# Patient Record
Sex: FEMALE | Race: WHITE | NOT HISPANIC OR LATINO | Employment: PART TIME | URBAN - METROPOLITAN AREA
[De-identification: names, ages, dates, MRNs, and addresses within clinical notes are randomized per-mention and may not be internally consistent; named-entity substitution may affect disease eponyms.]

---

## 2020-06-21 ENCOUNTER — CLINICAL SUPPORT (OUTPATIENT)
Dept: URGENT CARE | Facility: CLINIC | Age: 30
End: 2020-06-21
Payer: OTHER GOVERNMENT

## 2020-06-21 VITALS
HEART RATE: 87 BPM | DIASTOLIC BLOOD PRESSURE: 75 MMHG | OXYGEN SATURATION: 98 % | WEIGHT: 145 LBS | HEIGHT: 70 IN | TEMPERATURE: 97 F | BODY MASS INDEX: 20.76 KG/M2 | RESPIRATION RATE: 19 BRPM | SYSTOLIC BLOOD PRESSURE: 113 MMHG

## 2020-06-21 DIAGNOSIS — E10.9 CONTROLLED DIABETES MELLITUS TYPE 1 WITHOUT COMPLICATIONS: ICD-10-CM

## 2020-06-21 DIAGNOSIS — Z76.0 MEDICATION REFILL: Primary | ICD-10-CM

## 2020-06-21 DIAGNOSIS — R73.9 HYPERGLYCEMIA: ICD-10-CM

## 2020-06-21 LAB — GLUCOSE SERPL-MCNC: 417 MG/DL (ref 70–110)

## 2020-06-21 PROCEDURE — 99204 OFFICE O/P NEW MOD 45 MIN: CPT | Mod: S$GLB,,, | Performed by: NURSE PRACTITIONER

## 2020-06-21 PROCEDURE — 99204 PR OFFICE/OUTPT VISIT, NEW, LEVL IV, 45-59 MIN: ICD-10-PCS | Mod: S$GLB,,, | Performed by: NURSE PRACTITIONER

## 2020-06-21 PROCEDURE — 82962 GLUCOSE BLOOD TEST: CPT | Mod: ,,, | Performed by: NURSE PRACTITIONER

## 2020-06-21 PROCEDURE — 82962 POCT GLUCOSE, HAND-HELD DEVICE: ICD-10-PCS | Mod: ,,, | Performed by: NURSE PRACTITIONER

## 2020-06-21 RX ORDER — INSULIN ASPART 100 [IU]/ML
1 INJECTION, SOLUTION INTRAVENOUS; SUBCUTANEOUS
Qty: 6 VIAL | Refills: 0 | Status: SHIPPED | OUTPATIENT
Start: 2020-06-21 | End: 2020-09-25

## 2020-06-21 NOTE — PATIENT INSTRUCTIONS
Step-by-Step  Checking Your Blood Sugar    Date Last Reviewed: 5/1/2016  © 2350-8836 [employer], [employer]. [address] [place] [address], [place] [address]. All rights reserved. This information is not intended as a substitute for professional medical care. Always follow your healthcare professional's instructions.

## 2020-06-21 NOTE — PROGRESS NOTES
"Subjective:       Patient ID: Adore Garcia is a 29 y.o. female.    Vitals:  height is 5' 10" (1.778 m) and weight is 65.8 kg (145 lb). Her temperature is 97.1 °F (36.2 °C). Her blood pressure is 113/75 and her pulse is 87. Her respiration is 19 and oxygen saturation is 98%.     Chief Complaint: Medication Refill    Needs refill on novolog insulin, tried contacting doctor for refill no response     Medication Refill  Pertinent negatives include no fever.       Constitution: Negative for fever.   Endocrine: excessive thirst and excessive urination.   Genitourinary: Negative for dysuria, frequency, urgency and urine decreased.       Objective:      Physical Exam   HENT:   Head: Normocephalic.   Eyes: Conjunctivae are normal.   Neck: Normal range of motion.   Cardiovascular: Normal rate.   Pulmonary/Chest: Effort normal.   Abdominal: Normal appearance.   Neurological: She is alert.   Psychiatric: Mood normal.   Nursing note and vitals reviewed.        Assessment:       1. Medication refill    2. Controlled diabetes mellitus type 1 without complications    3. Hyperglycemia        Plan:         Medication refill  -     POCT Glucose, Hand-Held Device    Controlled diabetes mellitus type 1 without complications  -     POCT Glucose, Hand-Held Device    Hyperglycemia  -     POCT Glucose, Hand-Held Device    Other orders  -     insulin aspart U-100 (NOVOLOG) 100 unit/mL injection; Inject 1 Units into the skin as needed for High Blood Sugar.  Dispense: 6 vial; Refill: 0    Pt is a type 1 diabetic presenting as she is unable to get in touch with her endocrinologist for several weeks to refill insulin for her pump. Pt CBG 417mg/dL but is asymptomatic and ran out of insulin this a.m. She was provided 1 month supply to avoid any abrupt stop and advised to go to ER for any further issues.        "

## 2020-09-25 ENCOUNTER — OFFICE VISIT (OUTPATIENT)
Dept: ENDOCRINOLOGY | Facility: CLINIC | Age: 30
End: 2020-09-25
Payer: MEDICAID

## 2020-09-25 VITALS
HEART RATE: 98 BPM | TEMPERATURE: 97 F | DIASTOLIC BLOOD PRESSURE: 70 MMHG | SYSTOLIC BLOOD PRESSURE: 104 MMHG | BODY MASS INDEX: 20.94 KG/M2 | HEIGHT: 70 IN | WEIGHT: 146.25 LBS

## 2020-09-25 DIAGNOSIS — E10.65 TYPE 1 DIABETES MELLITUS WITH HYPERGLYCEMIA: Primary | ICD-10-CM

## 2020-09-25 PROCEDURE — 99999 PR PBB SHADOW E&M-EST. PATIENT-LVL IV: CPT | Mod: PBBFAC,,, | Performed by: PHYSICIAN ASSISTANT

## 2020-09-25 PROCEDURE — 99203 OFFICE O/P NEW LOW 30 MIN: CPT | Mod: S$PBB,,, | Performed by: PHYSICIAN ASSISTANT

## 2020-09-25 PROCEDURE — 99999 PR PBB SHADOW E&M-EST. PATIENT-LVL IV: ICD-10-PCS | Mod: PBBFAC,,, | Performed by: PHYSICIAN ASSISTANT

## 2020-09-25 PROCEDURE — 99203 PR OFFICE/OUTPT VISIT, NEW, LEVL III, 30-44 MIN: ICD-10-PCS | Mod: S$PBB,,, | Performed by: PHYSICIAN ASSISTANT

## 2020-09-25 PROCEDURE — 99214 OFFICE O/P EST MOD 30 MIN: CPT | Mod: PBBFAC,PO | Performed by: PHYSICIAN ASSISTANT

## 2020-09-25 RX ORDER — INSULIN ASPART 100 [IU]/ML
INJECTION, SOLUTION INTRAVENOUS; SUBCUTANEOUS
Qty: 90 ML | Refills: 3 | Status: SHIPPED | OUTPATIENT
Start: 2020-09-25 | End: 2020-09-29

## 2020-09-25 NOTE — PROGRESS NOTES
CC: This 29 y.o. female presents for management of Diabetes (1)    and chronic conditions pending review including HTN, HLP    HPI: was diagnosed with T1DM in 1996 after experiencing polyuria and polydipsia.   Has been hospitalized for DKA multiple times. New to endocrine. She did have seizures in the past after having hypoglycemia. She has been on the pump since she was 15.   Family hx of DM: half sister  Fhx of thyroid disease: parents  Denies missing doses of DM medication.   hypoglycemia at home  monitoring BG at home:  Fastin-200  DN: 200    Diet:   BF-2 sausage biscuits  LH-pb sandwich  DN-fried seafood.   No snacks. Avoids sugary beverages.     Exercise: She runs, walks and does yoga at home 5x weeky.     Name of Device or Devices used by the patient: MiniMed 530 G  When did you start the current therapy you are on: 4 years ago   Frequency of changing site/infusion set/tubing/cartridges: 6 days  Frequency of changing CGM: n/a  Using bolus wizard: no  Taking bolus with each meal:  no    Basal  MN: 1.40 u/hr  2 am: 1.65 u/hr  10 am: 1.90 u/hr  16:30: 1.75 u/hr    CHO: 6  ISF  MN: 50  7:00: 20  21:00: 25    Target: 100    Standards of Care:  Eye exam: 3/20  Podiatry exam: n/a  DE:  A few years ago     PMHx, PSHx: reviewed in epic.  Social Hx: no E/T use. Works at [employer] as a . Currently, going through a divorce. Has one son ~5 yrs old.    ROS:   Gen: Appetite good, no weight gain or loss, denies fatigue and weakness.  Skin: Skin is intact and heals well, no rashes, no hair changes  Eyes: Denies visual disturbances  Resp: no SOB or CHAN, no cough  Cardiac: No palpitations, chest pain, no edema   GI: No nausea or vomiting, diarrhea, constipation, or abdominal pain.  /GYN: No nocturia, burning or pain.   MS/Neuro: Denies numbness/ tingling in BLE; Gait steady, speech clear  Psych: Denies drug/ETOH abuse, no hx of depression.  Other systems: negative.    /70 (BP Location: Right arm, Patient  "Position: Sitting, BP Method: Small (Manual))   Pulse 98   Temp 97.3 °F (36.3 °C) (Temporal)   Ht 5' 10" (1.778 m)   Wt 66.3 kg (146 lb 4.4 oz)   BMI 20.99 kg/m²      PE:  GENERAL: young female, well developed, well nourished.  PSYCH: AAOx3, appropriate mood and affect, pleasant expression, conversant, appears relaxed, well groomed.   EYES: Conjunctiva, corneas clear  NECK: Supple, trachea midline,no thyromegaly or nodules  CHEST: Resp even and unlabored, CTA bilateral.  CARDIAC: RRR, S1, S2 heard, no murmurs  ABDOMEN: Soft, non-tender, non-distended.   VASCULAR: DP pulses +2/4 bilaterally, no edema.  NEURO: Gait steady, CN ll-Xll grossly intact   SKIN: Skin warm and dry no acanthosis nigracans.  Foot Exam: no sores or macerations noted.     Protective Sensation (w/ 10 gram monofilament):  Right: Intact  Left: Intact    Visual Inspection:  Normal -  Bilateral and Nails Intact - without Evidence of Foot Deformity- Bilateral    Pedal Pulses:   Right: Present  Left: Present    Posterior tibialis:   Right:Present  Left: Present     Vibratory Sensation  Right:Positive  Left:Positive     Personally reviewed labs below:    No visits with results within 1 Month(s) from this visit.   Latest known visit with results is:   Clinical Support on 06/21/2020   Component Date Value Ref Range Status    POC Glucose 06/21/2020 417* 70 - 110 MG/DL Final         ASSESSMENT and PLAN:    1. Type 1 diabetes mellitus with hyperglycemia  Ambulatory referral/consult to Diabetes Education    Hemoglobin A1C    Comprehensive metabolic panel    CBC auto differential    T4, free    TSH    Lipid Panel    Renal function panel    Hemoglobin A1C    insulin lispro 100 unit/mL injection     T2DM with hyperglycemia-A1c today. Pt not putting glucose in pump. Advised to start using in bolus wizard, change tubing every 3 days. Referral to DE. Will obtain previous records.  Discussed DM, progression of disease, long term complications, tx options. "   Discussed A1c and BG goals.   Reviewed  hypoglycemia, s/s and appropriate tx.   Instructed to monitor BG and bring meter/ log to every clinic visit.   - takes ASA, ACEi, statin       Follow-up: in 3 months with lab prior

## 2020-09-29 RX ORDER — INSULIN LISPRO 100 [IU]/ML
INJECTION, SOLUTION INTRAVENOUS; SUBCUTANEOUS
Qty: 90 ML | Refills: 3 | Status: SHIPPED | OUTPATIENT
Start: 2020-09-29 | End: 2021-02-24

## 2020-10-10 ENCOUNTER — LAB VISIT (OUTPATIENT)
Dept: LAB | Facility: HOSPITAL | Age: 30
End: 2020-10-10
Attending: PHYSICIAN ASSISTANT
Payer: MEDICAID

## 2020-10-10 DIAGNOSIS — E10.65 TYPE 1 DIABETES MELLITUS WITH HYPERGLYCEMIA: ICD-10-CM

## 2020-10-10 LAB
ALBUMIN SERPL BCP-MCNC: 3.9 G/DL (ref 3.5–5.2)
ALP SERPL-CCNC: 61 U/L (ref 55–135)
ALT SERPL W/O P-5'-P-CCNC: 10 U/L (ref 10–44)
ANION GAP SERPL CALC-SCNC: 10 MMOL/L (ref 8–16)
AST SERPL-CCNC: 12 U/L (ref 10–40)
BASOPHILS # BLD AUTO: 0.02 K/UL (ref 0–0.2)
BASOPHILS NFR BLD: 0.3 % (ref 0–1.9)
BILIRUB SERPL-MCNC: 0.7 MG/DL (ref 0.1–1)
BUN SERPL-MCNC: 11 MG/DL (ref 6–20)
CALCIUM SERPL-MCNC: 9.3 MG/DL (ref 8.7–10.5)
CHLORIDE SERPL-SCNC: 102 MMOL/L (ref 95–110)
CHOLEST SERPL-MCNC: 189 MG/DL (ref 120–199)
CHOLEST/HDLC SERPL: 2.1 {RATIO} (ref 2–5)
CO2 SERPL-SCNC: 26 MMOL/L (ref 23–29)
CREAT SERPL-MCNC: 0.7 MG/DL (ref 0.5–1.4)
DIFFERENTIAL METHOD: ABNORMAL
EOSINOPHIL # BLD AUTO: 0.1 K/UL (ref 0–0.5)
EOSINOPHIL NFR BLD: 2.4 % (ref 0–8)
ERYTHROCYTE [DISTWIDTH] IN BLOOD BY AUTOMATED COUNT: 12 % (ref 11.5–14.5)
EST. GFR  (AFRICAN AMERICAN): >60 ML/MIN/1.73 M^2
EST. GFR  (NON AFRICAN AMERICAN): >60 ML/MIN/1.73 M^2
ESTIMATED AVG GLUCOSE: 329 MG/DL (ref 68–131)
GLUCOSE SERPL-MCNC: 152 MG/DL (ref 70–110)
HBA1C MFR BLD HPLC: 13.1 % (ref 4–5.6)
HCT VFR BLD AUTO: 45.3 % (ref 37–48.5)
HDLC SERPL-MCNC: 90 MG/DL (ref 40–75)
HDLC SERPL: 47.6 % (ref 20–50)
HGB BLD-MCNC: 14.4 G/DL (ref 12–16)
IMM GRANULOCYTES # BLD AUTO: 0.01 K/UL (ref 0–0.04)
IMM GRANULOCYTES NFR BLD AUTO: 0.2 % (ref 0–0.5)
LDLC SERPL CALC-MCNC: 89.4 MG/DL (ref 63–159)
LYMPHOCYTES # BLD AUTO: 1.7 K/UL (ref 1–4.8)
LYMPHOCYTES NFR BLD: 29.1 % (ref 18–48)
MCH RBC QN AUTO: 28.2 PG (ref 27–31)
MCHC RBC AUTO-ENTMCNC: 31.8 G/DL (ref 32–36)
MCV RBC AUTO: 89 FL (ref 82–98)
MONOCYTES # BLD AUTO: 0.4 K/UL (ref 0.3–1)
MONOCYTES NFR BLD: 6.9 % (ref 4–15)
NEUTROPHILS # BLD AUTO: 3.5 K/UL (ref 1.8–7.7)
NEUTROPHILS NFR BLD: 61.1 % (ref 38–73)
NONHDLC SERPL-MCNC: 99 MG/DL
NRBC BLD-RTO: 0 /100 WBC
PLATELET # BLD AUTO: 273 K/UL (ref 150–350)
PMV BLD AUTO: 10.3 FL (ref 9.2–12.9)
POTASSIUM SERPL-SCNC: 4.4 MMOL/L (ref 3.5–5.1)
PROT SERPL-MCNC: 6.9 G/DL (ref 6–8.4)
RBC # BLD AUTO: 5.1 M/UL (ref 4–5.4)
SODIUM SERPL-SCNC: 138 MMOL/L (ref 136–145)
T4 FREE SERPL-MCNC: 1.04 NG/DL (ref 0.71–1.51)
TRIGL SERPL-MCNC: 48 MG/DL (ref 30–150)
TSH SERPL DL<=0.005 MIU/L-ACNC: 0.84 UIU/ML (ref 0.4–4)
WBC # BLD AUTO: 5.8 K/UL (ref 3.9–12.7)

## 2020-10-10 PROCEDURE — 80061 LIPID PANEL: CPT

## 2020-10-10 PROCEDURE — 84439 ASSAY OF FREE THYROXINE: CPT

## 2020-10-10 PROCEDURE — 83036 HEMOGLOBIN GLYCOSYLATED A1C: CPT

## 2020-10-10 PROCEDURE — 85025 COMPLETE CBC W/AUTO DIFF WBC: CPT

## 2020-10-10 PROCEDURE — 84443 ASSAY THYROID STIM HORMONE: CPT

## 2020-10-10 PROCEDURE — 80053 COMPREHEN METABOLIC PANEL: CPT

## 2020-10-10 PROCEDURE — 36415 COLL VENOUS BLD VENIPUNCTURE: CPT | Mod: PO

## 2020-10-14 ENCOUNTER — CLINICAL SUPPORT (OUTPATIENT)
Dept: DIABETES | Facility: CLINIC | Age: 30
End: 2020-10-14
Payer: MEDICAID

## 2020-10-14 DIAGNOSIS — E10.65 TYPE 1 DIABETES MELLITUS WITH HYPERGLYCEMIA: ICD-10-CM

## 2020-10-14 PROCEDURE — 99212 OFFICE O/P EST SF 10 MIN: CPT | Mod: PBBFAC,PO | Performed by: DIETITIAN, REGISTERED

## 2020-10-14 PROCEDURE — 99999 PR PBB SHADOW E&M-EST. PATIENT-LVL II: ICD-10-PCS | Mod: PBBFAC,,, | Performed by: DIETITIAN, REGISTERED

## 2020-10-14 PROCEDURE — G0108 DIAB MANAGE TRN  PER INDIV: HCPCS | Mod: PBBFAC,PO | Performed by: DIETITIAN, REGISTERED

## 2020-10-14 PROCEDURE — 99999 PR PBB SHADOW E&M-EST. PATIENT-LVL II: CPT | Mod: PBBFAC,,, | Performed by: DIETITIAN, REGISTERED

## 2020-10-28 ENCOUNTER — PATIENT MESSAGE (OUTPATIENT)
Dept: ENDOCRINOLOGY | Facility: CLINIC | Age: 30
End: 2020-10-28

## 2020-11-04 ENCOUNTER — OFFICE VISIT (OUTPATIENT)
Dept: OBSTETRICS AND GYNECOLOGY | Facility: CLINIC | Age: 30
End: 2020-11-04
Payer: MEDICAID

## 2020-11-04 VITALS
DIASTOLIC BLOOD PRESSURE: 70 MMHG | WEIGHT: 150.13 LBS | BODY MASS INDEX: 21.49 KG/M2 | SYSTOLIC BLOOD PRESSURE: 110 MMHG | HEIGHT: 70 IN

## 2020-11-04 DIAGNOSIS — Z01.411 ENCOUNTER FOR GYNECOLOGICAL EXAMINATION WITH ABNORMAL FINDING: Primary | ICD-10-CM

## 2020-11-04 DIAGNOSIS — N94.6 DYSMENORRHEA: ICD-10-CM

## 2020-11-04 DIAGNOSIS — Z12.4 SCREENING FOR MALIGNANT NEOPLASM OF CERVIX: ICD-10-CM

## 2020-11-04 PROCEDURE — 99999 PR PBB SHADOW E&M-EST. PATIENT-LVL III: CPT | Mod: PBBFAC,,, | Performed by: OBSTETRICS & GYNECOLOGY

## 2020-11-04 PROCEDURE — 99213 OFFICE O/P EST LOW 20 MIN: CPT | Mod: PBBFAC,PN | Performed by: OBSTETRICS & GYNECOLOGY

## 2020-11-04 PROCEDURE — 87624 HPV HI-RISK TYP POOLED RSLT: CPT

## 2020-11-04 PROCEDURE — 88175 CYTOPATH C/V AUTO FLUID REDO: CPT

## 2020-11-04 PROCEDURE — 99385 PR PREVENTIVE VISIT,NEW,18-39: ICD-10-PCS | Mod: 25,S$PBB,, | Performed by: OBSTETRICS & GYNECOLOGY

## 2020-11-04 PROCEDURE — 99999 PR PBB SHADOW E&M-EST. PATIENT-LVL III: ICD-10-PCS | Mod: PBBFAC,,, | Performed by: OBSTETRICS & GYNECOLOGY

## 2020-11-04 PROCEDURE — 99385 PREV VISIT NEW AGE 18-39: CPT | Mod: 25,S$PBB,, | Performed by: OBSTETRICS & GYNECOLOGY

## 2020-11-04 RX ORDER — NORGESTIMATE AND ETHINYL ESTRADIOL 0.25-0.035
1 KIT ORAL DAILY
Qty: 30 TABLET | Refills: 11 | Status: SHIPPED | OUTPATIENT
Start: 2020-11-04 | End: 2020-11-04 | Stop reason: ALTCHOICE

## 2020-11-04 RX ORDER — DROSPIRENONE 4 MG/1
4 TABLET, FILM COATED ORAL DAILY
Qty: 28 TABLET | Refills: 11 | Status: SHIPPED | OUTPATIENT
Start: 2020-11-04 | End: 2020-12-02

## 2020-11-12 LAB
HPV HR 12 DNA SPEC QL NAA+PROBE: NEGATIVE
HPV16 AG SPEC QL: NEGATIVE
HPV18 DNA SPEC QL NAA+PROBE: NEGATIVE

## 2020-11-24 VITALS — HEIGHT: 70 IN | WEIGHT: 150.13 LBS | BODY MASS INDEX: 21.49 KG/M2

## 2020-11-24 NOTE — PROGRESS NOTES
Diabetes Education  Author: Melanie Beckett RD, CDE  Date: 11/24/2020    Diabetes Care Management Summary  Diabetes Education Record Assessment/Progress: Initial  Current Diabetes Risk Level: High     Last A1c:   Lab Results   Component Value Date    HGBA1C 13.1 (H) 10/10/2020     Last Visit with Diabetes Educator: : 10/14/2020  Last OPCM Referral: : Not Found   Enrolled in OPCM: No      Diabetes Type  Diabetes Type : Type I    Diabetes History  Diabetes Diagnosis: >10 years  Current Treatment: Insulin pump  Reviewed Problem List with Patient: Yes    Health Maintenance was reviewed today with patient. Discussed with patient importance of routine eye exams, foot exams/foot care, blood work (i.e.: A1c, microalbumin, and lipid), dental visits, yearly flu vaccine, and pneumonia vaccine as indicated by PCP. Patient verbalized understanding.     Health Maintenance Topics with due status: Not Due       Topic Last Completion Date    Foot Exam 09/25/2020    Lipid Panel 10/10/2020    Hemoglobin A1c 10/10/2020     Health Maintenance Due   Topic Date Due    Hepatitis C Screening  1989    Eye Exam  12/24/1999    Urine Microalbumin  12/24/1999    HIV Screening  12/24/2004    TETANUS VACCINE  12/24/2007    Pneumococcal Vaccine (Medium Risk) (1 of 1 - PPSV23) 12/24/2008    Influenza Vaccine (1) 08/01/2020    Cervical Cancer Screening  11/05/2020       Nutrition  Meal Planning: skipping meals, water, snacks between meal  What type of sweetener do you use?: none  What type of beverages do you drink?: water  Meal Plan 24 Hour Recall - Breakfast: (Often skips)  Meal Plan 24 Hour Recall - Lunch: (Often Skips)  Meal Plan 24 Hour Recall - Dinner: (Largest meat)    Monitoring   Monitoring: Cont Next  US  Blood Glucose Logs: No  Do you use a personal continuous glucose monitor?: No(Provided samply dexcom for todays visit)  In the last month, how often have you had a low blood sugar reaction?: never  Can you tell when  your blood sugar is too high?: sometimes    Exercise   Exercise Type: none    Current Diabetes Treatment   Current Treatment: Insulin pump    Social History  Preferred Learning Method: Face to Face, Demonstration, Hands On, Reading Materials, Web Based  Primary Support: Self  Educational Level: Some College  Occupation: (Works at [employer])  Smoking Status: Never a Smoker  Alcohol Use: Never    Barriers to Change  Barriers to Change: None  Learning Challenges : None    Readiness to Learn   Readiness to Learn : Eager    Cultural Influences  Cultural Influences: None    Diabetes Education Assessment/Progress  Diabetes Disease Process (diabetes disease process and treatment options): Discussion  Nutrition (Incorporating nutritional management into one's lifestyle): Discussion, Instructed, Comprehends Key Points, Demonstrates Understanding/Competency (verbalizes/demonstrates), Written Materials Provided  Physical Activity (incorporating physical activity into one's lifestyle): Discussion  Medications (states correct name, dose, onset, peak, duration, side effects & timing of meds): Discussion, Instructed, Comprehends Key Points, Demonstrates Understanding/Competency(verbalizes/demonstrates), Written Materials Provided  Monitoring (monitoring blood glucose/other parameters & using results): Discussion, Instructed, Return Demonstration, Comprehends Key Points, Demonstrates Understanding/Competency (verbalizes/demonstrates), Written Materials Provided(Provided sample dexcom)  Acute Complications (preventing, detecting, and treating acute complications): Discussion  Chronic Complications (preventing, detecting, and treating chronic complications): Discussion  Clinical (diabetes, other pertinent medical history, and relevant comorbidities reviewed during visit): Discussion  Cognitive (knowledge of self-management skills, functional health literacy): Discussion  Psychosocial (emotional response to diabetes): Discussion  Diabetes  Distress and Support Systems: Discussion  Behavioral (readiness for change, lifestyle practices, self-care behaviors): Discussion    Goals  Patient has selected/evaluated goals during today's session: Yes, selected  Healthy Eating: Set(input carb into pump)  Start Date: 10/14/20  Target Date: 02/14/21  Physical Activity: In Progress  Monitoring: Set(put blood sugars into pump)  Start Date: 10/14/20  Target Date: 02/14/21  Medications: In Progress  Problem Solving: In Progress  Healthy Coping: In Progress  Reducing Risks: In Progress    Diabetes Self-Management Support Plan  Review Status: Patient has selected and agrees to support plan.    Diabetes Care Plan/Intervention  Education Plan/Intervention: Individual Follow-Up DSMT(Follow up for dexcom start once approved by insurance)    Diabetes Meal Plan  Calories: 1800  Carbohydrate Per Meal: 45-60g  Carbohydrate Per Snack : 7-15g    Today's Self-Management Care Plan was developed with the patient's input and is based on barriers identified during today's assessment.    The long and short-term goals in the care plan were written with the patient/caregiver's input. The patient has agreed to work toward these goals to improve her overall diabetes control.      The patient received a copy of today's self-management plan and verbalized understanding of the care plan, goals, and all of today's instructions.      The patient was encouraged to communicate with her physician and care team regarding her condition(s) and treatment.  I provided the patient with my contact information today and encouraged her to contact me via phone or patient portal as needed.     Education Units of Time   Time Spent: 60 min

## 2020-12-10 LAB
FINAL PATHOLOGIC DIAGNOSIS: NORMAL
Lab: NORMAL

## 2021-02-10 ENCOUNTER — PATIENT MESSAGE (OUTPATIENT)
Dept: ENDOCRINOLOGY | Facility: CLINIC | Age: 31
End: 2021-02-10

## 2021-02-21 ENCOUNTER — HOSPITAL ENCOUNTER (INPATIENT)
Facility: HOSPITAL | Age: 31
LOS: 2 days | Discharge: HOME | DRG: 639 | End: 2021-02-23
Attending: EMERGENCY MEDICINE | Admitting: INTERNAL MEDICINE
Payer: MEDICAID

## 2021-02-21 DIAGNOSIS — E10.65 TYPE 1 DIABETES MELLITUS WITH HYPERGLYCEMIA: ICD-10-CM

## 2021-02-21 DIAGNOSIS — R73.9 HYPERGLYCEMIA: ICD-10-CM

## 2021-02-21 DIAGNOSIS — E10.10 DIABETIC KETOACIDOSIS WITHOUT COMA ASSOCIATED WITH TYPE 1 DIABETES MELLITUS: Primary | ICD-10-CM

## 2021-02-21 PROBLEM — N83.201 CYST OF RIGHT OVARY: Status: ACTIVE | Noted: 2021-02-21

## 2021-02-21 LAB
ALBUMIN SERPL BCP-MCNC: 4.3 G/DL (ref 3.5–5.2)
ALP SERPL-CCNC: 83 U/L (ref 55–135)
ALT SERPL W/O P-5'-P-CCNC: 12 U/L (ref 10–44)
ANION GAP SERPL CALC-SCNC: 14 MMOL/L (ref 8–16)
ANION GAP SERPL CALC-SCNC: 15 MMOL/L (ref 8–16)
ANION GAP SERPL CALC-SCNC: 24 MMOL/L (ref 8–16)
AST SERPL-CCNC: 18 U/L (ref 10–40)
B-HCG UR QL: NEGATIVE
B-OH-BUTYR BLD STRIP-SCNC: 5.6 MMOL/L (ref 0–0.5)
BACTERIA #/AREA URNS HPF: ABNORMAL /HPF
BASOPHILS NFR BLD: 0 % (ref 0–1.9)
BILIRUB SERPL-MCNC: 0.4 MG/DL (ref 0.1–1)
BILIRUB UR QL STRIP: NEGATIVE
BUN SERPL-MCNC: 12 MG/DL (ref 6–20)
BUN SERPL-MCNC: 15 MG/DL (ref 6–20)
BUN SERPL-MCNC: 16 MG/DL (ref 6–20)
CALCIUM SERPL-MCNC: 7.9 MG/DL (ref 8.7–10.5)
CALCIUM SERPL-MCNC: 8.1 MG/DL (ref 8.7–10.5)
CALCIUM SERPL-MCNC: 9.3 MG/DL (ref 8.7–10.5)
CHLORIDE SERPL-SCNC: 103 MMOL/L (ref 95–110)
CHLORIDE SERPL-SCNC: 109 MMOL/L (ref 95–110)
CHLORIDE SERPL-SCNC: 114 MMOL/L (ref 95–110)
CLARITY UR: CLEAR
CO2 SERPL-SCNC: 6 MMOL/L (ref 23–29)
CO2 SERPL-SCNC: 9 MMOL/L (ref 23–29)
CO2 SERPL-SCNC: 9 MMOL/L (ref 23–29)
COLOR UR: YELLOW
CREAT SERPL-MCNC: 0.9 MG/DL (ref 0.5–1.4)
CREAT SERPL-MCNC: 1 MG/DL (ref 0.5–1.4)
CREAT SERPL-MCNC: 1.1 MG/DL (ref 0.5–1.4)
CTP QC/QA: YES
DIFFERENTIAL METHOD: ABNORMAL
EOSINOPHIL NFR BLD: 0 % (ref 0–8)
ERYTHROCYTE [DISTWIDTH] IN BLOOD BY AUTOMATED COUNT: 11.9 % (ref 11.5–14.5)
EST. GFR  (AFRICAN AMERICAN): >60 ML/MIN/1.73 M^2
EST. GFR  (NON AFRICAN AMERICAN): >60 ML/MIN/1.73 M^2
GLUCOSE SERPL-MCNC: 245 MG/DL (ref 70–110)
GLUCOSE SERPL-MCNC: 329 MG/DL (ref 70–110)
GLUCOSE SERPL-MCNC: 425 MG/DL (ref 70–110)
GLUCOSE SERPL-MCNC: 457 MG/DL (ref 70–110)
GLUCOSE UR QL STRIP: ABNORMAL
HCO3 UR-SCNC: 7.5 MMOL/L (ref 24–28)
HCT VFR BLD AUTO: 47.1 % (ref 37–48.5)
HCT VFR BLD CALC: 48 %PCV (ref 36–54)
HGB BLD-MCNC: 15 G/DL (ref 12–16)
HGB UR QL STRIP: ABNORMAL
IMM GRANULOCYTES # BLD AUTO: ABNORMAL K/UL
IMM GRANULOCYTES NFR BLD AUTO: ABNORMAL %
KETONES UR QL STRIP: ABNORMAL
LACTATE SERPL-SCNC: 1.3 MMOL/L (ref 0.5–2.2)
LACTATE SERPL-SCNC: 1.7 MMOL/L (ref 0.5–2.2)
LEUKOCYTE ESTERASE UR QL STRIP: NEGATIVE
LIPASE SERPL-CCNC: 6 U/L (ref 4–60)
LYMPHOCYTES NFR BLD: 6 % (ref 18–48)
MCH RBC QN AUTO: 29.5 PG (ref 27–31)
MCHC RBC AUTO-ENTMCNC: 31.8 G/DL (ref 32–36)
MCV RBC AUTO: 93 FL (ref 82–98)
MICROSCOPIC COMMENT: ABNORMAL
MONOCYTES NFR BLD: 1 % (ref 4–15)
NEUTROPHILS NFR BLD: 93 % (ref 38–73)
NITRITE UR QL STRIP: NEGATIVE
NRBC BLD-RTO: 0 /100 WBC
PCO2 BLDA: 22.8 MMHG (ref 35–45)
PH SMN: 7.13 [PH] (ref 7.35–7.45)
PH UR STRIP: 6 [PH] (ref 5–8)
PLATELET # BLD AUTO: 307 K/UL (ref 150–350)
PLATELET BLD QL SMEAR: ABNORMAL
PMV BLD AUTO: 10 FL (ref 9.2–12.9)
PO2 BLDA: 82 MMHG (ref 50–70)
POC BE: -22 MMOL/L
POC IONIZED CALCIUM: 1.25 MMOL/L (ref 1.06–1.42)
POC SATURATED O2: 92 % (ref 95–100)
POC TCO2: 8 MMOL/L (ref 23–27)
POCT GLUCOSE: 218 MG/DL (ref 70–110)
POCT GLUCOSE: 222 MG/DL (ref 70–110)
POCT GLUCOSE: 223 MG/DL (ref 70–110)
POCT GLUCOSE: 293 MG/DL (ref 70–110)
POCT GLUCOSE: 296 MG/DL (ref 70–110)
POCT GLUCOSE: 313 MG/DL (ref 70–110)
POCT GLUCOSE: 328 MG/DL (ref 70–110)
POCT GLUCOSE: 366 MG/DL (ref 70–110)
POCT GLUCOSE: 383 MG/DL (ref 70–110)
POTASSIUM BLD-SCNC: 5.2 MMOL/L (ref 3.5–5.1)
POTASSIUM SERPL-SCNC: 4.7 MMOL/L (ref 3.5–5.1)
POTASSIUM SERPL-SCNC: 5 MMOL/L (ref 3.5–5.1)
POTASSIUM SERPL-SCNC: 5.2 MMOL/L (ref 3.5–5.1)
PROT SERPL-MCNC: 7.9 G/DL (ref 6–8.4)
PROT UR QL STRIP: ABNORMAL
RBC # BLD AUTO: 5.08 M/UL (ref 4–5.4)
RBC #/AREA URNS HPF: 30 /HPF (ref 0–4)
SAMPLE: ABNORMAL
SARS-COV-2 RDRP RESP QL NAA+PROBE: NEGATIVE
SODIUM BLD-SCNC: 136 MMOL/L (ref 136–145)
SODIUM SERPL-SCNC: 133 MMOL/L (ref 136–145)
SODIUM SERPL-SCNC: 133 MMOL/L (ref 136–145)
SODIUM SERPL-SCNC: 137 MMOL/L (ref 136–145)
SP GR UR STRIP: 1.02 (ref 1–1.03)
SQUAMOUS #/AREA URNS HPF: 4 /HPF
URN SPEC COLLECT METH UR: ABNORMAL
UROBILINOGEN UR STRIP-ACNC: NEGATIVE EU/DL
WBC # BLD AUTO: 30.3 K/UL (ref 3.9–12.7)
WBC #/AREA URNS HPF: 5 /HPF (ref 0–5)
YEAST URNS QL MICRO: ABNORMAL

## 2021-02-21 PROCEDURE — 96375 TX/PRO/DX INJ NEW DRUG ADDON: CPT

## 2021-02-21 PROCEDURE — 80048 BASIC METABOLIC PNL TOTAL CA: CPT

## 2021-02-21 PROCEDURE — 36415 COLL VENOUS BLD VENIPUNCTURE: CPT

## 2021-02-21 PROCEDURE — 82803 BLOOD GASES ANY COMBINATION: CPT

## 2021-02-21 PROCEDURE — 25000003 PHARM REV CODE 250: Performed by: EMERGENCY MEDICINE

## 2021-02-21 PROCEDURE — 82962 GLUCOSE BLOOD TEST: CPT

## 2021-02-21 PROCEDURE — 93005 ELECTROCARDIOGRAM TRACING: CPT

## 2021-02-21 PROCEDURE — 25500020 PHARM REV CODE 255: Performed by: EMERGENCY MEDICINE

## 2021-02-21 PROCEDURE — C9399 UNCLASSIFIED DRUGS OR BIOLOG: HCPCS | Performed by: INTERNAL MEDICINE

## 2021-02-21 PROCEDURE — U0002 COVID-19 LAB TEST NON-CDC: HCPCS

## 2021-02-21 PROCEDURE — 81025 URINE PREGNANCY TEST: CPT | Performed by: EMERGENCY MEDICINE

## 2021-02-21 PROCEDURE — 25000003 PHARM REV CODE 250: Performed by: INTERNAL MEDICINE

## 2021-02-21 PROCEDURE — 80053 COMPREHEN METABOLIC PANEL: CPT

## 2021-02-21 PROCEDURE — 99291 CRITICAL CARE FIRST HOUR: CPT | Mod: 25

## 2021-02-21 PROCEDURE — 96361 HYDRATE IV INFUSION ADD-ON: CPT

## 2021-02-21 PROCEDURE — 96366 THER/PROPH/DIAG IV INF ADDON: CPT

## 2021-02-21 PROCEDURE — 83605 ASSAY OF LACTIC ACID: CPT

## 2021-02-21 PROCEDURE — 93010 EKG 12-LEAD: ICD-10-PCS | Mod: ,,, | Performed by: SPECIALIST

## 2021-02-21 PROCEDURE — 63600175 PHARM REV CODE 636 W HCPCS: Performed by: EMERGENCY MEDICINE

## 2021-02-21 PROCEDURE — 99900035 HC TECH TIME PER 15 MIN (STAT)

## 2021-02-21 PROCEDURE — 93010 ELECTROCARDIOGRAM REPORT: CPT | Mod: ,,, | Performed by: SPECIALIST

## 2021-02-21 PROCEDURE — 85027 COMPLETE CBC AUTOMATED: CPT

## 2021-02-21 PROCEDURE — 20000000 HC ICU ROOM

## 2021-02-21 PROCEDURE — 96365 THER/PROPH/DIAG IV INF INIT: CPT

## 2021-02-21 PROCEDURE — 83690 ASSAY OF LIPASE: CPT

## 2021-02-21 PROCEDURE — 87040 BLOOD CULTURE FOR BACTERIA: CPT

## 2021-02-21 PROCEDURE — 36416 COLLJ CAPILLARY BLOOD SPEC: CPT

## 2021-02-21 PROCEDURE — 63600175 PHARM REV CODE 636 W HCPCS: Performed by: INTERNAL MEDICINE

## 2021-02-21 PROCEDURE — 85007 BL SMEAR W/DIFF WBC COUNT: CPT

## 2021-02-21 PROCEDURE — 82010 KETONE BODYS QUAN: CPT

## 2021-02-21 PROCEDURE — 81000 URINALYSIS NONAUTO W/SCOPE: CPT

## 2021-02-21 RX ORDER — DEXTROSE MONOHYDRATE 100 MG/ML
INJECTION, SOLUTION INTRAVENOUS
Status: DISCONTINUED | OUTPATIENT
Start: 2021-02-21 | End: 2021-02-23 | Stop reason: HOSPADM

## 2021-02-21 RX ORDER — SODIUM CHLORIDE 0.9 % (FLUSH) 0.9 %
10 SYRINGE (ML) INJECTION
Status: DISCONTINUED | OUTPATIENT
Start: 2021-02-21 | End: 2021-02-21

## 2021-02-21 RX ORDER — DROSPIRENONE 4 MG/1
1 TABLET, FILM COATED ORAL DAILY
COMMUNITY
Start: 2020-12-11 | End: 2021-09-08

## 2021-02-21 RX ORDER — MUPIROCIN 20 MG/G
OINTMENT TOPICAL 2 TIMES DAILY
Status: DISCONTINUED | OUTPATIENT
Start: 2021-02-21 | End: 2021-02-23 | Stop reason: HOSPADM

## 2021-02-21 RX ORDER — TALC
6 POWDER (GRAM) TOPICAL NIGHTLY PRN
Status: DISCONTINUED | OUTPATIENT
Start: 2021-02-21 | End: 2021-02-23 | Stop reason: HOSPADM

## 2021-02-21 RX ORDER — GLUCAGON 1 MG
1 KIT INJECTION
Status: DISCONTINUED | OUTPATIENT
Start: 2021-02-21 | End: 2021-02-23 | Stop reason: HOSPADM

## 2021-02-21 RX ORDER — SODIUM CHLORIDE 0.9 % (FLUSH) 0.9 %
10 SYRINGE (ML) INJECTION
Status: DISCONTINUED | OUTPATIENT
Start: 2021-02-21 | End: 2021-02-23 | Stop reason: HOSPADM

## 2021-02-21 RX ORDER — SODIUM CHLORIDE 9 MG/ML
INJECTION, SOLUTION INTRAVENOUS CONTINUOUS
Status: ACTIVE | OUTPATIENT
Start: 2021-02-21 | End: 2021-02-22

## 2021-02-21 RX ORDER — DEXTROSE MONOHYDRATE 100 MG/ML
INJECTION, SOLUTION INTRAVENOUS
Status: DISCONTINUED | OUTPATIENT
Start: 2021-02-21 | End: 2021-02-21

## 2021-02-21 RX ORDER — DEXTROSE MONOHYDRATE AND SODIUM CHLORIDE 5; .9 G/100ML; G/100ML
INJECTION, SOLUTION INTRAVENOUS DAILY PRN
Status: DISCONTINUED | OUTPATIENT
Start: 2021-02-21 | End: 2021-02-21

## 2021-02-21 RX ORDER — CIPROFLOXACIN 2 MG/ML
400 INJECTION, SOLUTION INTRAVENOUS
Status: COMPLETED | OUTPATIENT
Start: 2021-02-21 | End: 2021-02-21

## 2021-02-21 RX ORDER — ONDANSETRON 4 MG/1
4 TABLET, ORALLY DISINTEGRATING ORAL
Status: DISCONTINUED | OUTPATIENT
Start: 2021-02-21 | End: 2021-02-21

## 2021-02-21 RX ORDER — IBUPROFEN 200 MG
24 TABLET ORAL
Status: DISCONTINUED | OUTPATIENT
Start: 2021-02-21 | End: 2021-02-23 | Stop reason: HOSPADM

## 2021-02-21 RX ORDER — INSULIN ASPART 100 [IU]/ML
1-10 INJECTION, SOLUTION INTRAVENOUS; SUBCUTANEOUS
Status: DISCONTINUED | OUTPATIENT
Start: 2021-02-21 | End: 2021-02-23 | Stop reason: HOSPADM

## 2021-02-21 RX ORDER — ONDANSETRON 2 MG/ML
4 INJECTION INTRAMUSCULAR; INTRAVENOUS
Status: COMPLETED | OUTPATIENT
Start: 2021-02-21 | End: 2021-02-21

## 2021-02-21 RX ORDER — IBUPROFEN 200 MG
16 TABLET ORAL
Status: DISCONTINUED | OUTPATIENT
Start: 2021-02-21 | End: 2021-02-23 | Stop reason: HOSPADM

## 2021-02-21 RX ADMIN — SODIUM CHLORIDE 6 UNITS/HR: 9 INJECTION, SOLUTION INTRAVENOUS at 10:02

## 2021-02-21 RX ADMIN — INSULIN DETEMIR 30 UNITS: 100 INJECTION, SOLUTION SUBCUTANEOUS at 05:02

## 2021-02-21 RX ADMIN — IOHEXOL 75 ML: 350 INJECTION, SOLUTION INTRAVENOUS at 10:02

## 2021-02-21 RX ADMIN — SODIUM CHLORIDE 500 ML: 0.9 INJECTION, SOLUTION INTRAVENOUS at 12:02

## 2021-02-21 RX ADMIN — CIPROFLOXACIN 400 MG: 2 INJECTION, SOLUTION INTRAVENOUS at 10:02

## 2021-02-21 RX ADMIN — ONDANSETRON 4 MG: 2 INJECTION INTRAMUSCULAR; INTRAVENOUS at 08:02

## 2021-02-21 RX ADMIN — SODIUM CHLORIDE: 0.9 INJECTION, SOLUTION INTRAVENOUS at 02:02

## 2021-02-21 RX ADMIN — INSULIN ASPART 3 UNITS: 100 INJECTION, SOLUTION INTRAVENOUS; SUBCUTANEOUS at 08:02

## 2021-02-21 RX ADMIN — DEXTROSE AND SODIUM CHLORIDE: 5; .9 INJECTION, SOLUTION INTRAVENOUS at 03:02

## 2021-02-21 RX ADMIN — SODIUM CHLORIDE 1000 ML: 0.9 INJECTION, SOLUTION INTRAVENOUS at 08:02

## 2021-02-22 LAB
ANION GAP SERPL CALC-SCNC: 10 MMOL/L (ref 8–16)
ANION GAP SERPL CALC-SCNC: 12 MMOL/L (ref 8–16)
ANION GAP SERPL CALC-SCNC: 14 MMOL/L (ref 8–16)
ANION GAP SERPL CALC-SCNC: 15 MMOL/L (ref 8–16)
BUN SERPL-MCNC: 10 MG/DL (ref 6–20)
BUN SERPL-MCNC: 10 MG/DL (ref 6–20)
BUN SERPL-MCNC: 11 MG/DL (ref 6–20)
BUN SERPL-MCNC: 9 MG/DL (ref 6–20)
CALCIUM SERPL-MCNC: 7.7 MG/DL (ref 8.7–10.5)
CALCIUM SERPL-MCNC: 7.9 MG/DL (ref 8.7–10.5)
CALCIUM SERPL-MCNC: 7.9 MG/DL (ref 8.7–10.5)
CALCIUM SERPL-MCNC: 8 MG/DL (ref 8.7–10.5)
CHLORIDE SERPL-SCNC: 109 MMOL/L (ref 95–110)
CHLORIDE SERPL-SCNC: 112 MMOL/L (ref 95–110)
CHLORIDE SERPL-SCNC: 112 MMOL/L (ref 95–110)
CHLORIDE SERPL-SCNC: 113 MMOL/L (ref 95–110)
CO2 SERPL-SCNC: 10 MMOL/L (ref 23–29)
CO2 SERPL-SCNC: 13 MMOL/L (ref 23–29)
CO2 SERPL-SCNC: 7 MMOL/L (ref 23–29)
CO2 SERPL-SCNC: 9 MMOL/L (ref 23–29)
CREAT SERPL-MCNC: 0.8 MG/DL (ref 0.5–1.4)
EST. GFR  (AFRICAN AMERICAN): >60 ML/MIN/1.73 M^2
EST. GFR  (NON AFRICAN AMERICAN): >60 ML/MIN/1.73 M^2
ESTIMATED AVG GLUCOSE: 309 MG/DL (ref 68–131)
GLUCOSE SERPL-MCNC: 224 MG/DL (ref 70–110)
GLUCOSE SERPL-MCNC: 230 MG/DL (ref 70–110)
GLUCOSE SERPL-MCNC: 250 MG/DL (ref 70–110)
GLUCOSE SERPL-MCNC: 287 MG/DL (ref 70–110)
HBA1C MFR BLD: 12.4 % (ref 4–5.6)
POCT GLUCOSE: 202 MG/DL (ref 70–110)
POCT GLUCOSE: 204 MG/DL (ref 70–110)
POCT GLUCOSE: 235 MG/DL (ref 70–110)
POCT GLUCOSE: 311 MG/DL (ref 70–110)
POTASSIUM SERPL-SCNC: 3.8 MMOL/L (ref 3.5–5.1)
POTASSIUM SERPL-SCNC: 3.9 MMOL/L (ref 3.5–5.1)
POTASSIUM SERPL-SCNC: 4 MMOL/L (ref 3.5–5.1)
POTASSIUM SERPL-SCNC: 4.2 MMOL/L (ref 3.5–5.1)
SODIUM SERPL-SCNC: 132 MMOL/L (ref 136–145)
SODIUM SERPL-SCNC: 134 MMOL/L (ref 136–145)
SODIUM SERPL-SCNC: 134 MMOL/L (ref 136–145)
SODIUM SERPL-SCNC: 136 MMOL/L (ref 136–145)

## 2021-02-22 PROCEDURE — 63600175 PHARM REV CODE 636 W HCPCS: Performed by: INTERNAL MEDICINE

## 2021-02-22 PROCEDURE — 25000003 PHARM REV CODE 250: Performed by: INTERNAL MEDICINE

## 2021-02-22 PROCEDURE — 80048 BASIC METABOLIC PNL TOTAL CA: CPT | Mod: 91

## 2021-02-22 PROCEDURE — C9399 UNCLASSIFIED DRUGS OR BIOLOG: HCPCS | Performed by: INTERNAL MEDICINE

## 2021-02-22 PROCEDURE — 36415 COLL VENOUS BLD VENIPUNCTURE: CPT

## 2021-02-22 PROCEDURE — 12000002 HC ACUTE/MED SURGE SEMI-PRIVATE ROOM

## 2021-02-22 PROCEDURE — 83036 HEMOGLOBIN GLYCOSYLATED A1C: CPT

## 2021-02-22 RX ORDER — INSULIN ASPART 100 [IU]/ML
5 INJECTION, SOLUTION INTRAVENOUS; SUBCUTANEOUS
Status: DISCONTINUED | OUTPATIENT
Start: 2021-02-22 | End: 2021-02-23 | Stop reason: HOSPADM

## 2021-02-22 RX ADMIN — INSULIN ASPART 5 UNITS: 100 INJECTION, SOLUTION INTRAVENOUS; SUBCUTANEOUS at 06:02

## 2021-02-22 RX ADMIN — MUPIROCIN: 20 OINTMENT TOPICAL at 10:02

## 2021-02-22 RX ADMIN — INSULIN ASPART 4 UNITS: 100 INJECTION, SOLUTION INTRAVENOUS; SUBCUTANEOUS at 06:02

## 2021-02-22 RX ADMIN — MUPIROCIN: 20 OINTMENT TOPICAL at 08:02

## 2021-02-22 RX ADMIN — INSULIN ASPART 5 UNITS: 100 INJECTION, SOLUTION INTRAVENOUS; SUBCUTANEOUS at 02:02

## 2021-02-22 RX ADMIN — INSULIN ASPART 8 UNITS: 100 INJECTION, SOLUTION INTRAVENOUS; SUBCUTANEOUS at 01:02

## 2021-02-22 RX ADMIN — INSULIN ASPART 2 UNITS: 100 INJECTION, SOLUTION INTRAVENOUS; SUBCUTANEOUS at 08:02

## 2021-02-22 RX ADMIN — INSULIN DETEMIR 30 UNITS: 100 INJECTION, SOLUTION SUBCUTANEOUS at 09:02

## 2021-02-23 VITALS
BODY MASS INDEX: 21.72 KG/M2 | RESPIRATION RATE: 18 BRPM | HEIGHT: 70 IN | DIASTOLIC BLOOD PRESSURE: 73 MMHG | OXYGEN SATURATION: 99 % | HEART RATE: 98 BPM | WEIGHT: 151.69 LBS | SYSTOLIC BLOOD PRESSURE: 114 MMHG | TEMPERATURE: 97 F

## 2021-02-23 LAB
ANION GAP SERPL CALC-SCNC: 10 MMOL/L (ref 8–16)
ANION GAP SERPL CALC-SCNC: 13 MMOL/L (ref 8–16)
BASOPHILS # BLD AUTO: 0.02 K/UL (ref 0–0.2)
BASOPHILS NFR BLD: 0.2 % (ref 0–1.9)
BUN SERPL-MCNC: 11 MG/DL (ref 6–20)
BUN SERPL-MCNC: 12 MG/DL (ref 6–20)
CALCIUM SERPL-MCNC: 7.9 MG/DL (ref 8.7–10.5)
CALCIUM SERPL-MCNC: 7.9 MG/DL (ref 8.7–10.5)
CHLORIDE SERPL-SCNC: 106 MMOL/L (ref 95–110)
CHLORIDE SERPL-SCNC: 111 MMOL/L (ref 95–110)
CO2 SERPL-SCNC: 16 MMOL/L (ref 23–29)
CO2 SERPL-SCNC: 16 MMOL/L (ref 23–29)
CREAT SERPL-MCNC: 0.7 MG/DL (ref 0.5–1.4)
CREAT SERPL-MCNC: 0.7 MG/DL (ref 0.5–1.4)
DIFFERENTIAL METHOD: NORMAL
EOSINOPHIL # BLD AUTO: 0.1 K/UL (ref 0–0.5)
EOSINOPHIL NFR BLD: 1.4 % (ref 0–8)
ERYTHROCYTE [DISTWIDTH] IN BLOOD BY AUTOMATED COUNT: 12.3 % (ref 11.5–14.5)
EST. GFR  (AFRICAN AMERICAN): >60 ML/MIN/1.73 M^2
EST. GFR  (AFRICAN AMERICAN): >60 ML/MIN/1.73 M^2
EST. GFR  (NON AFRICAN AMERICAN): >60 ML/MIN/1.73 M^2
EST. GFR  (NON AFRICAN AMERICAN): >60 ML/MIN/1.73 M^2
GLUCOSE SERPL-MCNC: 173 MG/DL (ref 70–110)
GLUCOSE SERPL-MCNC: 343 MG/DL (ref 70–110)
HCT VFR BLD AUTO: 39.5 % (ref 37–48.5)
HGB BLD-MCNC: 13.3 G/DL (ref 12–16)
IMM GRANULOCYTES # BLD AUTO: 0.02 K/UL (ref 0–0.04)
IMM GRANULOCYTES NFR BLD AUTO: 0.2 % (ref 0–0.5)
LYMPHOCYTES # BLD AUTO: 2.1 K/UL (ref 1–4.8)
LYMPHOCYTES NFR BLD: 24.3 % (ref 18–48)
MCH RBC QN AUTO: 29.9 PG (ref 27–31)
MCHC RBC AUTO-ENTMCNC: 33.7 G/DL (ref 32–36)
MCV RBC AUTO: 89 FL (ref 82–98)
MONOCYTES # BLD AUTO: 0.5 K/UL (ref 0.3–1)
MONOCYTES NFR BLD: 6.2 % (ref 4–15)
NEUTROPHILS # BLD AUTO: 5.9 K/UL (ref 1.8–7.7)
NEUTROPHILS NFR BLD: 67.7 % (ref 38–73)
NRBC BLD-RTO: 0 /100 WBC
PLATELET # BLD AUTO: 239 K/UL (ref 150–350)
PMV BLD AUTO: 9.6 FL (ref 9.2–12.9)
POCT GLUCOSE: 172 MG/DL (ref 70–110)
POCT GLUCOSE: 201 MG/DL (ref 70–110)
POTASSIUM SERPL-SCNC: 3.6 MMOL/L (ref 3.5–5.1)
POTASSIUM SERPL-SCNC: 3.9 MMOL/L (ref 3.5–5.1)
RBC # BLD AUTO: 4.45 M/UL (ref 4–5.4)
SODIUM SERPL-SCNC: 135 MMOL/L (ref 136–145)
SODIUM SERPL-SCNC: 137 MMOL/L (ref 136–145)
WBC # BLD AUTO: 8.69 K/UL (ref 3.9–12.7)

## 2021-02-23 PROCEDURE — 85025 COMPLETE CBC W/AUTO DIFF WBC: CPT

## 2021-02-23 PROCEDURE — 80048 BASIC METABOLIC PNL TOTAL CA: CPT | Mod: 91

## 2021-02-23 PROCEDURE — 94761 N-INVAS EAR/PLS OXIMETRY MLT: CPT

## 2021-02-23 PROCEDURE — 36415 COLL VENOUS BLD VENIPUNCTURE: CPT

## 2021-02-23 PROCEDURE — 80048 BASIC METABOLIC PNL TOTAL CA: CPT

## 2021-02-23 RX ADMIN — INSULIN ASPART 5 UNITS: 100 INJECTION, SOLUTION INTRAVENOUS; SUBCUTANEOUS at 11:02

## 2021-02-23 RX ADMIN — INSULIN ASPART 5 UNITS: 100 INJECTION, SOLUTION INTRAVENOUS; SUBCUTANEOUS at 08:02

## 2021-02-23 RX ADMIN — MUPIROCIN: 20 OINTMENT TOPICAL at 08:02

## 2021-02-23 RX ADMIN — INSULIN ASPART 2 UNITS: 100 INJECTION, SOLUTION INTRAVENOUS; SUBCUTANEOUS at 04:02

## 2021-02-23 RX ADMIN — INSULIN ASPART 4 UNITS: 100 INJECTION, SOLUTION INTRAVENOUS; SUBCUTANEOUS at 11:02

## 2021-02-24 ENCOUNTER — PATIENT MESSAGE (OUTPATIENT)
Dept: ENDOCRINOLOGY | Facility: CLINIC | Age: 31
End: 2021-02-24

## 2021-02-24 ENCOUNTER — TELEPHONE (OUTPATIENT)
Dept: MEDSURG UNIT | Facility: HOSPITAL | Age: 31
End: 2021-02-24

## 2021-02-24 DIAGNOSIS — E10.65 TYPE 1 DIABETES MELLITUS WITH HYPERGLYCEMIA: Primary | ICD-10-CM

## 2021-02-24 RX ORDER — INSULIN ASPART 100 [IU]/ML
INJECTION, SOLUTION INTRAVENOUS; SUBCUTANEOUS
Qty: 90 ML | Refills: 3 | Status: ON HOLD | OUTPATIENT
Start: 2021-02-24 | End: 2021-12-18 | Stop reason: SDUPTHER

## 2021-02-25 ENCOUNTER — PATIENT OUTREACH (OUTPATIENT)
Dept: ADMINISTRATIVE | Facility: CLINIC | Age: 31
End: 2021-02-25

## 2021-02-26 LAB
BACTERIA BLD CULT: NORMAL
BACTERIA BLD CULT: NORMAL

## 2021-05-06 ENCOUNTER — PATIENT MESSAGE (OUTPATIENT)
Dept: RESEARCH | Facility: HOSPITAL | Age: 31
End: 2021-05-06

## 2021-09-07 PROBLEM — E10.65 TYPE 1 DIABETES MELLITUS WITH HYPERGLYCEMIA: Status: RESOLVED | Noted: 2018-01-02 | Resolved: 2021-09-07

## 2021-09-07 PROBLEM — E10.10 DIABETIC KETOACIDOSIS WITHOUT COMA ASSOCIATED WITH TYPE 1 DIABETES MELLITUS: Status: RESOLVED | Noted: 2021-02-21 | Resolved: 2021-09-07

## 2021-10-20 ENCOUNTER — TELEPHONE (OUTPATIENT)
Dept: ENDOCRINOLOGY | Facility: CLINIC | Age: 31
End: 2021-10-20

## 2021-11-30 ENCOUNTER — TELEPHONE (OUTPATIENT)
Dept: ENDOCRINOLOGY | Facility: CLINIC | Age: 31
End: 2021-11-30
Payer: MEDICAID

## 2021-11-30 ENCOUNTER — LAB VISIT (OUTPATIENT)
Dept: LAB | Facility: HOSPITAL | Age: 31
End: 2021-11-30
Attending: PHYSICAL MEDICINE & REHABILITATION
Payer: MEDICAID

## 2021-11-30 DIAGNOSIS — E55.9 VITAMIN D DEFICIENCY: ICD-10-CM

## 2021-11-30 DIAGNOSIS — E78.00 ELEVATED CHOLESTEROL: ICD-10-CM

## 2021-11-30 DIAGNOSIS — E10.9 TYPE 1 DIABETES MELLITUS WITHOUT COMPLICATION: ICD-10-CM

## 2021-11-30 DIAGNOSIS — Z11.4 ENCOUNTER FOR SCREENING FOR HIV: ICD-10-CM

## 2021-11-30 DIAGNOSIS — Z11.59 NEED FOR HEPATITIS C SCREENING TEST: ICD-10-CM

## 2021-11-30 DIAGNOSIS — R53.82 CHRONIC FATIGUE: ICD-10-CM

## 2021-11-30 LAB
25(OH)D3+25(OH)D2 SERPL-MCNC: 28 NG/ML (ref 30–96)
ALBUMIN SERPL BCP-MCNC: 3.9 G/DL (ref 3.5–5.2)
ALP SERPL-CCNC: 80 U/L (ref 55–135)
ALT SERPL W/O P-5'-P-CCNC: 10 U/L (ref 10–44)
ANION GAP SERPL CALC-SCNC: 13 MMOL/L (ref 8–16)
AST SERPL-CCNC: 13 U/L (ref 10–40)
BASOPHILS # BLD AUTO: 0.03 K/UL (ref 0–0.2)
BASOPHILS NFR BLD: 0.4 % (ref 0–1.9)
BILIRUB SERPL-MCNC: 0.5 MG/DL (ref 0.1–1)
BUN SERPL-MCNC: 12 MG/DL (ref 6–20)
CALCIUM SERPL-MCNC: 9.4 MG/DL (ref 8.7–10.5)
CHLORIDE SERPL-SCNC: 95 MMOL/L (ref 95–110)
CHOLEST SERPL-MCNC: 237 MG/DL (ref 120–199)
CHOLEST/HDLC SERPL: 2.8 {RATIO} (ref 2–5)
CO2 SERPL-SCNC: 25 MMOL/L (ref 23–29)
CREAT SERPL-MCNC: 0.7 MG/DL (ref 0.5–1.4)
DIFFERENTIAL METHOD: NORMAL
EOSINOPHIL # BLD AUTO: 0.1 K/UL (ref 0–0.5)
EOSINOPHIL NFR BLD: 1.9 % (ref 0–8)
ERYTHROCYTE [DISTWIDTH] IN BLOOD BY AUTOMATED COUNT: 11.6 % (ref 11.5–14.5)
EST. GFR  (AFRICAN AMERICAN): >60 ML/MIN/1.73 M^2
EST. GFR  (NON AFRICAN AMERICAN): >60 ML/MIN/1.73 M^2
ESTIMATED AVG GLUCOSE: 355 MG/DL (ref 68–131)
GLUCOSE SERPL-MCNC: 235 MG/DL (ref 70–110)
HBA1C MFR BLD: 14 % (ref 4–5.6)
HCT VFR BLD AUTO: 46.8 % (ref 37–48.5)
HDLC SERPL-MCNC: 84 MG/DL (ref 40–75)
HDLC SERPL: 35.4 % (ref 20–50)
HGB BLD-MCNC: 15.1 G/DL (ref 12–16)
IMM GRANULOCYTES # BLD AUTO: 0.02 K/UL (ref 0–0.04)
IMM GRANULOCYTES NFR BLD AUTO: 0.3 % (ref 0–0.5)
LDLC SERPL CALC-MCNC: 130.2 MG/DL (ref 63–159)
LYMPHOCYTES # BLD AUTO: 1.8 K/UL (ref 1–4.8)
LYMPHOCYTES NFR BLD: 26.2 % (ref 18–48)
MCH RBC QN AUTO: 28.4 PG (ref 27–31)
MCHC RBC AUTO-ENTMCNC: 32.3 G/DL (ref 32–36)
MCV RBC AUTO: 88 FL (ref 82–98)
MONOCYTES # BLD AUTO: 0.3 K/UL (ref 0.3–1)
MONOCYTES NFR BLD: 4.8 % (ref 4–15)
NEUTROPHILS # BLD AUTO: 4.6 K/UL (ref 1.8–7.7)
NEUTROPHILS NFR BLD: 66.4 % (ref 38–73)
NONHDLC SERPL-MCNC: 153 MG/DL
NRBC BLD-RTO: 0 /100 WBC
PLATELET # BLD AUTO: 307 K/UL (ref 150–450)
PMV BLD AUTO: 9.8 FL (ref 9.2–12.9)
POTASSIUM SERPL-SCNC: 3.7 MMOL/L (ref 3.5–5.1)
PROT SERPL-MCNC: 7.4 G/DL (ref 6–8.4)
RBC # BLD AUTO: 5.32 M/UL (ref 4–5.4)
SODIUM SERPL-SCNC: 133 MMOL/L (ref 136–145)
TRIGL SERPL-MCNC: 114 MG/DL (ref 30–150)
TSH SERPL DL<=0.005 MIU/L-ACNC: 0.94 UIU/ML (ref 0.4–4)
WBC # BLD AUTO: 6.94 K/UL (ref 3.9–12.7)

## 2021-11-30 PROCEDURE — 80061 LIPID PANEL: CPT | Performed by: PHYSICAL MEDICINE & REHABILITATION

## 2021-11-30 PROCEDURE — 84443 ASSAY THYROID STIM HORMONE: CPT | Performed by: PHYSICAL MEDICINE & REHABILITATION

## 2021-11-30 PROCEDURE — 87389 HIV-1 AG W/HIV-1&-2 AB AG IA: CPT | Performed by: PHYSICAL MEDICINE & REHABILITATION

## 2021-11-30 PROCEDURE — 83036 HEMOGLOBIN GLYCOSYLATED A1C: CPT | Performed by: PHYSICAL MEDICINE & REHABILITATION

## 2021-11-30 PROCEDURE — 85025 COMPLETE CBC W/AUTO DIFF WBC: CPT | Performed by: PHYSICAL MEDICINE & REHABILITATION

## 2021-11-30 PROCEDURE — 36415 COLL VENOUS BLD VENIPUNCTURE: CPT | Mod: PO | Performed by: PHYSICAL MEDICINE & REHABILITATION

## 2021-11-30 PROCEDURE — 82306 VITAMIN D 25 HYDROXY: CPT | Performed by: PHYSICAL MEDICINE & REHABILITATION

## 2021-11-30 PROCEDURE — 86803 HEPATITIS C AB TEST: CPT | Performed by: PHYSICAL MEDICINE & REHABILITATION

## 2021-11-30 PROCEDURE — 80053 COMPREHEN METABOLIC PANEL: CPT | Performed by: PHYSICAL MEDICINE & REHABILITATION

## 2021-12-01 LAB
HCV AB SERPL QL IA: NEGATIVE
HIV 1+2 AB+HIV1 P24 AG SERPL QL IA: NEGATIVE

## 2021-12-16 ENCOUNTER — HOSPITAL ENCOUNTER (INPATIENT)
Facility: HOSPITAL | Age: 31
LOS: 2 days | Discharge: HOME | DRG: 637 | End: 2021-12-18
Attending: EMERGENCY MEDICINE | Admitting: INTERNAL MEDICINE
Payer: MEDICAID

## 2021-12-16 DIAGNOSIS — E11.10 DKA (DIABETIC KETOACIDOSIS): ICD-10-CM

## 2021-12-16 DIAGNOSIS — E10.65 TYPE 1 DIABETES MELLITUS WITH HYPERGLYCEMIA: ICD-10-CM

## 2021-12-16 DIAGNOSIS — D72.829 LEUKOCYTOSIS: ICD-10-CM

## 2021-12-16 LAB
ALBUMIN SERPL BCP-MCNC: 4.2 G/DL (ref 3.5–5.2)
ALLENS TEST: ABNORMAL
ALP SERPL-CCNC: 102 U/L (ref 55–135)
ALT SERPL W/O P-5'-P-CCNC: 14 U/L (ref 10–44)
ANION GAP SERPL CALC-SCNC: 15 MMOL/L (ref 8–16)
ANION GAP SERPL CALC-SCNC: 19 MMOL/L (ref 8–16)
ANION GAP SERPL CALC-SCNC: 21 MMOL/L (ref 8–16)
ANION GAP SERPL CALC-SCNC: 23 MMOL/L (ref 8–16)
AST SERPL-CCNC: 16 U/L (ref 10–40)
B-OH-BUTYR BLD STRIP-SCNC: 5.7 MMOL/L (ref 0–0.5)
B-OH-BUTYR BLD STRIP-SCNC: 6.1 MMOL/L (ref 0–0.5)
BACTERIA #/AREA URNS HPF: ABNORMAL /HPF
BASOPHILS # BLD AUTO: 0.07 K/UL (ref 0–0.2)
BASOPHILS NFR BLD: 0.3 % (ref 0–1.9)
BILIRUB SERPL-MCNC: 0.3 MG/DL (ref 0.1–1)
BILIRUB UR QL STRIP: NEGATIVE
BUN SERPL-MCNC: 13 MG/DL (ref 6–20)
BUN SERPL-MCNC: 18 MG/DL (ref 6–20)
BUN SERPL-MCNC: 19 MG/DL (ref 6–20)
BUN SERPL-MCNC: 21 MG/DL (ref 6–20)
CALCIUM SERPL-MCNC: 8.3 MG/DL (ref 8.7–10.5)
CALCIUM SERPL-MCNC: 8.8 MG/DL (ref 8.7–10.5)
CALCIUM SERPL-MCNC: 8.9 MG/DL (ref 8.7–10.5)
CALCIUM SERPL-MCNC: 9.2 MG/DL (ref 8.7–10.5)
CHLORIDE SERPL-SCNC: 102 MMOL/L (ref 95–110)
CHLORIDE SERPL-SCNC: 107 MMOL/L (ref 95–110)
CHLORIDE SERPL-SCNC: 108 MMOL/L (ref 95–110)
CHLORIDE SERPL-SCNC: 110 MMOL/L (ref 95–110)
CLARITY UR: ABNORMAL
CO2 SERPL-SCNC: 10 MMOL/L (ref 23–29)
CO2 SERPL-SCNC: 8 MMOL/L (ref 23–29)
CO2 SERPL-SCNC: 8 MMOL/L (ref 23–29)
CO2 SERPL-SCNC: 9 MMOL/L (ref 23–29)
COLOR UR: YELLOW
CREAT SERPL-MCNC: 1 MG/DL (ref 0.5–1.4)
CREAT SERPL-MCNC: 1 MG/DL (ref 0.5–1.4)
CREAT SERPL-MCNC: 1.1 MG/DL (ref 0.5–1.4)
CREAT SERPL-MCNC: 1.2 MG/DL (ref 0.5–1.4)
DELSYS: ABNORMAL
DIFFERENTIAL METHOD: ABNORMAL
EOSINOPHIL # BLD AUTO: 0 K/UL (ref 0–0.5)
EOSINOPHIL NFR BLD: 0.2 % (ref 0–8)
ERYTHROCYTE [DISTWIDTH] IN BLOOD BY AUTOMATED COUNT: 11.7 % (ref 11.5–14.5)
EST. GFR  (AFRICAN AMERICAN): >60 ML/MIN/1.73 M^2
EST. GFR  (NON AFRICAN AMERICAN): >60 ML/MIN/1.73 M^2
GLUCOSE SERPL-MCNC: 257 MG/DL (ref 70–110)
GLUCOSE SERPL-MCNC: 269 MG/DL (ref 70–110)
GLUCOSE SERPL-MCNC: 405 MG/DL (ref 70–110)
GLUCOSE SERPL-MCNC: 462 MG/DL (ref 70–110)
GLUCOSE UR QL STRIP: ABNORMAL
HCG SERPL QL: NEGATIVE
HCO3 UR-SCNC: 8.2 MMOL/L (ref 24–28)
HCT VFR BLD AUTO: 45.1 % (ref 37–48.5)
HGB BLD-MCNC: 14.6 G/DL (ref 12–16)
HGB UR QL STRIP: ABNORMAL
HYALINE CASTS #/AREA URNS LPF: 0 /LPF
IMM GRANULOCYTES # BLD AUTO: 0.23 K/UL (ref 0–0.04)
IMM GRANULOCYTES NFR BLD AUTO: 0.9 % (ref 0–0.5)
KETONES UR QL STRIP: ABNORMAL
LACTATE SERPL-SCNC: 2.1 MMOL/L (ref 0.5–2.2)
LEUKOCYTE ESTERASE UR QL STRIP: NEGATIVE
LYMPHOCYTES # BLD AUTO: 1.8 K/UL (ref 1–4.8)
LYMPHOCYTES NFR BLD: 6.8 % (ref 18–48)
MCH RBC QN AUTO: 28.7 PG (ref 27–31)
MCHC RBC AUTO-ENTMCNC: 32.4 G/DL (ref 32–36)
MCV RBC AUTO: 89 FL (ref 82–98)
MICROSCOPIC COMMENT: ABNORMAL
MODE: ABNORMAL
MONOCYTES # BLD AUTO: 0.7 K/UL (ref 0.3–1)
MONOCYTES NFR BLD: 2.6 % (ref 4–15)
NEUTROPHILS # BLD AUTO: 23.8 K/UL (ref 1.8–7.7)
NEUTROPHILS NFR BLD: 89.2 % (ref 38–73)
NITRITE UR QL STRIP: NEGATIVE
NRBC BLD-RTO: 0 /100 WBC
PCO2 BLDA: 27.6 MMHG (ref 35–45)
PH SMN: 7.08 [PH] (ref 7.35–7.45)
PH UR STRIP: 5 [PH] (ref 5–8)
PLATELET # BLD AUTO: 351 K/UL (ref 150–450)
PMV BLD AUTO: 10.4 FL (ref 9.2–12.9)
PO2 BLDA: 44 MMHG (ref 40–60)
POC BE: -22 MMOL/L
POC SATURATED O2: 63 % (ref 95–100)
POC TCO2: 9 MMOL/L (ref 24–29)
POCT GLUCOSE: 228 MG/DL (ref 70–110)
POCT GLUCOSE: 239 MG/DL (ref 70–110)
POCT GLUCOSE: 241 MG/DL (ref 70–110)
POCT GLUCOSE: 260 MG/DL (ref 70–110)
POCT GLUCOSE: 268 MG/DL (ref 70–110)
POCT GLUCOSE: 271 MG/DL (ref 70–110)
POCT GLUCOSE: 272 MG/DL (ref 70–110)
POCT GLUCOSE: 280 MG/DL (ref 70–110)
POCT GLUCOSE: 288 MG/DL (ref 70–110)
POCT GLUCOSE: 295 MG/DL (ref 70–110)
POCT GLUCOSE: 389 MG/DL (ref 70–110)
POCT GLUCOSE: 456 MG/DL (ref 70–110)
POCT GLUCOSE: >500 MG/DL (ref 70–110)
POCT GLUCOSE: >500 MG/DL (ref 70–110)
POTASSIUM SERPL-SCNC: 4.5 MMOL/L (ref 3.5–5.1)
POTASSIUM SERPL-SCNC: 4.7 MMOL/L (ref 3.5–5.1)
POTASSIUM SERPL-SCNC: 4.8 MMOL/L (ref 3.5–5.1)
POTASSIUM SERPL-SCNC: 5 MMOL/L (ref 3.5–5.1)
PROT SERPL-MCNC: 7.9 G/DL (ref 6–8.4)
PROT UR QL STRIP: ABNORMAL
RBC # BLD AUTO: 5.08 M/UL (ref 4–5.4)
RBC #/AREA URNS HPF: 25 /HPF (ref 0–4)
SAMPLE: ABNORMAL
SARS-COV-2 RDRP RESP QL NAA+PROBE: NEGATIVE
SITE: ABNORMAL
SODIUM SERPL-SCNC: 133 MMOL/L (ref 136–145)
SODIUM SERPL-SCNC: 134 MMOL/L (ref 136–145)
SODIUM SERPL-SCNC: 136 MMOL/L (ref 136–145)
SODIUM SERPL-SCNC: 137 MMOL/L (ref 136–145)
SP GR UR STRIP: >=1.03 (ref 1–1.03)
SQUAMOUS #/AREA URNS HPF: 2 /HPF
URN SPEC COLLECT METH UR: ABNORMAL
UROBILINOGEN UR STRIP-ACNC: NEGATIVE EU/DL
WBC # BLD AUTO: 26.66 K/UL (ref 3.9–12.7)
WBC #/AREA URNS HPF: 0 /HPF (ref 0–5)
YEAST URNS QL MICRO: ABNORMAL

## 2021-12-16 PROCEDURE — 99291 CRITICAL CARE FIRST HOUR: CPT | Mod: 25

## 2021-12-16 PROCEDURE — 82962 GLUCOSE BLOOD TEST: CPT

## 2021-12-16 PROCEDURE — 25000003 PHARM REV CODE 250: Performed by: EMERGENCY MEDICINE

## 2021-12-16 PROCEDURE — 93005 ELECTROCARDIOGRAM TRACING: CPT

## 2021-12-16 PROCEDURE — 25000003 PHARM REV CODE 250: Performed by: INTERNAL MEDICINE

## 2021-12-16 PROCEDURE — 80053 COMPREHEN METABOLIC PANEL: CPT | Performed by: EMERGENCY MEDICINE

## 2021-12-16 PROCEDURE — 82010 KETONE BODYS QUAN: CPT | Mod: 91 | Performed by: INTERNAL MEDICINE

## 2021-12-16 PROCEDURE — 83605 ASSAY OF LACTIC ACID: CPT | Performed by: INTERNAL MEDICINE

## 2021-12-16 PROCEDURE — 82803 BLOOD GASES ANY COMBINATION: CPT

## 2021-12-16 PROCEDURE — 80048 BASIC METABOLIC PNL TOTAL CA: CPT | Performed by: INTERNAL MEDICINE

## 2021-12-16 PROCEDURE — 20000000 HC ICU ROOM

## 2021-12-16 PROCEDURE — 99900035 HC TECH TIME PER 15 MIN (STAT)

## 2021-12-16 PROCEDURE — U0002 COVID-19 LAB TEST NON-CDC: HCPCS | Performed by: EMERGENCY MEDICINE

## 2021-12-16 PROCEDURE — 63600175 PHARM REV CODE 636 W HCPCS: Performed by: INTERNAL MEDICINE

## 2021-12-16 PROCEDURE — 63600175 PHARM REV CODE 636 W HCPCS: Performed by: EMERGENCY MEDICINE

## 2021-12-16 PROCEDURE — 85025 COMPLETE CBC W/AUTO DIFF WBC: CPT | Performed by: EMERGENCY MEDICINE

## 2021-12-16 PROCEDURE — 81000 URINALYSIS NONAUTO W/SCOPE: CPT | Performed by: EMERGENCY MEDICINE

## 2021-12-16 PROCEDURE — 96374 THER/PROPH/DIAG INJ IV PUSH: CPT

## 2021-12-16 PROCEDURE — 93010 ELECTROCARDIOGRAM REPORT: CPT | Mod: ,,, | Performed by: GENERAL PRACTICE

## 2021-12-16 PROCEDURE — 82010 KETONE BODYS QUAN: CPT | Performed by: EMERGENCY MEDICINE

## 2021-12-16 PROCEDURE — S5010 5% DEXTROSE AND 0.45% SALINE: HCPCS | Performed by: INTERNAL MEDICINE

## 2021-12-16 PROCEDURE — 84703 CHORIONIC GONADOTROPIN ASSAY: CPT | Performed by: INTERNAL MEDICINE

## 2021-12-16 PROCEDURE — 94761 N-INVAS EAR/PLS OXIMETRY MLT: CPT

## 2021-12-16 PROCEDURE — 36415 COLL VENOUS BLD VENIPUNCTURE: CPT | Performed by: INTERNAL MEDICINE

## 2021-12-16 PROCEDURE — 36415 COLL VENOUS BLD VENIPUNCTURE: CPT | Performed by: EMERGENCY MEDICINE

## 2021-12-16 PROCEDURE — 93010 EKG 12-LEAD: ICD-10-PCS | Mod: ,,, | Performed by: GENERAL PRACTICE

## 2021-12-16 PROCEDURE — 96361 HYDRATE IV INFUSION ADD-ON: CPT

## 2021-12-16 RX ORDER — MUPIROCIN 20 MG/G
OINTMENT TOPICAL 2 TIMES DAILY
Status: DISCONTINUED | OUTPATIENT
Start: 2021-12-16 | End: 2021-12-18 | Stop reason: HOSPADM

## 2021-12-16 RX ORDER — METOCLOPRAMIDE HYDROCHLORIDE 5 MG/ML
10 INJECTION INTRAMUSCULAR; INTRAVENOUS
Status: COMPLETED | OUTPATIENT
Start: 2021-12-16 | End: 2021-12-16

## 2021-12-16 RX ORDER — ONDANSETRON 2 MG/ML
4 INJECTION INTRAMUSCULAR; INTRAVENOUS EVERY 6 HOURS PRN
Status: DISCONTINUED | OUTPATIENT
Start: 2021-12-16 | End: 2021-12-18 | Stop reason: HOSPADM

## 2021-12-16 RX ORDER — MORPHINE SULFATE 2 MG/ML
2 INJECTION, SOLUTION INTRAMUSCULAR; INTRAVENOUS EVERY 4 HOURS PRN
Status: DISCONTINUED | OUTPATIENT
Start: 2021-12-16 | End: 2021-12-18 | Stop reason: HOSPADM

## 2021-12-16 RX ORDER — DEXTROSE MONOHYDRATE 100 MG/ML
INJECTION, SOLUTION INTRAVENOUS
Status: DISCONTINUED | OUTPATIENT
Start: 2021-12-16 | End: 2021-12-18 | Stop reason: HOSPADM

## 2021-12-16 RX ORDER — SODIUM CHLORIDE 9 MG/ML
125 INJECTION, SOLUTION INTRAVENOUS CONTINUOUS
Status: DISCONTINUED | OUTPATIENT
Start: 2021-12-16 | End: 2021-12-18 | Stop reason: HOSPADM

## 2021-12-16 RX ORDER — ACETAMINOPHEN 325 MG/1
650 TABLET ORAL EVERY 4 HOURS PRN
Status: DISCONTINUED | OUTPATIENT
Start: 2021-12-16 | End: 2021-12-18 | Stop reason: HOSPADM

## 2021-12-16 RX ORDER — SODIUM CHLORIDE 0.9 % (FLUSH) 0.9 %
10 SYRINGE (ML) INJECTION
Status: DISCONTINUED | OUTPATIENT
Start: 2021-12-16 | End: 2021-12-18 | Stop reason: HOSPADM

## 2021-12-16 RX ORDER — FAMOTIDINE 10 MG/ML
20 INJECTION INTRAVENOUS EVERY 12 HOURS
Status: DISCONTINUED | OUTPATIENT
Start: 2021-12-16 | End: 2021-12-18 | Stop reason: HOSPADM

## 2021-12-16 RX ORDER — DEXTROSE MONOHYDRATE AND SODIUM CHLORIDE 5; .45 G/100ML; G/100ML
125 INJECTION, SOLUTION INTRAVENOUS CONTINUOUS PRN
Status: DISCONTINUED | OUTPATIENT
Start: 2021-12-16 | End: 2021-12-18 | Stop reason: HOSPADM

## 2021-12-16 RX ADMIN — SODIUM CHLORIDE 125 ML/HR: 0.9 INJECTION, SOLUTION INTRAVENOUS at 10:12

## 2021-12-16 RX ADMIN — MUPIROCIN: 20 OINTMENT TOPICAL at 11:12

## 2021-12-16 RX ADMIN — METOCLOPRAMIDE 10 MG: 5 INJECTION, SOLUTION INTRAMUSCULAR; INTRAVENOUS at 07:12

## 2021-12-16 RX ADMIN — FAMOTIDINE 20 MG: 10 INJECTION, SOLUTION INTRAVENOUS at 11:12

## 2021-12-16 RX ADMIN — SODIUM CHLORIDE, SODIUM LACTATE, POTASSIUM CHLORIDE, AND CALCIUM CHLORIDE 1000 ML: .6; .31; .03; .02 INJECTION, SOLUTION INTRAVENOUS at 07:12

## 2021-12-16 RX ADMIN — DEXTROSE AND SODIUM CHLORIDE 125 ML/HR: 5; .45 INJECTION, SOLUTION INTRAVENOUS at 02:12

## 2021-12-16 RX ADMIN — SODIUM CHLORIDE 7 UNITS/HR: 9 INJECTION, SOLUTION INTRAVENOUS at 09:12

## 2021-12-16 RX ADMIN — ONDANSETRON 4 MG: 2 INJECTION INTRAMUSCULAR; INTRAVENOUS at 02:12

## 2021-12-16 RX ADMIN — FAMOTIDINE 20 MG: 10 INJECTION, SOLUTION INTRAVENOUS at 08:12

## 2021-12-16 RX ADMIN — DEXTROSE AND SODIUM CHLORIDE 125 ML/HR: 5; .45 INJECTION, SOLUTION INTRAVENOUS at 10:12

## 2021-12-16 RX ADMIN — MUPIROCIN: 20 OINTMENT TOPICAL at 08:12

## 2021-12-17 PROBLEM — K85.90 ACUTE PANCREATITIS: Status: ACTIVE | Noted: 2021-12-17

## 2021-12-17 LAB
ANION GAP SERPL CALC-SCNC: 12 MMOL/L (ref 8–16)
ANION GAP SERPL CALC-SCNC: 12 MMOL/L (ref 8–16)
ANION GAP SERPL CALC-SCNC: 13 MMOL/L (ref 8–16)
BASOPHILS # BLD AUTO: 0.02 K/UL (ref 0–0.2)
BASOPHILS NFR BLD: 0.1 % (ref 0–1.9)
BUN SERPL-MCNC: 10 MG/DL (ref 6–20)
BUN SERPL-MCNC: 11 MG/DL (ref 6–20)
BUN SERPL-MCNC: 12 MG/DL (ref 6–20)
BUN SERPL-MCNC: 13 MG/DL (ref 6–20)
BUN SERPL-MCNC: 9 MG/DL (ref 6–20)
CALCIUM SERPL-MCNC: 8.1 MG/DL (ref 8.7–10.5)
CALCIUM SERPL-MCNC: 8.2 MG/DL (ref 8.7–10.5)
CALCIUM SERPL-MCNC: 8.2 MG/DL (ref 8.7–10.5)
CALCIUM SERPL-MCNC: 8.3 MG/DL (ref 8.7–10.5)
CALCIUM SERPL-MCNC: 8.3 MG/DL (ref 8.7–10.5)
CHLORIDE SERPL-SCNC: 107 MMOL/L (ref 95–110)
CHLORIDE SERPL-SCNC: 108 MMOL/L (ref 95–110)
CHLORIDE SERPL-SCNC: 108 MMOL/L (ref 95–110)
CO2 SERPL-SCNC: 13 MMOL/L (ref 23–29)
CO2 SERPL-SCNC: 16 MMOL/L (ref 23–29)
CREAT SERPL-MCNC: 0.8 MG/DL (ref 0.5–1.4)
CREAT SERPL-MCNC: 0.9 MG/DL (ref 0.5–1.4)
DIFFERENTIAL METHOD: ABNORMAL
EOSINOPHIL # BLD AUTO: 0.1 K/UL (ref 0–0.5)
EOSINOPHIL NFR BLD: 0.7 % (ref 0–8)
ERYTHROCYTE [DISTWIDTH] IN BLOOD BY AUTOMATED COUNT: 12 % (ref 11.5–14.5)
EST. GFR  (AFRICAN AMERICAN): >60 ML/MIN/1.73 M^2
EST. GFR  (NON AFRICAN AMERICAN): >60 ML/MIN/1.73 M^2
GLUCOSE SERPL-MCNC: 253 MG/DL (ref 70–110)
GLUCOSE SERPL-MCNC: 255 MG/DL (ref 70–110)
GLUCOSE SERPL-MCNC: 265 MG/DL (ref 70–110)
GLUCOSE SERPL-MCNC: 273 MG/DL (ref 70–110)
GLUCOSE SERPL-MCNC: 291 MG/DL (ref 70–110)
HCT VFR BLD AUTO: 44 % (ref 37–48.5)
HGB BLD-MCNC: 14.4 G/DL (ref 12–16)
IMM GRANULOCYTES # BLD AUTO: 0.04 K/UL (ref 0–0.04)
IMM GRANULOCYTES NFR BLD AUTO: 0.3 % (ref 0–0.5)
LIPASE SERPL-CCNC: >1000 U/L (ref 4–60)
LYMPHOCYTES # BLD AUTO: 1.6 K/UL (ref 1–4.8)
LYMPHOCYTES NFR BLD: 11.4 % (ref 18–48)
MCH RBC QN AUTO: 29 PG (ref 27–31)
MCHC RBC AUTO-ENTMCNC: 32.7 G/DL (ref 32–36)
MCV RBC AUTO: 89 FL (ref 82–98)
MONOCYTES # BLD AUTO: 0.8 K/UL (ref 0.3–1)
MONOCYTES NFR BLD: 6 % (ref 4–15)
NEUTROPHILS # BLD AUTO: 11.3 K/UL (ref 1.8–7.7)
NEUTROPHILS NFR BLD: 81.5 % (ref 38–73)
NRBC BLD-RTO: 0 /100 WBC
PHOSPHATE SERPL-MCNC: 1.8 MG/DL (ref 2.7–4.5)
PLATELET # BLD AUTO: 281 K/UL (ref 150–450)
PMV BLD AUTO: 9.3 FL (ref 9.2–12.9)
POCT GLUCOSE: 191 MG/DL (ref 70–110)
POCT GLUCOSE: 230 MG/DL (ref 70–110)
POCT GLUCOSE: 231 MG/DL (ref 70–110)
POCT GLUCOSE: 239 MG/DL (ref 70–110)
POCT GLUCOSE: 241 MG/DL (ref 70–110)
POCT GLUCOSE: 244 MG/DL (ref 70–110)
POCT GLUCOSE: 247 MG/DL (ref 70–110)
POCT GLUCOSE: 250 MG/DL (ref 70–110)
POCT GLUCOSE: 268 MG/DL (ref 70–110)
POCT GLUCOSE: 269 MG/DL (ref 70–110)
POCT GLUCOSE: 276 MG/DL (ref 70–110)
POCT GLUCOSE: 279 MG/DL (ref 70–110)
POCT GLUCOSE: 283 MG/DL (ref 70–110)
POCT GLUCOSE: 329 MG/DL (ref 70–110)
POTASSIUM SERPL-SCNC: 3.8 MMOL/L (ref 3.5–5.1)
POTASSIUM SERPL-SCNC: 3.9 MMOL/L (ref 3.5–5.1)
POTASSIUM SERPL-SCNC: 4 MMOL/L (ref 3.5–5.1)
POTASSIUM SERPL-SCNC: 4 MMOL/L (ref 3.5–5.1)
POTASSIUM SERPL-SCNC: 4.1 MMOL/L (ref 3.5–5.1)
RBC # BLD AUTO: 4.97 M/UL (ref 4–5.4)
SODIUM SERPL-SCNC: 133 MMOL/L (ref 136–145)
SODIUM SERPL-SCNC: 134 MMOL/L (ref 136–145)
SODIUM SERPL-SCNC: 135 MMOL/L (ref 136–145)
WBC # BLD AUTO: 13.84 K/UL (ref 3.9–12.7)

## 2021-12-17 PROCEDURE — 63600175 PHARM REV CODE 636 W HCPCS: Performed by: INTERNAL MEDICINE

## 2021-12-17 PROCEDURE — 83690 ASSAY OF LIPASE: CPT | Performed by: INTERNAL MEDICINE

## 2021-12-17 PROCEDURE — 94761 N-INVAS EAR/PLS OXIMETRY MLT: CPT

## 2021-12-17 PROCEDURE — S5010 5% DEXTROSE AND 0.45% SALINE: HCPCS | Performed by: INTERNAL MEDICINE

## 2021-12-17 PROCEDURE — C9399 UNCLASSIFIED DRUGS OR BIOLOG: HCPCS | Performed by: INTERNAL MEDICINE

## 2021-12-17 PROCEDURE — 36415 COLL VENOUS BLD VENIPUNCTURE: CPT | Performed by: INTERNAL MEDICINE

## 2021-12-17 PROCEDURE — 80048 BASIC METABOLIC PNL TOTAL CA: CPT | Mod: 91 | Performed by: INTERNAL MEDICINE

## 2021-12-17 PROCEDURE — 20000000 HC ICU ROOM

## 2021-12-17 PROCEDURE — 25000003 PHARM REV CODE 250: Performed by: INTERNAL MEDICINE

## 2021-12-17 PROCEDURE — 85025 COMPLETE CBC W/AUTO DIFF WBC: CPT | Performed by: INTERNAL MEDICINE

## 2021-12-17 PROCEDURE — 84100 ASSAY OF PHOSPHORUS: CPT | Performed by: INTERNAL MEDICINE

## 2021-12-17 RX ORDER — GLUCAGON 1 MG
1 KIT INJECTION
Status: DISCONTINUED | OUTPATIENT
Start: 2021-12-17 | End: 2021-12-18 | Stop reason: HOSPADM

## 2021-12-17 RX ORDER — SODIUM,POTASSIUM PHOSPHATES 280-250MG
1 POWDER IN PACKET (EA) ORAL ONCE
Status: COMPLETED | OUTPATIENT
Start: 2021-12-17 | End: 2021-12-17

## 2021-12-17 RX ORDER — INSULIN ASPART 100 [IU]/ML
1-10 INJECTION, SOLUTION INTRAVENOUS; SUBCUTANEOUS
Status: DISCONTINUED | OUTPATIENT
Start: 2021-12-17 | End: 2021-12-18 | Stop reason: HOSPADM

## 2021-12-17 RX ORDER — IBUPROFEN 200 MG
24 TABLET ORAL
Status: DISCONTINUED | OUTPATIENT
Start: 2021-12-17 | End: 2021-12-18 | Stop reason: HOSPADM

## 2021-12-17 RX ORDER — IBUPROFEN 200 MG
16 TABLET ORAL
Status: DISCONTINUED | OUTPATIENT
Start: 2021-12-17 | End: 2021-12-18 | Stop reason: HOSPADM

## 2021-12-17 RX ORDER — INSULIN ASPART 100 [IU]/ML
6 INJECTION, SOLUTION INTRAVENOUS; SUBCUTANEOUS
Status: DISCONTINUED | OUTPATIENT
Start: 2021-12-17 | End: 2021-12-18 | Stop reason: HOSPADM

## 2021-12-17 RX ADMIN — INSULIN ASPART 6 UNITS: 100 INJECTION, SOLUTION INTRAVENOUS; SUBCUTANEOUS at 05:12

## 2021-12-17 RX ADMIN — INSULIN ASPART 1 UNITS: 100 INJECTION, SOLUTION INTRAVENOUS; SUBCUTANEOUS at 10:12

## 2021-12-17 RX ADMIN — MUPIROCIN: 20 OINTMENT TOPICAL at 08:12

## 2021-12-17 RX ADMIN — SODIUM CHLORIDE 125 ML/HR: 0.9 INJECTION, SOLUTION INTRAVENOUS at 02:12

## 2021-12-17 RX ADMIN — DEXTROSE AND SODIUM CHLORIDE 125 ML/HR: 5; .45 INJECTION, SOLUTION INTRAVENOUS at 05:12

## 2021-12-17 RX ADMIN — FAMOTIDINE 20 MG: 10 INJECTION, SOLUTION INTRAVENOUS at 09:12

## 2021-12-17 RX ADMIN — INSULIN DETEMIR 15 UNITS: 100 INJECTION, SOLUTION SUBCUTANEOUS at 10:12

## 2021-12-17 RX ADMIN — SODIUM CHLORIDE 125 ML/HR: 0.9 INJECTION, SOLUTION INTRAVENOUS at 10:12

## 2021-12-17 RX ADMIN — INSULIN ASPART 6 UNITS: 100 INJECTION, SOLUTION INTRAVENOUS; SUBCUTANEOUS at 12:12

## 2021-12-17 RX ADMIN — POTASSIUM & SODIUM PHOSPHATES POWDER PACK 280-160-250 MG 1 PACKET: 280-160-250 PACK at 11:12

## 2021-12-17 RX ADMIN — FAMOTIDINE 20 MG: 10 INJECTION, SOLUTION INTRAVENOUS at 08:12

## 2021-12-18 VITALS
SYSTOLIC BLOOD PRESSURE: 111 MMHG | DIASTOLIC BLOOD PRESSURE: 66 MMHG | RESPIRATION RATE: 20 BRPM | TEMPERATURE: 98 F | HEIGHT: 70 IN | OXYGEN SATURATION: 99 % | WEIGHT: 149.94 LBS | HEART RATE: 99 BPM | BODY MASS INDEX: 21.47 KG/M2

## 2021-12-18 PROBLEM — E11.10 DKA (DIABETIC KETOACIDOSIS): Status: RESOLVED | Noted: 2021-02-21 | Resolved: 2021-12-18

## 2021-12-18 LAB
ANION GAP SERPL CALC-SCNC: 11 MMOL/L (ref 8–16)
BASOPHILS # BLD AUTO: 0.02 K/UL (ref 0–0.2)
BASOPHILS NFR BLD: 0.2 % (ref 0–1.9)
BUN SERPL-MCNC: 8 MG/DL (ref 6–20)
CALCIUM SERPL-MCNC: 7.7 MG/DL (ref 8.7–10.5)
CHLORIDE SERPL-SCNC: 111 MMOL/L (ref 95–110)
CO2 SERPL-SCNC: 16 MMOL/L (ref 23–29)
CREAT SERPL-MCNC: 0.7 MG/DL (ref 0.5–1.4)
DIFFERENTIAL METHOD: NORMAL
EOSINOPHIL # BLD AUTO: 0.2 K/UL (ref 0–0.5)
EOSINOPHIL NFR BLD: 2.8 % (ref 0–8)
ERYTHROCYTE [DISTWIDTH] IN BLOOD BY AUTOMATED COUNT: 12 % (ref 11.5–14.5)
EST. GFR  (AFRICAN AMERICAN): >60 ML/MIN/1.73 M^2
EST. GFR  (NON AFRICAN AMERICAN): >60 ML/MIN/1.73 M^2
GLUCOSE SERPL-MCNC: 151 MG/DL (ref 70–110)
HCT VFR BLD AUTO: 37.3 % (ref 37–48.5)
HGB BLD-MCNC: 12.4 G/DL (ref 12–16)
IMM GRANULOCYTES # BLD AUTO: 0.03 K/UL (ref 0–0.04)
IMM GRANULOCYTES NFR BLD AUTO: 0.3 % (ref 0–0.5)
LIPASE SERPL-CCNC: 277 U/L (ref 4–60)
LYMPHOCYTES # BLD AUTO: 1.7 K/UL (ref 1–4.8)
LYMPHOCYTES NFR BLD: 19.5 % (ref 18–48)
MCH RBC QN AUTO: 28.5 PG (ref 27–31)
MCHC RBC AUTO-ENTMCNC: 33.2 G/DL (ref 32–36)
MCV RBC AUTO: 86 FL (ref 82–98)
MONOCYTES # BLD AUTO: 0.6 K/UL (ref 0.3–1)
MONOCYTES NFR BLD: 7.3 % (ref 4–15)
NEUTROPHILS # BLD AUTO: 6.1 K/UL (ref 1.8–7.7)
NEUTROPHILS NFR BLD: 69.9 % (ref 38–73)
NRBC BLD-RTO: 0 /100 WBC
PHOSPHATE SERPL-MCNC: 1.8 MG/DL (ref 2.7–4.5)
PLATELET # BLD AUTO: 247 K/UL (ref 150–450)
PMV BLD AUTO: 9.3 FL (ref 9.2–12.9)
POCT GLUCOSE: 126 MG/DL (ref 70–110)
POTASSIUM SERPL-SCNC: 3.4 MMOL/L (ref 3.5–5.1)
RBC # BLD AUTO: 4.35 M/UL (ref 4–5.4)
SODIUM SERPL-SCNC: 138 MMOL/L (ref 136–145)
WBC # BLD AUTO: 8.72 K/UL (ref 3.9–12.7)

## 2021-12-18 PROCEDURE — 25000003 PHARM REV CODE 250: Performed by: INTERNAL MEDICINE

## 2021-12-18 PROCEDURE — 80048 BASIC METABOLIC PNL TOTAL CA: CPT | Performed by: INTERNAL MEDICINE

## 2021-12-18 PROCEDURE — 25000003 PHARM REV CODE 250: Performed by: NURSE PRACTITIONER

## 2021-12-18 PROCEDURE — 83690 ASSAY OF LIPASE: CPT | Performed by: INTERNAL MEDICINE

## 2021-12-18 PROCEDURE — 94760 N-INVAS EAR/PLS OXIMETRY 1: CPT

## 2021-12-18 PROCEDURE — 84100 ASSAY OF PHOSPHORUS: CPT | Performed by: INTERNAL MEDICINE

## 2021-12-18 PROCEDURE — 85025 COMPLETE CBC W/AUTO DIFF WBC: CPT | Performed by: INTERNAL MEDICINE

## 2021-12-18 PROCEDURE — 36415 COLL VENOUS BLD VENIPUNCTURE: CPT | Performed by: INTERNAL MEDICINE

## 2021-12-18 RX ORDER — SODIUM,POTASSIUM PHOSPHATES 280-250MG
2 POWDER IN PACKET (EA) ORAL
Status: DISCONTINUED | OUTPATIENT
Start: 2021-12-18 | End: 2021-12-18 | Stop reason: HOSPADM

## 2021-12-18 RX ORDER — SODIUM,POTASSIUM PHOSPHATES 280-250MG
2 POWDER IN PACKET (EA) ORAL 3 TIMES DAILY
Status: DISCONTINUED | OUTPATIENT
Start: 2021-12-18 | End: 2021-12-18 | Stop reason: HOSPADM

## 2021-12-18 RX ORDER — INSULIN ASPART 100 [IU]/ML
INJECTION, SOLUTION INTRAVENOUS; SUBCUTANEOUS
Qty: 90 ML | Refills: 3 | Status: SHIPPED | OUTPATIENT
Start: 2021-12-18 | End: 2022-01-06

## 2021-12-18 RX ORDER — ONDANSETRON 4 MG/1
8 TABLET, ORALLY DISINTEGRATING ORAL 2 TIMES DAILY
Qty: 20 TABLET | Refills: 1 | Status: SHIPPED | OUTPATIENT
Start: 2021-12-18 | End: 2022-11-14

## 2021-12-18 RX ORDER — LANOLIN ALCOHOL/MO/W.PET/CERES
800 CREAM (GRAM) TOPICAL
Status: DISCONTINUED | OUTPATIENT
Start: 2021-12-18 | End: 2021-12-18 | Stop reason: HOSPADM

## 2021-12-18 RX ORDER — INSULIN ASPART 100 [IU]/ML
6 INJECTION, SOLUTION INTRAVENOUS; SUBCUTANEOUS 3 TIMES DAILY
Qty: 5.4 ML | Refills: 0 | Status: SHIPPED | OUTPATIENT
Start: 2021-12-18 | End: 2022-01-06

## 2021-12-18 RX ADMIN — INSULIN ASPART 6 UNITS: 100 INJECTION, SOLUTION INTRAVENOUS; SUBCUTANEOUS at 08:12

## 2021-12-18 RX ADMIN — POTASSIUM & SODIUM PHOSPHATES POWDER PACK 280-160-250 MG 2 PACKET: 280-160-250 PACK at 05:12

## 2021-12-18 RX ADMIN — FAMOTIDINE 20 MG: 10 INJECTION, SOLUTION INTRAVENOUS at 08:12

## 2021-12-18 RX ADMIN — INSULIN DETEMIR 15 UNITS: 100 INJECTION, SOLUTION SUBCUTANEOUS at 08:12

## 2021-12-18 RX ADMIN — POTASSIUM BICARBONATE 35 MEQ: 391 TABLET, EFFERVESCENT ORAL at 05:12

## 2021-12-18 RX ADMIN — POTASSIUM & SODIUM PHOSPHATES POWDER PACK 280-160-250 MG 2 PACKET: 280-160-250 PACK at 08:12

## 2021-12-18 RX ADMIN — SODIUM CHLORIDE 125 ML/HR: 0.9 INJECTION, SOLUTION INTRAVENOUS at 04:12

## 2021-12-18 RX ADMIN — POTASSIUM BICARBONATE 35 MEQ: 391 TABLET, EFFERVESCENT ORAL at 07:12

## 2021-12-19 ENCOUNTER — NURSE TRIAGE (OUTPATIENT)
Dept: ADMINISTRATIVE | Facility: CLINIC | Age: 31
End: 2021-12-19
Payer: MEDICAID

## 2021-12-20 ENCOUNTER — TELEPHONE (OUTPATIENT)
Dept: MEDSURG UNIT | Facility: HOSPITAL | Age: 31
End: 2021-12-20
Payer: MEDICAID

## 2022-01-06 ENCOUNTER — OFFICE VISIT (OUTPATIENT)
Dept: ENDOCRINOLOGY | Facility: CLINIC | Age: 32
End: 2022-01-06
Payer: MEDICAID

## 2022-01-06 VITALS
WEIGHT: 154.44 LBS | SYSTOLIC BLOOD PRESSURE: 104 MMHG | DIASTOLIC BLOOD PRESSURE: 78 MMHG | TEMPERATURE: 99 F | BODY MASS INDEX: 22.11 KG/M2 | HEART RATE: 95 BPM | OXYGEN SATURATION: 98 % | HEIGHT: 70 IN

## 2022-01-06 DIAGNOSIS — E10.9 TYPE 1 DIABETES MELLITUS WITHOUT COMPLICATION: Primary | ICD-10-CM

## 2022-01-06 DIAGNOSIS — E78.00 ELEVATED LDL CHOLESTEROL LEVEL: ICD-10-CM

## 2022-01-06 PROCEDURE — 1159F MED LIST DOCD IN RCRD: CPT | Mod: CPTII,,, | Performed by: PHYSICIAN ASSISTANT

## 2022-01-06 PROCEDURE — 99214 PR OFFICE/OUTPT VISIT, EST, LEVL IV, 30-39 MIN: ICD-10-PCS | Mod: S$PBB,,, | Performed by: PHYSICIAN ASSISTANT

## 2022-01-06 PROCEDURE — 99999 PR PBB SHADOW E&M-EST. PATIENT-LVL III: ICD-10-PCS | Mod: PBBFAC,,, | Performed by: PHYSICIAN ASSISTANT

## 2022-01-06 PROCEDURE — 3008F BODY MASS INDEX DOCD: CPT | Mod: CPTII,,, | Performed by: PHYSICIAN ASSISTANT

## 2022-01-06 PROCEDURE — 99999 PR PBB SHADOW E&M-EST. PATIENT-LVL III: CPT | Mod: PBBFAC,,, | Performed by: PHYSICIAN ASSISTANT

## 2022-01-06 PROCEDURE — 3008F PR BODY MASS INDEX (BMI) DOCUMENTED: ICD-10-PCS | Mod: CPTII,,, | Performed by: PHYSICIAN ASSISTANT

## 2022-01-06 PROCEDURE — 1160F RVW MEDS BY RX/DR IN RCRD: CPT | Mod: CPTII,,, | Performed by: PHYSICIAN ASSISTANT

## 2022-01-06 PROCEDURE — 1111F DSCHRG MED/CURRENT MED MERGE: CPT | Mod: CPTII,,, | Performed by: PHYSICIAN ASSISTANT

## 2022-01-06 PROCEDURE — 1159F PR MEDICATION LIST DOCUMENTED IN MEDICAL RECORD: ICD-10-PCS | Mod: CPTII,,, | Performed by: PHYSICIAN ASSISTANT

## 2022-01-06 PROCEDURE — 1160F PR REVIEW ALL MEDS BY PRESCRIBER/CLIN PHARMACIST DOCUMENTED: ICD-10-PCS | Mod: CPTII,,, | Performed by: PHYSICIAN ASSISTANT

## 2022-01-06 PROCEDURE — 3078F PR MOST RECENT DIASTOLIC BLOOD PRESSURE < 80 MM HG: ICD-10-PCS | Mod: CPTII,,, | Performed by: PHYSICIAN ASSISTANT

## 2022-01-06 PROCEDURE — 1111F PR DISCHARGE MEDS RECONCILED W/ CURRENT OUTPATIENT MED LIST: ICD-10-PCS | Mod: CPTII,,, | Performed by: PHYSICIAN ASSISTANT

## 2022-01-06 PROCEDURE — 99214 OFFICE O/P EST MOD 30 MIN: CPT | Mod: S$PBB,,, | Performed by: PHYSICIAN ASSISTANT

## 2022-01-06 PROCEDURE — 3074F SYST BP LT 130 MM HG: CPT | Mod: CPTII,,, | Performed by: PHYSICIAN ASSISTANT

## 2022-01-06 PROCEDURE — 3078F DIAST BP <80 MM HG: CPT | Mod: CPTII,,, | Performed by: PHYSICIAN ASSISTANT

## 2022-01-06 PROCEDURE — 3074F PR MOST RECENT SYSTOLIC BLOOD PRESSURE < 130 MM HG: ICD-10-PCS | Mod: CPTII,,, | Performed by: PHYSICIAN ASSISTANT

## 2022-01-06 PROCEDURE — 99213 OFFICE O/P EST LOW 20 MIN: CPT | Mod: PBBFAC,PO | Performed by: PHYSICIAN ASSISTANT

## 2022-01-06 RX ORDER — INSULIN ASPART 100 [IU]/ML
INJECTION, SOLUTION INTRAVENOUS; SUBCUTANEOUS
Qty: 30 ML | Refills: 11 | Status: SHIPPED | OUTPATIENT
Start: 2022-01-06 | End: 2023-07-31 | Stop reason: SDUPTHER

## 2022-01-06 NOTE — PROGRESS NOTES
"CC: This 31 y.o. female presents for management of T1DM  and chronic conditions pending review including HTN, HLP    HPI: was diagnosed with T1DM in 1996 after experiencing polyuria and polydipsia. Recently admitted on  for DKA and switched from Medtronic 530 G to MDI.  Has been hospitalized for DKA multiple times. New to endocrine. She did have seizures in the past after having hypoglycemia. She has been on the pump since she was 15.   Family hx of DM: half sister  Fhx of thyroid disease: parents  Denies missing doses of DM medication.   hypoglycemia at home: none  monitoring BG at home:  Fastin-300  DN: 200    Diet:   BF-fruit, meat, breast  LH-pb sandwich, panini, bf sandwich  DN-peanut stew  Snacks on crackers. Avoids sugary beverages.     Exercise: She runs, walks and does yoga at home 5x weeky. Active at work.    Currrent Medications: Levemir 15 mg bid, ISF: 50, Target 150, CHO: 4    Standards of Care:  Eye exam: 3/20  Podiatry exam: n/a  DE:  While admitted in     PMHx, PSHx: reviewed in epic.  Social Hx: no E/T use. . Currently, going through a divorce. Has one son ~5 yrs old. She is a     ROS:   Gen: Appetite good, no weight gain or loss, denies fatigue and weakness.  Skin: Skin is intact and heals well, no rashes, no hair changes  Eyes: Denies visual disturbances  Resp: no SOB or HCAN, no cough  Cardiac: No palpitations, chest pain, no edema   GI: No nausea or vomiting, diarrhea, constipation, or abdominal pain.  /GYN: No nocturia, burning or pain.   MS/Neuro: Denies numbness/ tingling in BLE; Gait steady, speech clear  Psych: Denies drug/ETOH abuse, no hx of depression.  Other systems: negative.    /78 (BP Location: Left arm, Patient Position: Sitting, BP Method: Small (Manual))   Pulse 95   Temp 98.6 °F (37 °C) (Oral)   Ht 5' 10" (1.778 m)   Wt 70.1 kg (154 lb 6.9 oz)   LMP 2021   SpO2 98%   BMI 22.16 kg/m²      PE:  GENERAL: young female, well developed, " well nourished.  PSYCH: AAOx3, appropriate mood and affect, pleasant expression, conversant, appears relaxed, well groomed.   EYES: Conjunctiva, corneas clear  NECK: Supple, trachea midline,no thyromegaly or nodules  CHEST: Resp even and unlabored, CTA bilateral.  CARDIAC: RRR, S1, S2 heard, no murmurs  ABDOMEN: Soft, non-tender, non-distended.   VASCULAR: DP pulses +2/4 bilaterally, no edema.  NEURO: Gait steady, CN ll-Xll grossly intact   SKIN: Skin warm and dry no acanthosis nigracans.  1/22  Foot Exam: no sores or macerations noted.     Protective Sensation (w/ 10 gram monofilament):  Right: Intact  Left: Intact    Visual Inspection:  Normal -  Bilateral and Nails Intact - without Evidence of Foot Deformity- Bilateral    Pedal Pulses:   Right: Present  Left: Present    Posterior tibialis:   Right:Present  Left: Present     Vibratory Sensation  Right:Positive  Left:Positive     Personally reviewed labs below:    No results displayed because visit has over 200 results.         Lab Results   Component Value Date    HGBA1C 14.0 (H) 11/30/2021         ASSESSMENT and PLAN:    1. Type 1 diabetes mellitus without complication  Lipid Panel    Hemoglobin A1C    Ambulatory referral/consult to Ophthalmology    insulin aspart U-100 (NOVOLOG) 100 unit/mL (3 mL) InPn pen    insulin detemir U-100 (LEVEMIR FLEXTOUCH) 100 unit/mL (3 mL) SubQ InPn pen   2. Elevated LDL cholesterol level       T2DM with hyperglycemia-A1c is elevated. Pt recently switched to MDI. Glucoses are elevated before dinner. Change ISF to 25 and target 100. Continue Levemir 15 u bid and CHO 1:4. Declines CGM. Declines DE. Referral to optho.   Discussed DM, progression of disease, long term complications, tx options.   Discussed A1c and BG goals.   Reviewed  hypoglycemia, s/s and appropriate tx.   Instructed to monitor BG and bring meter/ log to every clinic visit.   - takes ASA, ACEi, statin    Elevated LDL-fhx of CAD. Recheck next visit.     Referral to  optho  Follow-up: in 3 months with lab prior

## 2022-01-17 ENCOUNTER — PATIENT MESSAGE (OUTPATIENT)
Dept: ENDOCRINOLOGY | Facility: CLINIC | Age: 32
End: 2022-01-17
Payer: MEDICAID

## 2022-01-17 DIAGNOSIS — E10.9 TYPE 1 DIABETES MELLITUS WITHOUT COMPLICATION: ICD-10-CM

## 2022-03-17 ENCOUNTER — OFFICE VISIT (OUTPATIENT)
Dept: URGENT CARE | Facility: CLINIC | Age: 32
End: 2022-03-17
Payer: MEDICAID

## 2022-03-17 VITALS
HEIGHT: 70 IN | RESPIRATION RATE: 18 BRPM | SYSTOLIC BLOOD PRESSURE: 124 MMHG | TEMPERATURE: 99 F | BODY MASS INDEX: 21.19 KG/M2 | WEIGHT: 148 LBS | DIASTOLIC BLOOD PRESSURE: 80 MMHG | HEART RATE: 104 BPM | OXYGEN SATURATION: 97 %

## 2022-03-17 DIAGNOSIS — R09.81 NASAL SINUS CONGESTION: Primary | ICD-10-CM

## 2022-03-17 DIAGNOSIS — H66.001 NON-RECURRENT ACUTE SUPPURATIVE OTITIS MEDIA OF RIGHT EAR WITHOUT SPONTANEOUS RUPTURE OF TYMPANIC MEMBRANE: ICD-10-CM

## 2022-03-17 DIAGNOSIS — J01.90 ACUTE NON-RECURRENT SINUSITIS, UNSPECIFIED LOCATION: ICD-10-CM

## 2022-03-17 DIAGNOSIS — Z86.39 HISTORY OF DIABETES MELLITUS, TYPE I: ICD-10-CM

## 2022-03-17 DIAGNOSIS — R05.9 COUGH: ICD-10-CM

## 2022-03-17 PROCEDURE — 1160F RVW MEDS BY RX/DR IN RCRD: CPT | Mod: CPTII,S$GLB,, | Performed by: NURSE PRACTITIONER

## 2022-03-17 PROCEDURE — 1159F PR MEDICATION LIST DOCUMENTED IN MEDICAL RECORD: ICD-10-PCS | Mod: CPTII,S$GLB,, | Performed by: NURSE PRACTITIONER

## 2022-03-17 PROCEDURE — 99213 PR OFFICE/OUTPT VISIT, EST, LEVL III, 20-29 MIN: ICD-10-PCS | Mod: S$GLB,,, | Performed by: NURSE PRACTITIONER

## 2022-03-17 PROCEDURE — 3079F PR MOST RECENT DIASTOLIC BLOOD PRESSURE 80-89 MM HG: ICD-10-PCS | Mod: CPTII,S$GLB,, | Performed by: NURSE PRACTITIONER

## 2022-03-17 PROCEDURE — 3074F SYST BP LT 130 MM HG: CPT | Mod: CPTII,S$GLB,, | Performed by: NURSE PRACTITIONER

## 2022-03-17 PROCEDURE — 3008F PR BODY MASS INDEX (BMI) DOCUMENTED: ICD-10-PCS | Mod: CPTII,S$GLB,, | Performed by: NURSE PRACTITIONER

## 2022-03-17 PROCEDURE — 3074F PR MOST RECENT SYSTOLIC BLOOD PRESSURE < 130 MM HG: ICD-10-PCS | Mod: CPTII,S$GLB,, | Performed by: NURSE PRACTITIONER

## 2022-03-17 PROCEDURE — 99213 OFFICE O/P EST LOW 20 MIN: CPT | Mod: S$GLB,,, | Performed by: NURSE PRACTITIONER

## 2022-03-17 PROCEDURE — 3079F DIAST BP 80-89 MM HG: CPT | Mod: CPTII,S$GLB,, | Performed by: NURSE PRACTITIONER

## 2022-03-17 PROCEDURE — 1159F MED LIST DOCD IN RCRD: CPT | Mod: CPTII,S$GLB,, | Performed by: NURSE PRACTITIONER

## 2022-03-17 PROCEDURE — 3008F BODY MASS INDEX DOCD: CPT | Mod: CPTII,S$GLB,, | Performed by: NURSE PRACTITIONER

## 2022-03-17 PROCEDURE — 1160F PR REVIEW ALL MEDS BY PRESCRIBER/CLIN PHARMACIST DOCUMENTED: ICD-10-PCS | Mod: CPTII,S$GLB,, | Performed by: NURSE PRACTITIONER

## 2022-03-17 RX ORDER — FLUTICASONE PROPIONATE 50 MCG
1 SPRAY, SUSPENSION (ML) NASAL DAILY
Qty: 15.8 ML | Refills: 0 | Status: SHIPPED | OUTPATIENT
Start: 2022-03-17 | End: 2022-11-14

## 2022-03-17 RX ORDER — PROMETHAZINE HYDROCHLORIDE AND DEXTROMETHORPHAN HYDROBROMIDE 6.25; 15 MG/5ML; MG/5ML
5 SYRUP ORAL EVERY 4 HOURS PRN
Qty: 118 ML | Refills: 0 | Status: SHIPPED | OUTPATIENT
Start: 2022-03-17 | End: 2022-03-27

## 2022-03-17 RX ORDER — AZITHROMYCIN 250 MG/1
TABLET, FILM COATED ORAL
Qty: 6 TABLET | Refills: 0 | Status: SHIPPED | OUTPATIENT
Start: 2022-03-17 | End: 2022-11-14

## 2022-03-17 RX ORDER — CETIRIZINE HYDROCHLORIDE 10 MG/1
10 TABLET ORAL DAILY
Qty: 30 TABLET | Refills: 0 | Status: SHIPPED | OUTPATIENT
Start: 2022-03-17 | End: 2022-11-14

## 2022-03-17 RX ORDER — ALBUTEROL SULFATE 90 UG/1
2 AEROSOL, METERED RESPIRATORY (INHALATION) EVERY 6 HOURS PRN
Qty: 18 G | Refills: 0 | Status: SHIPPED | OUTPATIENT
Start: 2022-03-17 | End: 2022-11-14

## 2022-03-17 NOTE — PROGRESS NOTES
"Subjective:       Patient ID: Adore Estes is a 31 y.o. female.    Vitals:  height is 5' 10" (1.778 m) and weight is 67.1 kg (148 lb). Her oral temperature is 99 °F (37.2 °C). Her blood pressure is 124/80 and her pulse is 104. Her respiration is 18 and oxygen saturation is 97%.     Chief Complaint: Sinus Problem    Sinus Problem  This is a new problem. The current episode started 1 to 4 weeks ago. The problem is unchanged. There has been no fever. Associated symptoms include congestion, coughing, ear pain, headaches, a hoarse voice, sinus pressure, sneezing and a sore throat. Pertinent negatives include no chills or shortness of breath. Treatments tried: mucinex, robitussin. The treatment provided no relief.       Constitution: Positive for fatigue. Negative for chills and fever.   HENT: Positive for ear pain, congestion, sinus pressure and sore throat.    Respiratory: Positive for cough. Negative for chest tightness and shortness of breath.    Gastrointestinal: Negative for abdominal pain, nausea, vomiting and diarrhea.   Skin: Negative for rash.   Allergic/Immunologic: Positive for sneezing.   Neurological: Positive for headaches.       Objective:      Physical Exam   Constitutional: She is oriented to person, place, and time. She appears well-developed.  Non-toxic appearance. She does not appear ill. No distress.   HENT:   Head: Normocephalic and atraumatic.   Ears:   Right Ear: Tympanic membrane is injected.   Left Ear: Tympanic membrane normal.   Nose: Rhinorrhea and congestion present.   Mouth/Throat: Mucous membranes are moist. Posterior oropharyngeal erythema present. No oropharyngeal exudate.   Eyes: Conjunctivae and EOM are normal. Right eye exhibits no discharge. Left eye exhibits no discharge.   Neck: Neck supple. No neck rigidity present.   Cardiovascular: Normal rate, regular rhythm and normal heart sounds.   Pulmonary/Chest: Effort normal and breath sounds normal. No respiratory distress. "   Abdominal: Normal appearance.   Musculoskeletal: Normal range of motion.         General: Normal range of motion.      Cervical back: She exhibits no tenderness.   Lymphadenopathy:     She has no cervical adenopathy.   Neurological: no focal deficit. She is alert and oriented to person, place, and time.   Skin: Skin is warm, dry and not diaphoretic. Capillary refill takes 2 to 3 seconds.   Psychiatric: Her behavior is normal. Mood normal.   Nursing note and vitals reviewed.        Assessment:       1. Nasal sinus congestion    2. Cough    3. Acute non-recurrent sinusitis, unspecified location    4. History of diabetes mellitus, type I    5. Non-recurrent acute suppurative otitis media of right ear without spontaneous rupture of tympanic membrane          Plan:         Nasal sinus congestion    Cough  -     albuterol (VENTOLIN HFA) 90 mcg/actuation inhaler; Inhale 2 puffs into the lungs every 6 (six) hours as needed for Wheezing. Rescue  Dispense: 18 g; Refill: 0  -     promethazine-dextromethorphan (PROMETHAZINE-DM) 6.25-15 mg/5 mL Syrp; Take 5 mLs by mouth every 4 (four) hours as needed (cough).  Dispense: 118 mL; Refill: 0    Acute non-recurrent sinusitis, unspecified location  -     fluticasone propionate (FLONASE) 50 mcg/actuation nasal spray; 1 spray (50 mcg total) by Each Nostril route once daily.  Dispense: 15.8 mL; Refill: 0  -     cetirizine (ZYRTEC) 10 MG tablet; Take 1 tablet (10 mg total) by mouth once daily.  Dispense: 30 tablet; Refill: 0    History of diabetes mellitus, type I    Non-recurrent acute suppurative otitis media of right ear without spontaneous rupture of tympanic membrane  -     azithromycin (Z-ANNIE) 250 MG tablet; Take 2 tablets by mouth on day 1; Take 1 tablet by mouth on days 2-5  Dispense: 6 tablet; Refill: 0    Symptomatic treatment to include:    Rest, increase fluid intake to thin mucus, include electrolyte replacement  Ibuprofen/Tylenol as directed for fever, sore throat, body  aches  Zrytec daily  Flonase daily until bottle empty  guaifenesin twice daily to thin mucus  Phenergan cough syrup at night  Nasal saline spray several times a day  or nasal wash systems  Warm, salt water gargles, over the counter throat lozenges or sprays for sore throat.

## 2022-03-17 NOTE — PATIENT INSTRUCTIONS
Symptomatic treatment to include:    Rest, increase fluid intake to thin mucus, include electrolyte replacement  Ibuprofen/Tylenol as directed for fever, sore throat, body aches  Zrytec daily  Flonase daily until bottle empty  guaifenesin twice daily to thin mucus  Phenergan cough syrup at night  Nasal saline spray several times a day  or nasal wash systems  Warm, salt water gargles, over the counter throat lozenges or sprays for sore throat.    No cough syrups are covered by Medicaid.  Print out prescription for Phenergan cough syrup provided.  You can look this up on the good [employer] website and find the best price available to you and hand deliver the prescription

## 2022-11-14 ENCOUNTER — OFFICE VISIT (OUTPATIENT)
Dept: URGENT CARE | Facility: CLINIC | Age: 32
End: 2022-11-14
Payer: MEDICAID

## 2022-11-14 VITALS
DIASTOLIC BLOOD PRESSURE: 69 MMHG | RESPIRATION RATE: 20 BRPM | TEMPERATURE: 99 F | BODY MASS INDEX: 22.45 KG/M2 | HEIGHT: 70 IN | SYSTOLIC BLOOD PRESSURE: 115 MMHG | WEIGHT: 156.81 LBS | OXYGEN SATURATION: 97 % | HEART RATE: 96 BPM

## 2022-11-14 DIAGNOSIS — B95.8 STAPH INFECTION: Primary | ICD-10-CM

## 2022-11-14 PROCEDURE — 3074F SYST BP LT 130 MM HG: CPT | Mod: CPTII,S$GLB,, | Performed by: NURSE PRACTITIONER

## 2022-11-14 PROCEDURE — 99203 OFFICE O/P NEW LOW 30 MIN: CPT | Mod: S$GLB,,, | Performed by: NURSE PRACTITIONER

## 2022-11-14 PROCEDURE — 1159F PR MEDICATION LIST DOCUMENTED IN MEDICAL RECORD: ICD-10-PCS | Mod: CPTII,S$GLB,, | Performed by: NURSE PRACTITIONER

## 2022-11-14 PROCEDURE — 1160F PR REVIEW ALL MEDS BY PRESCRIBER/CLIN PHARMACIST DOCUMENTED: ICD-10-PCS | Mod: CPTII,S$GLB,, | Performed by: NURSE PRACTITIONER

## 2022-11-14 PROCEDURE — 99203 PR OFFICE/OUTPT VISIT, NEW, LEVL III, 30-44 MIN: ICD-10-PCS | Mod: S$GLB,,, | Performed by: NURSE PRACTITIONER

## 2022-11-14 PROCEDURE — 3008F BODY MASS INDEX DOCD: CPT | Mod: CPTII,S$GLB,, | Performed by: NURSE PRACTITIONER

## 2022-11-14 PROCEDURE — 1159F MED LIST DOCD IN RCRD: CPT | Mod: CPTII,S$GLB,, | Performed by: NURSE PRACTITIONER

## 2022-11-14 PROCEDURE — 1160F RVW MEDS BY RX/DR IN RCRD: CPT | Mod: CPTII,S$GLB,, | Performed by: NURSE PRACTITIONER

## 2022-11-14 PROCEDURE — 3008F PR BODY MASS INDEX (BMI) DOCUMENTED: ICD-10-PCS | Mod: CPTII,S$GLB,, | Performed by: NURSE PRACTITIONER

## 2022-11-14 PROCEDURE — 3078F DIAST BP <80 MM HG: CPT | Mod: CPTII,S$GLB,, | Performed by: NURSE PRACTITIONER

## 2022-11-14 PROCEDURE — 3078F PR MOST RECENT DIASTOLIC BLOOD PRESSURE < 80 MM HG: ICD-10-PCS | Mod: CPTII,S$GLB,, | Performed by: NURSE PRACTITIONER

## 2022-11-14 PROCEDURE — 3074F PR MOST RECENT SYSTOLIC BLOOD PRESSURE < 130 MM HG: ICD-10-PCS | Mod: CPTII,S$GLB,, | Performed by: NURSE PRACTITIONER

## 2022-11-14 RX ORDER — MUPIROCIN 20 MG/G
OINTMENT TOPICAL 3 TIMES DAILY
Qty: 30 G | Refills: 0 | OUTPATIENT
Start: 2022-11-14 | End: 2022-11-16

## 2022-11-14 RX ORDER — SULFAMETHOXAZOLE AND TRIMETHOPRIM 800; 160 MG/1; MG/1
1 TABLET ORAL 2 TIMES DAILY
Qty: 14 TABLET | Refills: 0 | OUTPATIENT
Start: 2022-11-14 | End: 2022-11-16

## 2022-11-14 NOTE — PATIENT INSTRUCTIONS
Mupirocin ointment to scalp lesion 3 times daily  Bactrim DS Take 1 tablet twice daily with food x 7 days  Keep site clean and dry  Follow up with your PCP or in this clinic if the site does not improve after 48 hours of antibiotics

## 2022-11-14 NOTE — PROGRESS NOTES
"Subjective:       Patient ID: Adore Estes is a 31 y.o. female.    Vitals:  height is 5' 10" (1.778 m) and weight is 71.1 kg (156 lb 12.8 oz). Her temperature is 99.4 °F (37.4 °C). Her blood pressure is 115/69 and her pulse is 96. Her respiration is 20 and oxygen saturation is 97%.     Chief Complaint: Hair/Scalp Problem    31 year old female with c/o hit her head causing a wound to her scalp last week at work. The site has become red and tender. She has washed with clarifying shampoo and it has not helped.      HENT:          Scalp lesion     Objective:      Physical Exam   Constitutional: She is oriented to person, place, and time.   HENT:   Head:       Nose: Nose normal.   Mouth/Throat: Mucous membranes are moist.   Eyes: Conjunctivae are normal. Extraocular movement intact   Neck: Neck supple.   Cardiovascular: Normal rate, regular rhythm, normal heart sounds and normal pulses.   Pulmonary/Chest: Effort normal.   Abdominal: Normal appearance. Soft.   Musculoskeletal: Normal range of motion.         General: Normal range of motion.   Neurological: She is alert and oriented to person, place, and time.   Skin: Skin is warm and dry. Capillary refill takes 2 to 3 seconds.   Psychiatric: Her behavior is normal. Mood normal.   Nursing note and vitals reviewed.      Assessment:       1. Staph infection          Plan:       1 cm lesion to mid scalp, non-fluctuant. Redness and edema at the site. Will treat with Bactrim and Bactroban.  Staph infection  -     sulfamethoxazole-trimethoprim 800-160mg (BACTRIM DS) 800-160 mg Tab; Take 1 tablet by mouth 2 (two) times daily. for 7 days  Dispense: 14 tablet; Refill: 0  -     mupirocin (BACTROBAN) 2 % ointment; Apply topically 3 (three) times daily.  Dispense: 30 g; Refill: 0       Mupirocin ointment to scalp lesion 3 times daily  Bactrim DS Take 1 tablet twice daily with food x 7 days  Keep site clean and dry  Follow up with your PCP or in this clinic if the site does not " improve after 48 hours of antibiotics

## 2022-11-16 ENCOUNTER — HOSPITAL ENCOUNTER (EMERGENCY)
Facility: HOSPITAL | Age: 32
Discharge: HOME | End: 2022-11-16
Attending: EMERGENCY MEDICINE
Payer: MEDICAID

## 2022-11-16 VITALS
DIASTOLIC BLOOD PRESSURE: 88 MMHG | OXYGEN SATURATION: 98 % | HEART RATE: 97 BPM | SYSTOLIC BLOOD PRESSURE: 134 MMHG | TEMPERATURE: 98 F | RESPIRATION RATE: 19 BRPM | BODY MASS INDEX: 22.33 KG/M2 | WEIGHT: 156 LBS | HEIGHT: 70 IN

## 2022-11-16 DIAGNOSIS — L03.211 FACIAL CELLULITIS: Primary | ICD-10-CM

## 2022-11-16 PROCEDURE — 99283 EMERGENCY DEPT VISIT LOW MDM: CPT

## 2022-11-16 RX ORDER — DOXYCYCLINE 100 MG/1
100 CAPSULE ORAL 2 TIMES DAILY
Qty: 20 CAPSULE | Refills: 0 | Status: ON HOLD | OUTPATIENT
Start: 2022-11-16 | End: 2022-11-22 | Stop reason: HOSPADM

## 2022-11-16 RX ORDER — DOXYCYCLINE HYCLATE 100 MG
100 TABLET ORAL
Status: DISCONTINUED | OUTPATIENT
Start: 2022-11-16 | End: 2022-11-16 | Stop reason: HOSPADM

## 2022-11-16 NOTE — ED PROVIDER NOTES
"Encounter Date: 11/16/2022    SCRIBE #1 NOTE: Lexus MELGAR, nguyen scribing for, and in the presence of,  Sanjay Lees MD.     History     Chief Complaint   Patient presents with    Facial Swelling     Patient states that she was placed on bactrim and a topical cream on Monday and woke up this am with swelling to the right side of her face and states it is painful        Time seen by provider: 8:27 AM on 11/16/2022    Adore Estes is a 31 y.o. female with a PMHx of DM I (insulin pump in place) and seizures who presents to the ED for evaluation of R-sided facial swelling. Patient reports hitting the crown of her head with the freezer door about about 1 week ago.  Since the injury the patient reports pain to the crown of her head that radiates to her R ear, R jaw and R neck with associated swelling.  She notes a wound over the high parietal region of the scalp secondary to the impact of the freezer door.  She states an accompanying "draining" sensation near the wound as well as forehead swelling that began the day after the injury. But no actual drainage.  Patient was evaluated by urgent care 2 days ago and diagnosed with a staph infection.  At this time she was started on Bactrim 160 mg BID and Mupirocin 2% TID with Sx persisting.  Patient awoke this morning with diffuse swelling to the R face that is exacerbated over the periorbital region.  She notes a mild accompanying HA, described as a "pressure-like" sensation.  The patient denies a fever, eye pain or any other symptoms at this time.  She confirms she is not pregnant or breastfeeding currently.  Allergies to Amoxicillin discussed.  Personal Hx of frequent staph infections, per patient.  No pertinent PSHx.  No fevers no chills.  She does not feel sick.    The history is provided by the patient.   Review of patient's allergies indicates:   Allergen Reactions    Cerebyx [fosphenytoin] Itching    Amoxil [amoxicillin] Hives     Past Medical History: "   Diagnosis Date    Diabetes mellitus     Diabetes mellitus type I     Diabetes mellitus, type 2     Insulin pump in place     Seizures     last seizure 2013      Past Surgical History:   Procedure Laterality Date    APPENDECTOMY  2017     SECTION       Family History   Problem Relation Age of Onset    Breast cancer Maternal Aunt     Asthma Sister     Colon cancer Neg Hx     Ovarian cancer Neg Hx      Social History     Tobacco Use    Smoking status: Never    Smokeless tobacco: Never   Substance Use Topics    Alcohol use: Yes     Alcohol/week: 3.0 standard drinks     Types: 1 Glasses of wine, 1 Cans of beer, 1 Shots of liquor per week     Comment: 2 drink per week     Drug use: No     Review of Systems   Constitutional:  Negative for chills and fever.   HENT:  Positive for ear pain (R) and facial swelling (R side, periorbital, and forehead).         Positive for R jaw pain.   Eyes:  Negative for pain.   Gastrointestinal:  Negative for nausea and vomiting.   Musculoskeletal:  Positive for neck pain (R).   Skin:  Positive for wound (high parietal scalp).   Neurological:  Positive for headaches (mild).   Hematological:  Negative for adenopathy.     Physical Exam     Initial Vitals [22 0800]   BP Pulse Resp Temp SpO2   115/73 100 19 98.3 °F (36.8 °C) 99 %      MAP       --         Physical Exam    Nursing note and vitals reviewed.  Constitutional: She appears well-developed and well-nourished.   HENT:   Head: Normocephalic.   Minimal R forehead swelling.   Eyes: EOM are normal.   Minimal R periorbital swelling.    Neck: Neck supple.   Normal range of motion.  Cardiovascular:  Normal rate, regular rhythm and normal heart sounds.     Exam reveals no gallop and no friction rub.       No murmur heard.  Pulmonary/Chest: Breath sounds normal. No respiratory distress. She has no wheezes. She has no rhonchi. She has no rales.   Musculoskeletal:         General: Normal range of motion.      Cervical back: Normal  range of motion and neck supple.     Neurological: She is alert and oriented to person, place, and time.   Skin: Skin is warm and dry. No abscess noted.   Healing wound to the high parietal scalp that is minimally tender and without presence of an abscess.     Psychiatric: She has a normal mood and affect. Her behavior is normal. Judgment and thought content normal.       ED Course   Procedures  Labs Reviewed - No data to display       Imaging Results    None          Medications   doxycycline tablet 100 mg (100 mg Oral Not Given 11/16/22 0845)     Medical Decision Making:   History:   Old Medical Records: I decided to obtain old medical records.  Initial Assessment:   Recent urgent care note reviewed        Scribe Attestation:   Scribe #1: I performed the above scribed service and the documentation accurately describes the services I performed. I attest to the accuracy of the note.      ED Course as of 11/16/22 1024   Wed Nov 16, 2022   0805 BP: 115/73 [EF]   0805 Temp: 98.3 °F (36.8 °C) [EF]   0805 Temp src: Oral [EF]   0805 Pulse: 100 [EF]   0805 Resp: 19 [EF]   0805 SpO2: 99 % [EF]      ED Course User Index  [EF] Sanjay Lees MD               I, Dr. Lees, personally performed the services described in this documentation. All medical record entries made by the scribe were at my direction and in my presence.  I have reviewed the chart and agree that the record reflects my personal performance and is accurate and complete.10:25 AM 11/16/2022      Clinical Impression:   Final diagnoses:  [L03.211] Facial cellulitis (Primary)      ED Disposition Condition    Discharge Stable          ED Prescriptions       Medication Sig Dispense Start Date End Date Auth. Provider    doxycycline (VIBRAMYCIN) 100 MG Cap Take 1 capsule (100 mg total) by mouth 2 (two) times daily. for 10 days 20 capsule 11/16/2022 11/26/2022 Sanjay Lees MD          Follow-up Information       Follow up With Specialties Details Why Contact  Info    [facility] Emergency Medicine  As needed, If symptoms worsen [address] [place] [id number] [phone]            31-year-old female presents with a healing wound the high right parietal region.  She has been on Bactrim and mupirocin has now developed some right periorbital and forehead swelling.  I think this is probably developing cellulitis as she has a history of this and is immune suppressed secondary to type 1 diabetes.  She has no systemic complaints and there is not yet any erythema or calor.  She will be switched to doxycycline.  I have advised her to return to the ER for symptoms worsen.  No evidence this time of preseptal cellulitis.  She has no pain with extraocular movements.     Sanjay Lees MD  11/16/22 0327

## 2022-11-18 ENCOUNTER — HOSPITAL ENCOUNTER (INPATIENT)
Facility: HOSPITAL | Age: 32
LOS: 4 days | Discharge: HOME | DRG: 638 | End: 2022-11-22
Attending: EMERGENCY MEDICINE | Admitting: HOSPITALIST
Payer: MEDICAID

## 2022-11-18 DIAGNOSIS — E10.10 DIABETIC KETOACIDOSIS WITHOUT COMA ASSOCIATED WITH TYPE 1 DIABETES MELLITUS: Primary | ICD-10-CM

## 2022-11-18 PROBLEM — E87.29 HIGH ANION GAP METABOLIC ACIDOSIS: Status: ACTIVE | Noted: 2022-11-18

## 2022-11-18 PROBLEM — L03.211 FACIAL CELLULITIS: Status: ACTIVE | Noted: 2022-11-18

## 2022-11-18 LAB
ALBUMIN SERPL BCP-MCNC: 3.8 G/DL (ref 3.5–5.2)
ALLENS TEST: ABNORMAL
ALP SERPL-CCNC: 151 U/L (ref 55–135)
ALT SERPL W/O P-5'-P-CCNC: 9 U/L (ref 10–44)
ANION GAP SERPL CALC-SCNC: 12 MMOL/L (ref 8–16)
ANION GAP SERPL CALC-SCNC: 14 MMOL/L (ref 8–16)
ANION GAP SERPL CALC-SCNC: 14 MMOL/L (ref 8–16)
ANION GAP SERPL CALC-SCNC: 23 MMOL/L (ref 8–16)
AST SERPL-CCNC: 8 U/L (ref 10–40)
B-OH-BUTYR BLD STRIP-SCNC: 5.3 MMOL/L (ref 0–0.5)
BACTERIA #/AREA URNS HPF: NORMAL /HPF
BASOPHILS # BLD AUTO: 0.06 K/UL (ref 0–0.2)
BASOPHILS NFR BLD: 0.4 % (ref 0–1.9)
BILIRUB SERPL-MCNC: 0.3 MG/DL (ref 0.1–1)
BILIRUB UR QL STRIP: NEGATIVE
BUN SERPL-MCNC: 5 MG/DL (ref 6–20)
BUN SERPL-MCNC: 6 MG/DL (ref 6–20)
BUN SERPL-MCNC: 7 MG/DL (ref 6–20)
BUN SERPL-MCNC: 7 MG/DL (ref 6–20)
C PEPTIDE SERPL-MCNC: <0.08 NG/ML (ref 0.78–5.19)
CALCIUM SERPL-MCNC: 8.3 MG/DL (ref 8.7–10.5)
CALCIUM SERPL-MCNC: 8.5 MG/DL (ref 8.7–10.5)
CALCIUM SERPL-MCNC: 9.2 MG/DL (ref 8.7–10.5)
CALCIUM SERPL-MCNC: 9.8 MG/DL (ref 8.7–10.5)
CHLORIDE SERPL-SCNC: 103 MMOL/L (ref 95–110)
CHLORIDE SERPL-SCNC: 106 MMOL/L (ref 95–110)
CHLORIDE SERPL-SCNC: 109 MMOL/L (ref 95–110)
CHLORIDE SERPL-SCNC: 109 MMOL/L (ref 95–110)
CLARITY UR: CLEAR
CO2 SERPL-SCNC: 10 MMOL/L (ref 23–29)
CO2 SERPL-SCNC: 12 MMOL/L (ref 23–29)
CO2 SERPL-SCNC: 12 MMOL/L (ref 23–29)
CO2 SERPL-SCNC: 8 MMOL/L (ref 23–29)
COLOR UR: YELLOW
CREAT SERPL-MCNC: 0.7 MG/DL (ref 0.5–1.4)
CREAT SERPL-MCNC: 0.8 MG/DL (ref 0.5–1.4)
CREAT SERPL-MCNC: 0.9 MG/DL (ref 0.5–1.4)
CREAT SERPL-MCNC: 1.1 MG/DL (ref 0.5–1.4)
CRP SERPL-MCNC: 85.4 MG/L (ref 0–8.2)
DIFFERENTIAL METHOD: ABNORMAL
EOSINOPHIL # BLD AUTO: 0 K/UL (ref 0–0.5)
EOSINOPHIL NFR BLD: 0.2 % (ref 0–8)
ERYTHROCYTE [DISTWIDTH] IN BLOOD BY AUTOMATED COUNT: 11.7 % (ref 11.5–14.5)
ERYTHROCYTE [SEDIMENTATION RATE] IN BLOOD BY WESTERGREN METHOD: 43 MM/HR (ref 0–20)
EST. GFR  (NO RACE VARIABLE): >60 ML/MIN/1.73 M^2
GLUCOSE SERPL-MCNC: 198 MG/DL (ref 70–110)
GLUCOSE SERPL-MCNC: 214 MG/DL (ref 70–110)
GLUCOSE SERPL-MCNC: 253 MG/DL (ref 70–110)
GLUCOSE SERPL-MCNC: 348 MG/DL (ref 70–110)
GLUCOSE UR QL STRIP: ABNORMAL
HCG SERPL QL: NEGATIVE
HCO3 UR-SCNC: 9.4 MMOL/L (ref 24–28)
HCT VFR BLD AUTO: 48.3 % (ref 37–48.5)
HGB BLD-MCNC: 15.7 G/DL (ref 12–16)
HGB UR QL STRIP: ABNORMAL
IMM GRANULOCYTES # BLD AUTO: 0.12 K/UL (ref 0–0.04)
IMM GRANULOCYTES NFR BLD AUTO: 0.9 % (ref 0–0.5)
KETONES UR QL STRIP: ABNORMAL
LACTATE SERPL-SCNC: 0.7 MMOL/L (ref 0.5–2.2)
LEUKOCYTE ESTERASE UR QL STRIP: NEGATIVE
LIPASE SERPL-CCNC: 8 U/L (ref 4–60)
LYMPHOCYTES # BLD AUTO: 1.5 K/UL (ref 1–4.8)
LYMPHOCYTES NFR BLD: 10.6 % (ref 18–48)
MCH RBC QN AUTO: 29.3 PG (ref 27–31)
MCHC RBC AUTO-ENTMCNC: 32.5 G/DL (ref 32–36)
MCV RBC AUTO: 90 FL (ref 82–98)
MICROSCOPIC COMMENT: NORMAL
MONOCYTES # BLD AUTO: 0.7 K/UL (ref 0.3–1)
MONOCYTES NFR BLD: 4.8 % (ref 4–15)
NEUTROPHILS # BLD AUTO: 11.5 K/UL (ref 1.8–7.7)
NEUTROPHILS NFR BLD: 83.1 % (ref 38–73)
NITRITE UR QL STRIP: NEGATIVE
NRBC BLD-RTO: 0 /100 WBC
PCO2 BLDA: 24.4 MMHG (ref 35–45)
PH SMN: 7.19 [PH] (ref 7.35–7.45)
PH UR STRIP: 6 [PH] (ref 5–8)
PLATELET # BLD AUTO: 377 K/UL (ref 150–450)
PMV BLD AUTO: 9.2 FL (ref 9.2–12.9)
PO2 BLDA: 49 MMHG (ref 40–60)
POC BE: -19 MMOL/L
POC SATURATED O2: 76 % (ref 95–100)
POC TCO2: 10 MMOL/L (ref 24–29)
POCT GLUCOSE: 171 MG/DL (ref 70–110)
POCT GLUCOSE: 174 MG/DL (ref 70–110)
POCT GLUCOSE: 191 MG/DL (ref 70–110)
POCT GLUCOSE: 197 MG/DL (ref 70–110)
POCT GLUCOSE: 204 MG/DL (ref 70–110)
POCT GLUCOSE: 207 MG/DL (ref 70–110)
POCT GLUCOSE: 209 MG/DL (ref 70–110)
POCT GLUCOSE: 221 MG/DL (ref 70–110)
POCT GLUCOSE: 229 MG/DL (ref 70–110)
POCT GLUCOSE: 293 MG/DL (ref 70–110)
POCT GLUCOSE: 322 MG/DL (ref 70–110)
POTASSIUM SERPL-SCNC: 3.6 MMOL/L (ref 3.5–5.1)
POTASSIUM SERPL-SCNC: 3.7 MMOL/L (ref 3.5–5.1)
PROT SERPL-MCNC: 8.8 G/DL (ref 6–8.4)
PROT UR QL STRIP: ABNORMAL
RBC # BLD AUTO: 5.36 M/UL (ref 4–5.4)
RBC #/AREA URNS HPF: 0 /HPF (ref 0–4)
SAMPLE: ABNORMAL
SITE: ABNORMAL
SODIUM SERPL-SCNC: 132 MMOL/L (ref 136–145)
SODIUM SERPL-SCNC: 133 MMOL/L (ref 136–145)
SODIUM SERPL-SCNC: 133 MMOL/L (ref 136–145)
SODIUM SERPL-SCNC: 134 MMOL/L (ref 136–145)
SP GR UR STRIP: >=1.03 (ref 1–1.03)
TSH SERPL DL<=0.005 MIU/L-ACNC: 0.7 UIU/ML (ref 0.4–4)
URN SPEC COLLECT METH UR: ABNORMAL
UROBILINOGEN UR STRIP-ACNC: NEGATIVE EU/DL
WBC # BLD AUTO: 13.82 K/UL (ref 3.9–12.7)
YEAST URNS QL MICRO: NORMAL

## 2022-11-18 PROCEDURE — 87040 BLOOD CULTURE FOR BACTERIA: CPT | Performed by: HOSPITALIST

## 2022-11-18 PROCEDURE — G0378 HOSPITAL OBSERVATION PER HR: HCPCS

## 2022-11-18 PROCEDURE — 84681 ASSAY OF C-PEPTIDE: CPT | Performed by: HOSPITALIST

## 2022-11-18 PROCEDURE — 36415 COLL VENOUS BLD VENIPUNCTURE: CPT | Performed by: HOSPITALIST

## 2022-11-18 PROCEDURE — 25000003 PHARM REV CODE 250: Performed by: HOSPITALIST

## 2022-11-18 PROCEDURE — 83036 HEMOGLOBIN GLYCOSYLATED A1C: CPT | Performed by: HOSPITALIST

## 2022-11-18 PROCEDURE — 80053 COMPREHEN METABOLIC PANEL: CPT | Performed by: EMERGENCY MEDICINE

## 2022-11-18 PROCEDURE — 82962 GLUCOSE BLOOD TEST: CPT

## 2022-11-18 PROCEDURE — 86140 C-REACTIVE PROTEIN: CPT | Performed by: EMERGENCY MEDICINE

## 2022-11-18 PROCEDURE — 36415 COLL VENOUS BLD VENIPUNCTURE: CPT | Performed by: EMERGENCY MEDICINE

## 2022-11-18 PROCEDURE — 94761 N-INVAS EAR/PLS OXIMETRY MLT: CPT

## 2022-11-18 PROCEDURE — 84703 CHORIONIC GONADOTROPIN ASSAY: CPT | Performed by: HOSPITALIST

## 2022-11-18 PROCEDURE — 83605 ASSAY OF LACTIC ACID: CPT | Performed by: HOSPITALIST

## 2022-11-18 PROCEDURE — 80048 BASIC METABOLIC PNL TOTAL CA: CPT | Mod: XB | Performed by: HOSPITALIST

## 2022-11-18 PROCEDURE — 63600175 PHARM REV CODE 636 W HCPCS: Performed by: HOSPITALIST

## 2022-11-18 PROCEDURE — 83690 ASSAY OF LIPASE: CPT | Performed by: HOSPITALIST

## 2022-11-18 PROCEDURE — 87081 CULTURE SCREEN ONLY: CPT | Performed by: NURSE PRACTITIONER

## 2022-11-18 PROCEDURE — 99900035 HC TECH TIME PER 15 MIN (STAT)

## 2022-11-18 PROCEDURE — 82010 KETONE BODYS QUAN: CPT | Performed by: EMERGENCY MEDICINE

## 2022-11-18 PROCEDURE — S5010 5% DEXTROSE AND 0.45% SALINE: HCPCS | Performed by: NURSE PRACTITIONER

## 2022-11-18 PROCEDURE — 12000002 HC ACUTE/MED SURGE SEMI-PRIVATE ROOM

## 2022-11-18 PROCEDURE — 81000 URINALYSIS NONAUTO W/SCOPE: CPT | Performed by: EMERGENCY MEDICINE

## 2022-11-18 PROCEDURE — 84443 ASSAY THYROID STIM HORMONE: CPT | Performed by: HOSPITALIST

## 2022-11-18 PROCEDURE — 96365 THER/PROPH/DIAG IV INF INIT: CPT

## 2022-11-18 PROCEDURE — 85025 COMPLETE CBC W/AUTO DIFF WBC: CPT | Performed by: EMERGENCY MEDICINE

## 2022-11-18 PROCEDURE — 96372 THER/PROPH/DIAG INJ SC/IM: CPT | Performed by: HOSPITALIST

## 2022-11-18 PROCEDURE — 85651 RBC SED RATE NONAUTOMATED: CPT | Performed by: EMERGENCY MEDICINE

## 2022-11-18 PROCEDURE — S5010 5% DEXTROSE AND 0.45% SALINE: HCPCS | Performed by: HOSPITALIST

## 2022-11-18 PROCEDURE — 63600175 PHARM REV CODE 636 W HCPCS: Performed by: EMERGENCY MEDICINE

## 2022-11-18 PROCEDURE — 96375 TX/PRO/DX INJ NEW DRUG ADDON: CPT

## 2022-11-18 PROCEDURE — 25000003 PHARM REV CODE 250: Performed by: NURSE PRACTITIONER

## 2022-11-18 PROCEDURE — 25000003 PHARM REV CODE 250: Performed by: EMERGENCY MEDICINE

## 2022-11-18 PROCEDURE — 99291 CRITICAL CARE FIRST HOUR: CPT | Mod: 25

## 2022-11-18 RX ORDER — ENOXAPARIN SODIUM 100 MG/ML
40 INJECTION SUBCUTANEOUS EVERY 24 HOURS
Status: DISCONTINUED | OUTPATIENT
Start: 2022-11-18 | End: 2022-11-22 | Stop reason: HOSPADM

## 2022-11-18 RX ORDER — POLYETHYLENE GLYCOL 3350 17 G/17G
17 POWDER, FOR SOLUTION ORAL DAILY
Status: DISCONTINUED | OUTPATIENT
Start: 2022-11-19 | End: 2022-11-22 | Stop reason: HOSPADM

## 2022-11-18 RX ORDER — TALC
6 POWDER (GRAM) TOPICAL NIGHTLY PRN
Status: DISCONTINUED | OUTPATIENT
Start: 2022-11-18 | End: 2022-11-22 | Stop reason: HOSPADM

## 2022-11-18 RX ORDER — FAMOTIDINE 10 MG/ML
20 INJECTION INTRAVENOUS EVERY 12 HOURS
Status: DISCONTINUED | OUTPATIENT
Start: 2022-11-18 | End: 2022-11-22 | Stop reason: HOSPADM

## 2022-11-18 RX ORDER — AMOXICILLIN 250 MG
1 CAPSULE ORAL 2 TIMES DAILY
Status: DISCONTINUED | OUTPATIENT
Start: 2022-11-18 | End: 2022-11-22 | Stop reason: HOSPADM

## 2022-11-18 RX ORDER — DEXTROSE MONOHYDRATE AND SODIUM CHLORIDE 5; .45 G/100ML; G/100ML
125 INJECTION, SOLUTION INTRAVENOUS CONTINUOUS PRN
Status: DISCONTINUED | OUTPATIENT
Start: 2022-11-18 | End: 2022-11-18

## 2022-11-18 RX ORDER — ACETAMINOPHEN 325 MG/1
650 TABLET ORAL EVERY 4 HOURS PRN
Status: DISCONTINUED | OUTPATIENT
Start: 2022-11-18 | End: 2022-11-22 | Stop reason: HOSPADM

## 2022-11-18 RX ORDER — POTASSIUM CHLORIDE 7.45 MG/ML
40 INJECTION INTRAVENOUS
Status: DISCONTINUED | OUTPATIENT
Start: 2022-11-18 | End: 2022-11-22 | Stop reason: HOSPADM

## 2022-11-18 RX ORDER — POTASSIUM CHLORIDE 7.45 MG/ML
60 INJECTION INTRAVENOUS
Status: DISCONTINUED | OUTPATIENT
Start: 2022-11-18 | End: 2022-11-22 | Stop reason: HOSPADM

## 2022-11-18 RX ORDER — ONDANSETRON 2 MG/ML
4 INJECTION INTRAMUSCULAR; INTRAVENOUS
Status: COMPLETED | OUTPATIENT
Start: 2022-11-18 | End: 2022-11-18

## 2022-11-18 RX ORDER — SODIUM CHLORIDE 9 MG/ML
125 INJECTION, SOLUTION INTRAVENOUS CONTINUOUS
Status: DISCONTINUED | OUTPATIENT
Start: 2022-11-18 | End: 2022-11-22

## 2022-11-18 RX ORDER — PROCHLORPERAZINE EDISYLATE 5 MG/ML
5 INJECTION INTRAMUSCULAR; INTRAVENOUS EVERY 6 HOURS PRN
Status: DISCONTINUED | OUTPATIENT
Start: 2022-11-18 | End: 2022-11-22 | Stop reason: HOSPADM

## 2022-11-18 RX ORDER — ONDANSETRON 2 MG/ML
4 INJECTION INTRAMUSCULAR; INTRAVENOUS EVERY 6 HOURS PRN
Status: DISCONTINUED | OUTPATIENT
Start: 2022-11-18 | End: 2022-11-22 | Stop reason: HOSPADM

## 2022-11-18 RX ORDER — DEXTROSE MONOHYDRATE AND SODIUM CHLORIDE 5; .45 G/100ML; G/100ML
INJECTION, SOLUTION INTRAVENOUS CONTINUOUS
Status: DISCONTINUED | OUTPATIENT
Start: 2022-11-18 | End: 2022-11-22

## 2022-11-18 RX ORDER — SODIUM CHLORIDE 0.9 % (FLUSH) 0.9 %
10 SYRINGE (ML) INJECTION
Status: DISCONTINUED | OUTPATIENT
Start: 2022-11-18 | End: 2022-11-22 | Stop reason: HOSPADM

## 2022-11-18 RX ORDER — MORPHINE SULFATE 2 MG/ML
2 INJECTION, SOLUTION INTRAMUSCULAR; INTRAVENOUS EVERY 4 HOURS PRN
Status: DISCONTINUED | OUTPATIENT
Start: 2022-11-18 | End: 2022-11-22 | Stop reason: HOSPADM

## 2022-11-18 RX ADMIN — VANCOMYCIN HYDROCHLORIDE 1250 MG: 1.25 INJECTION, POWDER, LYOPHILIZED, FOR SOLUTION INTRAVENOUS at 05:11

## 2022-11-18 RX ADMIN — FAMOTIDINE 20 MG: 10 INJECTION, SOLUTION INTRAVENOUS at 08:11

## 2022-11-18 RX ADMIN — SODIUM CHLORIDE 6 UNITS/HR: 9 INJECTION, SOLUTION INTRAVENOUS at 02:11

## 2022-11-18 RX ADMIN — ENOXAPARIN SODIUM 40 MG: 40 INJECTION SUBCUTANEOUS at 04:11

## 2022-11-18 RX ADMIN — SODIUM CHLORIDE 2.5 UNITS/HR: 9 INJECTION, SOLUTION INTRAVENOUS at 03:11

## 2022-11-18 RX ADMIN — POTASSIUM CHLORIDE 40 MEQ: 7.46 INJECTION, SOLUTION INTRAVENOUS at 08:11

## 2022-11-18 RX ADMIN — ONDANSETRON 4 MG: 2 INJECTION INTRAMUSCULAR; INTRAVENOUS at 02:11

## 2022-11-18 RX ADMIN — DEXTROSE AND SODIUM CHLORIDE: 5; .45 INJECTION, SOLUTION INTRAVENOUS at 08:11

## 2022-11-18 RX ADMIN — ACETAMINOPHEN 650 MG: 325 TABLET ORAL at 11:11

## 2022-11-18 RX ADMIN — SODIUM CHLORIDE 1000 ML: 0.9 INJECTION, SOLUTION INTRAVENOUS at 02:11

## 2022-11-18 RX ADMIN — DEXTROSE AND SODIUM CHLORIDE 125 ML/HR: 5; .45 INJECTION, SOLUTION INTRAVENOUS at 04:11

## 2022-11-18 NOTE — HPI
31 year female presented to ED with nausea and face swelling and pain around both eyes. She started with cellulitis in scalp that progressed to forehead. She did not tolerate bactrim and came to ED for an evaluation as the cellulitis progressed with more swelling. She was switched to doxycyline a few days ago and cellulitis still did not improve. With worsening infection, she became nauseated and now in DKA. On workup: WBC 12,000 with a left shift, ESR 43, CRP 85, Beta-hydroxy 5.3, serum CO2 8 Glucose 348, AG 23.  She was admitted on the DKA pathway. Given NS IVF and insulin drip. NPO.    MRSA screen pending. 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 Vancomycin IV started. Blood cultures pending.

## 2022-11-18 NOTE — SUBJECTIVE & OBJECTIVE
Past Medical History:   Diagnosis Date    Diabetes mellitus     Diabetes mellitus type I     Diabetes mellitus, type 2     Insulin pump in place     Seizures     last seizure         Past Surgical History:   Procedure Laterality Date    APPENDECTOMY  2017     SECTION         Review of patient's allergies indicates:   Allergen Reactions    Cerebyx [fosphenytoin] Itching    Amoxil [amoxicillin] Hives       No current facility-administered medications on file prior to encounter.     Current Outpatient Medications on File Prior to Encounter   Medication Sig    blood sugar diagnostic Strp Check 5x daily for hypoglycemia.  True Metrix    doxycycline (VIBRAMYCIN) 100 MG Cap Take 1 capsule (100 mg total) by mouth 2 (two) times daily. for 10 days    insulin aspart U-100 (NOVOLOG) 100 unit/mL (3 mL) InPn pen To use with carb ratio 1:4, ISF: 25 MDD: 80 units    insulin detemir U-100 (LEVEMIR FLEXTOUCH) 100 unit/mL (3 mL) SubQ InPn pen Inject 15 Units into the skin 2 (two) times daily. MDD: 30 units     Family History       Problem Relation (Age of Onset)    Asthma Sister    Breast cancer Maternal Aunt          Tobacco Use    Smoking status: Never    Smokeless tobacco: Never   Substance and Sexual Activity    Alcohol use: Yes     Alcohol/week: 3.0 standard drinks     Types: 1 Glasses of wine, 1 Cans of beer, 1 Shots of liquor per week     Comment: 2 drink per week     Drug use: No    Sexual activity: Yes     Partners: Male     Birth control/protection: None, Condom     Review of Systems   Constitutional:  Positive for activity change and appetite change. Negative for chills, diaphoresis and fever.   HENT:  Positive for facial swelling.    Eyes:  Positive for pain, redness and itching.   Respiratory: Negative.     Cardiovascular: Negative.    Gastrointestinal:  Positive for abdominal pain, nausea and vomiting.   Genitourinary: Negative.    Musculoskeletal: Negative.    Skin:  Positive for rash.   Neurological:  Negative.    Psychiatric/Behavioral: Negative.     Objective:     Vital Signs (Most Recent):  Temp: 98.2 °F (36.8 °C) (11/18/22 1658)  Pulse: 88 (11/18/22 1658)  Resp: 16 (11/18/22 1658)  BP: 111/65 (11/18/22 1658)  SpO2: 99 % (11/18/22 1658) Vital Signs (24h Range):  Temp:  [98 °F (36.7 °C)-98.2 °F (36.8 °C)] 98.2 °F (36.8 °C)  Pulse:  [] 88  Resp:  [16-17] 16  SpO2:  [98 %-99 %] 99 %  BP: (109-113)/(65-81) 111/65     Weight: 70.8 kg (156 lb)  Body mass index is 22.38 kg/m².    Physical Exam  Constitutional:       General: She is not in acute distress.     Appearance: Normal appearance.   HENT:      Head: Normocephalic and atraumatic.      Nose: Nose normal.      Mouth/Throat:      Mouth: Mucous membranes are dry.   Eyes:      General:         Right eye: No discharge.         Left eye: No discharge.      Extraocular Movements: Extraocular movements intact.      Pupils: Pupils are equal, round, and reactive to light.   Cardiovascular:      Rate and Rhythm: Normal rate and regular rhythm.      Pulses: Normal pulses.      Heart sounds: No murmur heard.  Pulmonary:      Effort: Pulmonary effort is normal.      Breath sounds: Normal breath sounds. No wheezing.   Abdominal:      General: Abdomen is flat. There is no distension.      Palpations: Abdomen is soft.      Tenderness: There is abdominal tenderness.   Musculoskeletal:         General: Normal range of motion.      Cervical back: Normal range of motion and neck supple.   Lymphadenopathy:      Cervical: No cervical adenopathy.   Skin:     General: Skin is warm and dry.      Findings: Erythema present.      Comments: Erythema and edema around both eyes   Neurological:      General: No focal deficit present.      Mental Status: She is alert and oriented to person, place, and time.      Cranial Nerves: No cranial nerve deficit.   Psychiatric:         Mood and Affect: Mood normal.         Behavior: Behavior normal.         CRANIAL NERVES     CN III, IV, VI    Pupils are equal, round, and reactive to light.     Significant Labs: All pertinent labs within the past 24 hours have been reviewed.  A1C: No results for input(s): HGBA1C in the last 4320 hours.  Blood Culture: No results for input(s): LABBLOO in the last 48 hours.  CBC:   Recent Labs   Lab 11/18/22  1340   WBC 13.82*   HGB 15.7   HCT 48.3        CMP:   Recent Labs   Lab 11/18/22  1340 11/18/22  1528   * 133*   K 3.6 3.6    109   CO2 8* 10*   * 253*   BUN 7 7   CREATININE 1.1 0.8   CALCIUM 9.8 8.5*   PROT 8.8*  --    ALBUMIN 3.8  --    BILITOT 0.3  --    ALKPHOS 151*  --    AST 8*  --    ALT 9*  --    ANIONGAP 23* 14     Lactic Acid:   Recent Labs   Lab 11/18/22  1528   LACTATE 0.7     Magnesium: No results for input(s): MG in the last 48 hours.  POCT Glucose:   Recent Labs   Lab 11/18/22  1530 11/18/22  1536 11/18/22  1648   POCTGLUCOSE 221* 209* 204*     TSH:   Recent Labs   Lab 11/18/22  1528   TSH 0.699     Urine Studies:   Recent Labs   Lab 11/18/22  1404   COLORU Yellow   APPEARANCEUA Clear   PHUR 6.0   SPECGRAV >=1.030*   PROTEINUA Trace*   GLUCUA 3+*   KETONESU 3+*   BILIRUBINUA Negative   OCCULTUA Trace*   NITRITE Negative   UROBILINOGEN Negative   LEUKOCYTESUR Negative   RBCUA 0   BACTERIA None       Significant Imaging: I have reviewed all pertinent imaging results/findings within the past 24 hours.

## 2022-11-18 NOTE — ED PROVIDER NOTES
Encounter Date: 11/18/2022    SCRIBE #1 NOTE: ILexus, am scribing for, and in the presence of,  Sanjay Lees MD.     History     Chief Complaint   Patient presents with    Facial Swelling    Nausea    Hyperglycemia     S/S of scalp infection x 1 week followed by hyperglycemia (pt is Type 1 DM)     Time seen by provider: 1:21 PM on 11/18/2022    Adore Estes is a 31 y.o. female with a PMHx of DM who presents to the ED for evaluation of diffuse facial swelling.  She reports worsening Sx since onset ~ 1 week ago s/p an injury to the crown of the head via the freezer door.  Patient was seen 2 days ago for similar Sx to the R face only and note that since being evaluated the facial swelling has progressed to include the L side.  She agrees that the swelling is greatest over the periorbital region.      She has been compliant with taking newly prescribed Vibramycin 100 mg BID for the past 2 days.  Patient has recently discontinued Bactrim 160 mg BID and Mupirocin 2% TID that was prescribed by urgent care 4 days ago for similar Sx.  Patient notes acute N/V that presents with a decrease in oral intake since last evaluation.  She has attempted to remedy this with eating ice chips and increasing water intake without relief of thirst.  Since starting Vibramycin she notes an elevated blood glucose reading at home that registered 389 mg/dL at breakfast today.  She confirms this is normal for her when she is on Abx therapies.  The patient denies any other symptoms at this time.  Allergies to Amoxicillin discussed.  Personal Hx of DKA and frequent staph infections, per patient.  No pertinent PSHx.      The history is provided by the patient.   Review of patient's allergies indicates:   Allergen Reactions    Cerebyx [fosphenytoin] Itching    Amoxil [amoxicillin] Hives     Past Medical History:   Diagnosis Date    Diabetes mellitus     Diabetes mellitus type I     Diabetes mellitus, type 2     Insulin pump in  place     Seizures     last seizure       Past Surgical History:   Procedure Laterality Date    APPENDECTOMY  2017     SECTION       Family History   Problem Relation Age of Onset    Breast cancer Maternal Aunt     Asthma Sister     Colon cancer Neg Hx     Ovarian cancer Neg Hx      Social History     Tobacco Use    Smoking status: Never    Smokeless tobacco: Never   Substance Use Topics    Alcohol use: Yes     Alcohol/week: 3.0 standard drinks     Types: 1 Glasses of wine, 1 Cans of beer, 1 Shots of liquor per week     Comment: 2 drink per week     Drug use: No     Review of Systems   Constitutional:  Positive for appetite change (decreased).   HENT:  Positive for facial swelling (diffuse; greatest over the periorbital region).    Eyes:  Positive for visual disturbance (secondary to facial swelling).   Gastrointestinal:  Positive for nausea and vomiting.   Endocrine: Positive for polydipsia.   Skin:  Positive for wound (to the crown of the head).   Neurological:  Positive for headaches (pressure-like over bilateral temples).   All other systems reviewed and are negative.    Physical Exam     Initial Vitals   BP Pulse Resp Temp SpO2   22 1343 22 1343 22 1457 22 1457 22 1343   113/81 (!) 113 17 98 °F (36.7 °C) 99 %      MAP       --                Physical Exam    Nursing note and vitals reviewed.  Constitutional: She appears well-developed and well-nourished. She is not diaphoretic.  Non-toxic appearance. She does not have a sickly appearance. She does not appear ill. No distress.   She is tearful on exam.     HENT:   Head: Normocephalic. Head is with abrasion.   Mouth/Throat: Mucous membranes are dry.   She has dry lips on exam and a R scalp abrasion to the high parietal that is tender to palpation.     Eyes: EOM are normal.   Periorbital edema noted bilaterally.  No calor no erythema.   Neck: Neck supple.   Normal range of motion.  Cardiovascular:  Regular rhythm and  normal heart sounds.   Tachycardia present.   Exam reveals no gallop and no friction rub.       No murmur heard.  Pulmonary/Chest: Breath sounds normal. No respiratory distress. She has no wheezes. She has no rhonchi. She has no rales.   Abdominal: Abdomen is soft. She exhibits no distension. There is no abdominal tenderness. There is no rebound.   Musculoskeletal:         General: Normal range of motion.      Cervical back: Normal range of motion and neck supple.     Neurological: She is alert and oriented to person, place, and time.   Skin: Skin is warm and dry.   Psychiatric: She has a normal mood and affect. Her behavior is normal. Judgment and thought content normal.       ED Course   Critical Care    Date/Time: 11/18/2022 2:40 PM  Performed by: Sanjay Lees MD  Authorized by: Sanjay Lees MD   Direct patient critical care time: 30 minutes  Additional history critical care time: 8 minutes  Ordering / reviewing critical care time: 5 minutes  Documentation critical care time: 6 minutes  Consulting other physicians critical care time: 5 minutes  Total critical care time (exclusive of procedural time) : 54 minutes  Critical care time was exclusive of separately billable procedures and treating other patients and teaching time.  Critical care was necessary to treat or prevent imminent or life-threatening deterioration of the following conditions: DKA.  Critical care was time spent personally by me on the following activities: blood draw for specimens, development of treatment plan with patient or surrogate, discussions with consultants, evaluation of patient's response to treatment, examination of patient, obtaining history from patient or surrogate, ordering and performing treatments and interventions, ordering and review of laboratory studies, pulse oximetry, re-evaluation of patient's condition and review of old charts.      Labs Reviewed   CBC W/ AUTO DIFFERENTIAL - Abnormal; Notable for the following  components:       Result Value    WBC 13.82 (*)     Immature Granulocytes 0.9 (*)     Gran # (ANC) 11.5 (*)     Immature Grans (Abs) 0.12 (*)     Gran % 83.1 (*)     Lymph % 10.6 (*)     All other components within normal limits   COMPREHENSIVE METABOLIC PANEL - Abnormal; Notable for the following components:    Sodium 134 (*)     CO2 8 (*)     Glucose 348 (*)     Total Protein 8.8 (*)     Alkaline Phosphatase 151 (*)     AST 8 (*)     ALT 9 (*)     Anion Gap 23 (*)     All other components within normal limits    Narrative:      CO2  critical result(s) called and verbal readback obtained from   Peggy Acosta by CLPHILLIP 11/18/2022 14:13   BETA - HYDROXYBUTYRATE, SERUM - Abnormal; Notable for the following components:    Beta-Hydroxybutyrate 5.3 (*)     All other components within normal limits   URINALYSIS, REFLEX TO URINE CULTURE - Abnormal; Notable for the following components:    Specific Gravity, UA >=1.030 (*)     Protein, UA Trace (*)     Glucose, UA 3+ (*)     Ketones, UA 3+ (*)     Occult Blood UA Trace (*)     All other components within normal limits    Narrative:     Specimen Source->Urine   C-REACTIVE PROTEIN - Abnormal; Notable for the following components:    CRP 85.4 (*)     All other components within normal limits   SEDIMENTATION RATE - Abnormal; Notable for the following components:    Sed Rate 43 (*)     All other components within normal limits   BASIC METABOLIC PANEL - Abnormal; Notable for the following components:    Sodium 133 (*)     CO2 10 (*)     Glucose 253 (*)     Calcium 8.5 (*)     All other components within normal limits    Narrative:     If not done in ED for female under 55  If not done in ED.   POCT GLUCOSE - Abnormal; Notable for the following components:    POCT Glucose 322 (*)     All other components within normal limits   POCT GLUCOSE - Abnormal; Notable for the following components:    POCT Glucose 293 (*)     All other components within normal limits   ISTAT PROCEDURE - Abnormal;  Notable for the following components:    POC PH 7.193 (*)     POC PCO2 24.4 (*)     POC HCO3 9.4 (*)     POC SATURATED O2 76 (*)     POC TCO2 10 (*)     All other components within normal limits   POCT GLUCOSE - Abnormal; Notable for the following components:    POCT Glucose 221 (*)     All other components within normal limits   POCT GLUCOSE - Abnormal; Notable for the following components:    POCT Glucose 209 (*)     All other components within normal limits   MRSA SCREEN BY PCR   URINALYSIS MICROSCOPIC    Narrative:     Specimen Source->Urine   LACTIC ACID, PLASMA    Narrative:     If not done in ED for female under 55  If not done in ED.   PREGNANCY TEST SCREENING, SERUM    Narrative:     If not done in ED for female under 55  If not done in ED.   LIPASE    Narrative:     If not done in ED for female under 55  If not done in ED.   HEMOGLOBIN A1C   TSH   C-PEPTIDE   POCT GLUCOSE MONITORING CONTINUOUS          Imaging Results    None          Medications   sodium chloride 0.9% flush 10 mL (has no administration in time range)   0.9%  NaCl infusion (has no administration in time range)   dextrose 5 % and 0.45 % NaCl infusion (has no administration in time range)   ondansetron injection 4 mg (has no administration in time range)   acetaminophen tablet 650 mg (has no administration in time range)   dextrose 10% bolus 125 mL (has no administration in time range)   dextrose 10% bolus 250 mL (has no administration in time range)   enoxaparin injection 40 mg (has no administration in time range)   potassium chloride 10 mEq in 100 mL IVPB (has no administration in time range)     And   potassium chloride 10 mEq in 100 mL IVPB (has no administration in time range)     And   potassium chloride 10 mEq in 100 mL IVPB (has no administration in time range)   insulin regular 1 Units/mL in sodium chloride 0.9% 100 mL infusion (2.5 Units/hr Intravenous New Bag 11/18/22 7570)   melatonin tablet 6 mg (has no administration in time  range)   famotidine (PF) injection 20 mg (has no administration in time range)   prochlorperazine injection Soln 5 mg (has no administration in time range)   senna-docusate 8.6-50 mg per tablet 1 tablet (has no administration in time range)   polyethylene glycol packet 17 g (has no administration in time range)   morphine injection 2 mg (has no administration in time range)   vancomycin - pharmacy to dose (has no administration in time range)   vancomycin 1.25 g in dextrose 5% 250 mL IVPB (ready to mix) (1,250 mg Intravenous Trough Due As Scheduled Before Dose 11/19/22 1530)   sodium chloride 0.9% bolus 1,000 mL (0 mLs Intravenous Stopped 11/18/22 1529)   ondansetron injection 4 mg (4 mg Intravenous Given 11/18/22 1411)     Medical Decision Making:   History:   Old Medical Records: I decided to obtain old medical records.  Clinical Tests:   Lab Tests: Ordered and Reviewed  Radiological Study: Ordered and Reviewed  Medical Tests: Ordered and Reviewed        Scribe Attestation:   Scribe #1: I performed the above scribed service and the documentation accurately describes the services I performed. I attest to the accuracy of the note.      ED Course as of 11/18/22 1620   Fri Nov 18, 2022   1334 POCT Glucose(!): 322 [EF]   1356 WBC(!): 13.82 [EF]   1357 Beta-Hydroxybutyrate(!): 5.3 [EF]   1415 Sodium(!): 134 [EF]   1415 Potassium: 3.6 [EF]   1415 Chloride: 103 [EF]   1415 CO2(!!): 8 [EF]   1415 Glucose(!): 348 [EF]   1415 Alkaline Phosphatase(!): 151 [EF]   1415 AST(!): 8 [EF]   1415 ALT(!): 9 [EF]   1415 Anion Gap(!): 23 [EF]   1421 Dr maya to admit for dka [EF]   1432 CRP(!): 85.4 [EF]      ED Course User Index  [EF] Sanjay Lees MD               I, Dr. Lees, personally performed the services described in this documentation. All medical record entries made by the scribe were at my direction and in my presence.  I have reviewed the chart and agree that the record reflects my personal performance and is  accurate and complete.4:20 PM 11/18/2022      Clinical Impression:   Final diagnoses:  [E10.10] Diabetic ketoacidosis without coma associated with type 1 diabetes mellitus (Primary)      ED Disposition Condition    Observation                  31-year-old female with diabetes presents with nausea vomiting and malaise.  Labs are consistent with DKA.  Insulin drip started in the ER.  She has a wound to the top of her head with mild swelling surrounding it.  She has some mild periorbital edema.  She does not have any calor or facial erythema.  She will be admitted to Hospital Medicine.  Any antibiotics will be deferred to them.     Sanjay Lees MD  11/18/22 1345

## 2022-11-18 NOTE — PROGRESS NOTES
Pharmacokinetic Initial Assessment: IV Vancomycin    Assessment/Plan:    Initiate intravenous vancomycin 1250 mg IV every 12 hours  Desired empiric serum trough concentration is 10 to 15 mcg/mL  Draw vancomycin trough level 60 min prior to third dose on 11/19 at approximately 1530  Pharmacy will continue to follow and monitor vancomycin.      Please contact pharmacy at extension [phone] with any questions regarding this assessment.     Thank you for the consult,   Hope Fregoso       Patient brief summary:  Adore Estes is a 31 y.o. female initiated on antimicrobial therapy with IV Vancomycin for treatment of suspected skin & soft tissue infection    Drug Allergies:   Review of patient's allergies indicates:   Allergen Reactions    Cerebyx [fosphenytoin] Itching    Amoxil [amoxicillin] Hives       Actual Body Weight:   70.8 kg    Renal Function:   Estimated Creatinine Clearance: 109.2 mL/min (based on SCr of 0.8 mg/dL).,     Dialysis Method (if applicable):  N/A    CBC (last 72 hours):  Recent Labs   Lab Result Units 11/18/22  1340   WBC K/uL 13.82*   Hemoglobin g/dL 15.7   Hematocrit % 48.3   Platelets K/uL 377   Gran % % 83.1*   Lymph % % 10.6*   Mono % % 4.8   Eosinophil % % 0.2   Basophil % % 0.4   Differential Method  Automated       Metabolic Panel (last 72 hours):  Recent Labs   Lab Result Units 11/18/22  1340 11/18/22  1404 11/18/22  1528   Sodium mmol/L 134*  --  133*   Potassium mmol/L 3.6  --  3.6   Chloride mmol/L 103  --  109   CO2 mmol/L 8*  --  10*   Glucose mg/dL 348*  --  253*   Glucose, UA   --  3+*  --    BUN mg/dL 7  --  7   Creatinine mg/dL 1.1  --  0.8   Albumin g/dL 3.8  --   --    Total Bilirubin mg/dL 0.3  --   --    Alkaline Phosphatase U/L 151*  --   --    AST U/L 8*  --   --    ALT U/L 9*  --   --        Drug levels (last 3 results):  No results for input(s): VANCOMYCINRA, VANCORANDOM, VANCOMYCINPE, VANCOPEAK, VANCOMYCINTR, VANCOTROUGH in the last 72 hours.    Microbiologic  Results:  Microbiology Results (last 7 days)       Procedure Component Value Units Date/Time    MRSA Screen by PCR [[id number]]     Order Status: No result Specimen: Nasopharyngeal Swab from Nasal

## 2022-11-18 NOTE — ASSESSMENT & PLAN NOTE
Barbara to her h/o MRSA- will screen on admit  Vancomycin IV - pharmacy to dose   Blood culture

## 2022-11-18 NOTE — NURSING
Pt alert and oriented, vitals stable, noted both lower eyelid swollen,updated wit care, verbalized understanding.

## 2022-11-18 NOTE — H&P
[facility] Hospital Medicine  History & Physical    Patient Name: Adore Estes  MRN: [id number]  Patient Class: OP- Observation  Admission Date: 2022  Attending Physician: Ruth Jaimes MD   Primary Care Provider: Pascual Marie MD         Patient information was obtained from patient, past medical records and ER records.     Subjective:     Principal Problem:Type 1 diabetes mellitus with ketoacidosis without coma    Chief Complaint:   Chief Complaint   Patient presents with    Facial Swelling    Nausea    Hyperglycemia     S/S of scalp infection x 1 week followed by hyperglycemia (pt is Type 1 DM)        HPI: 31 year female presented to ED with nausea and face swelling and pain around both eyes. She started with cellulitis in scalp that progressed to forehead. She did not tolerate bactrim and came to ED for an evaluation as the cellulitis progressed with more swelling. She was switched to doxycyline a few days ago and cellulitis still did not improve. With worsening infection, she became nauseated and now in DKA. On workup: WBC 12,000 with a left shift, ESR 43, CRP 85, Beta-hydroxy 5.3, serum CO2 8 Glucose 348, AG 23.  She was admitted on the DKA pathway. Given NS IVF and insulin drip. NPO.    MRSA screen pending. 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 Vancomycin IV started. Blood cultures pending.       Past Medical History:   Diagnosis Date    Diabetes mellitus     Diabetes mellitus type I     Diabetes mellitus, type 2     Insulin pump in place     Seizures     last seizure         Past Surgical History:   Procedure Laterality Date    APPENDECTOMY  2017     SECTION         Review of patient's allergies indicates:   Allergen Reactions    Cerebyx [fosphenytoin] Itching    Amoxil [amoxicillin] Hives       No current facility-administered medications on file prior to encounter.     Current Outpatient Medications on File Prior to Encounter   Medication Sig     blood sugar diagnostic Strp Check 5x daily for hypoglycemia.  True Metrix    doxycycline (VIBRAMYCIN) 100 MG Cap Take 1 capsule (100 mg total) by mouth 2 (two) times daily. for 10 days    insulin aspart U-100 (NOVOLOG) 100 unit/mL (3 mL) InPn pen To use with carb ratio 1:4, ISF: 25 MDD: 80 units    insulin detemir U-100 (LEVEMIR FLEXTOUCH) 100 unit/mL (3 mL) SubQ InPn pen Inject 15 Units into the skin 2 (two) times daily. MDD: 30 units     Family History       Problem Relation (Age of Onset)    Asthma Sister    Breast cancer Maternal Aunt          Tobacco Use    Smoking status: Never    Smokeless tobacco: Never   Substance and Sexual Activity    Alcohol use: Yes     Alcohol/week: 3.0 standard drinks     Types: 1 Glasses of wine, 1 Cans of beer, 1 Shots of liquor per week     Comment: 2 drink per week     Drug use: No    Sexual activity: Yes     Partners: Male     Birth control/protection: None, Condom     Review of Systems   Constitutional:  Positive for activity change and appetite change. Negative for chills, diaphoresis and fever.   HENT:  Positive for facial swelling.    Eyes:  Positive for pain, redness and itching.   Respiratory: Negative.     Cardiovascular: Negative.    Gastrointestinal:  Positive for abdominal pain, nausea and vomiting.   Genitourinary: Negative.    Musculoskeletal: Negative.    Skin:  Positive for rash.   Neurological: Negative.    Psychiatric/Behavioral: Negative.     Objective:     Vital Signs (Most Recent):  Temp: 98.2 °F (36.8 °C) (11/18/22 1658)  Pulse: 88 (11/18/22 1658)  Resp: 16 (11/18/22 1658)  BP: 111/65 (11/18/22 1658)  SpO2: 99 % (11/18/22 1658) Vital Signs (24h Range):  Temp:  [98 °F (36.7 °C)-98.2 °F (36.8 °C)] 98.2 °F (36.8 °C)  Pulse:  [] 88  Resp:  [16-17] 16  SpO2:  [98 %-99 %] 99 %  BP: (109-113)/(65-81) 111/65     Weight: 70.8 kg (156 lb)  Body mass index is 22.38 kg/m².    Physical Exam  Constitutional:       General: She is not in acute distress.      Appearance: Normal appearance.   HENT:      Head: Normocephalic and atraumatic.      Nose: Nose normal.      Mouth/Throat:      Mouth: Mucous membranes are dry.   Eyes:      General:         Right eye: No discharge.         Left eye: No discharge.      Extraocular Movements: Extraocular movements intact.      Pupils: Pupils are equal, round, and reactive to light.   Cardiovascular:      Rate and Rhythm: Normal rate and regular rhythm.      Pulses: Normal pulses.      Heart sounds: No murmur heard.  Pulmonary:      Effort: Pulmonary effort is normal.      Breath sounds: Normal breath sounds. No wheezing.   Abdominal:      General: Abdomen is flat. There is no distension.      Palpations: Abdomen is soft.      Tenderness: There is abdominal tenderness.   Musculoskeletal:         General: Normal range of motion.      Cervical back: Normal range of motion and neck supple.   Lymphadenopathy:      Cervical: No cervical adenopathy.   Skin:     General: Skin is warm and dry.      Findings: Erythema present.      Comments: Erythema and edema around both eyes   Neurological:      General: No focal deficit present.      Mental Status: She is alert and oriented to person, place, and time.      Cranial Nerves: No cranial nerve deficit.   Psychiatric:         Mood and Affect: Mood normal.         Behavior: Behavior normal.         CRANIAL NERVES     CN III, IV, VI   Pupils are equal, round, and reactive to light.     Significant Labs: All pertinent labs within the past 24 hours have been reviewed.  A1C: No results for input(s): HGBA1C in the last 4320 hours.  Blood Culture: No results for input(s): LABBLOO in the last 48 hours.  CBC:   Recent Labs   Lab 11/18/22  1340   WBC 13.82*   HGB 15.7   HCT 48.3        CMP:   Recent Labs   Lab 11/18/22  1340 11/18/22  1528   * 133*   K 3.6 3.6    109   CO2 8* 10*   * 253*   BUN 7 7   CREATININE 1.1 0.8   CALCIUM 9.8 8.5*   PROT 8.8*  --    ALBUMIN 3.8  --     BILITOT 0.3  --    ALKPHOS 151*  --    AST 8*  --    ALT 9*  --    ANIONGAP 23* 14     Lactic Acid:   Recent Labs   Lab 11/18/22  1528   LACTATE 0.7     Magnesium: No results for input(s): MG in the last 48 hours.  POCT Glucose:   Recent Labs   Lab 11/18/22  1530 11/18/22  1536 11/18/22  1648   POCTGLUCOSE 221* 209* 204*     TSH:   Recent Labs   Lab 11/18/22  1528   TSH 0.699     Urine Studies:   Recent Labs   Lab 11/18/22  1404   COLORU Yellow   APPEARANCEUA Clear   PHUR 6.0   SPECGRAV >=1.030*   PROTEINUA Trace*   GLUCUA 3+*   KETONESU 3+*   BILIRUBINUA Negative   OCCULTUA Trace*   NITRITE Negative   UROBILINOGEN Negative   LEUKOCYTESUR Negative   RBCUA 0   BACTERIA None       Significant Imaging: I have reviewed all pertinent imaging results/findings within the past 24 hours.    Assessment/Plan:     * Type 1 diabetes mellitus with ketoacidosis without coma  Patient's FSGs are uncontrolled due to hyperglycemia on current medication regimen.  Last A1c reviewed-   Lab Results   Component Value Date    HGBA1C 14.0 (H) 11/30/2021     Most recent fingerstick glucose reviewed-   Recent Labs   Lab 11/18/22  1433 11/18/22  1530 11/18/22  1536 11/18/22  1648   POCTGLUCOSE 293* 221* 209* 204*     Current correctional scale  Medium  Increase anti-hyperglycemic dose as follows-   Antihyperglycemics (From admission, onward)    Start     Stop Route Frequency Ordered    11/18/22 1615  insulin regular 1 Units/mL in sodium chloride 0.9% 100 mL infusion        Question Answer Comment   Insulin Rate Adjustment (DO NOT MODIFY ANSWER) [email or web address]    Enter initial dose from Infusion Protocol Chart (Units/hr): 2.5        -- IV Continuous 11/18/22 1515        Hold Oral hypoglycemics while patient is in the hospital.    Once AG closes and CO2 increases closer to 18-20, dc insulin infusion; switch IVF and resume diabetic diet.   HA1c pending  Home regimen is LEvemir + novolog  + SSI     Facial cellulitis  Barbara to her h/o MRSA- will screen on admit  Vancomycin IV - pharmacy to dose   Blood culture       High anion gap metabolic acidosis    Secondary to DKA/water deficit   Aggressive IVF         VTE Risk Mitigation (From admission, onward)         Ordered     enoxaparin injection 40 mg  Daily         11/18/22 1515     IP VTE HIGH RISK PATIENT  Once         11/18/22 1515     Place KIANA hose  Until discontinued         11/18/22 1515     Place sequential compression device  Until discontinued         11/18/22 1515                   Ruth Jaimes MD  Department of Hospital Medicine   [facility]

## 2022-11-18 NOTE — ASSESSMENT & PLAN NOTE
Patient's FSGs are uncontrolled due to hyperglycemia on current medication regimen.  Last A1c reviewed-   Lab Results   Component Value Date    HGBA1C 14.0 (H) 11/30/2021     Most recent fingerstick glucose reviewed-   Recent Labs   Lab 11/18/22  1433 11/18/22  1530 11/18/22  1536 11/18/22  1648   POCTGLUCOSE 293* 221* 209* 204*     Current correctional scale  Medium  Increase anti-hyperglycemic dose as follows-   Antihyperglycemics (From admission, onward)    Start     Stop Route Frequency Ordered    11/18/22 1615  insulin regular 1 Units/mL in sodium chloride 0.9% 100 mL infusion        Question Answer Comment   Insulin Rate Adjustment (DO NOT MODIFY ANSWER) \\[email or web address]\epic\Images\[email or web address]\InsulinInfusions\InsulinDKA [id number].[email or web address]    Enter initial dose from Infusion Protocol Chart (Units/hr): 2.5        -- IV Continuous 11/18/22 1515        Hold Oral hypoglycemics while patient is in the hospital.    Once AG closes and CO2 increases closer to 18-20, dc insulin infusion; switch IVF and resume diabetic diet.   HA1c pending  Home regimen is LEvemir + novolog + SSI

## 2022-11-18 NOTE — ED NOTES
Facial swelling noted, pt previously here 11/16/2022 for cellulitis of the face due to hitting crown of head with freezer. Pt reports nausea, and hyperglycemia. Pt denies difficultly breathing.

## 2022-11-19 PROBLEM — E83.39 HYPOPHOSPHATEMIA: Status: ACTIVE | Noted: 2022-11-19

## 2022-11-19 PROBLEM — E87.6 HYPOKALEMIA: Status: ACTIVE | Noted: 2022-11-19

## 2022-11-19 PROBLEM — E83.42 HYPOMAGNESEMIA: Status: ACTIVE | Noted: 2022-11-19

## 2022-11-19 LAB
ANION GAP SERPL CALC-SCNC: 11 MMOL/L (ref 8–16)
ANION GAP SERPL CALC-SCNC: 11 MMOL/L (ref 8–16)
ANION GAP SERPL CALC-SCNC: 13 MMOL/L (ref 8–16)
ANION GAP SERPL CALC-SCNC: 14 MMOL/L (ref 8–16)
BASOPHILS # BLD AUTO: 0.04 K/UL (ref 0–0.2)
BASOPHILS NFR BLD: 0.4 % (ref 0–1.9)
BUN SERPL-MCNC: 2 MG/DL (ref 6–20)
BUN SERPL-MCNC: 2 MG/DL (ref 6–20)
BUN SERPL-MCNC: 3 MG/DL (ref 6–20)
BUN SERPL-MCNC: 3 MG/DL (ref 6–20)
BUN SERPL-MCNC: 4 MG/DL (ref 6–20)
BUN SERPL-MCNC: <2 MG/DL (ref 6–20)
CALCIUM SERPL-MCNC: 8.2 MG/DL (ref 8.7–10.5)
CALCIUM SERPL-MCNC: 8.3 MG/DL (ref 8.7–10.5)
CALCIUM SERPL-MCNC: 8.5 MG/DL (ref 8.7–10.5)
CALCIUM SERPL-MCNC: 8.5 MG/DL (ref 8.7–10.5)
CHLORIDE SERPL-SCNC: 103 MMOL/L (ref 95–110)
CHLORIDE SERPL-SCNC: 105 MMOL/L (ref 95–110)
CHLORIDE SERPL-SCNC: 106 MMOL/L (ref 95–110)
CHLORIDE SERPL-SCNC: 108 MMOL/L (ref 95–110)
CHOLEST SERPL-MCNC: 203 MG/DL (ref 120–199)
CHOLEST/HDLC SERPL: 4.3 {RATIO} (ref 2–5)
CO2 SERPL-SCNC: 13 MMOL/L (ref 23–29)
CO2 SERPL-SCNC: 15 MMOL/L (ref 23–29)
CO2 SERPL-SCNC: 16 MMOL/L (ref 23–29)
CO2 SERPL-SCNC: 17 MMOL/L (ref 23–29)
CREAT SERPL-MCNC: 0.6 MG/DL (ref 0.5–1.4)
CREAT SERPL-MCNC: 0.6 MG/DL (ref 0.5–1.4)
CREAT SERPL-MCNC: 0.7 MG/DL (ref 0.5–1.4)
DIFFERENTIAL METHOD: ABNORMAL
EOSINOPHIL # BLD AUTO: 0.1 K/UL (ref 0–0.5)
EOSINOPHIL NFR BLD: 1.1 % (ref 0–8)
ERYTHROCYTE [DISTWIDTH] IN BLOOD BY AUTOMATED COUNT: 11.8 % (ref 11.5–14.5)
EST. GFR  (NO RACE VARIABLE): >60 ML/MIN/1.73 M^2
ESTIMATED AVG GLUCOSE: 335 MG/DL (ref 68–131)
GLUCOSE SERPL-MCNC: 248 MG/DL (ref 70–110)
GLUCOSE SERPL-MCNC: 260 MG/DL (ref 70–110)
GLUCOSE SERPL-MCNC: 262 MG/DL (ref 70–110)
GLUCOSE SERPL-MCNC: 265 MG/DL (ref 70–110)
GLUCOSE SERPL-MCNC: 269 MG/DL (ref 70–110)
GLUCOSE SERPL-MCNC: 364 MG/DL (ref 70–110)
HBA1C MFR BLD: 13.3 % (ref 4–5.6)
HCT VFR BLD AUTO: 37.4 % (ref 37–48.5)
HDLC SERPL-MCNC: 47 MG/DL (ref 40–75)
HDLC SERPL: 23.2 % (ref 20–50)
HGB BLD-MCNC: 12.6 G/DL (ref 12–16)
IMM GRANULOCYTES # BLD AUTO: 0.06 K/UL (ref 0–0.04)
IMM GRANULOCYTES NFR BLD AUTO: 0.7 % (ref 0–0.5)
LDLC SERPL CALC-MCNC: 123.2 MG/DL (ref 63–159)
LYMPHOCYTES # BLD AUTO: 1.4 K/UL (ref 1–4.8)
LYMPHOCYTES NFR BLD: 15.4 % (ref 18–48)
MAGNESIUM SERPL-MCNC: 1.5 MG/DL (ref 1.6–2.6)
MCH RBC QN AUTO: 29.4 PG (ref 27–31)
MCHC RBC AUTO-ENTMCNC: 33.7 G/DL (ref 32–36)
MCV RBC AUTO: 87 FL (ref 82–98)
MONOCYTES # BLD AUTO: 0.7 K/UL (ref 0.3–1)
MONOCYTES NFR BLD: 7.6 % (ref 4–15)
NEUTROPHILS # BLD AUTO: 6.9 K/UL (ref 1.8–7.7)
NEUTROPHILS NFR BLD: 74.8 % (ref 38–73)
NONHDLC SERPL-MCNC: 156 MG/DL
NRBC BLD-RTO: 0 /100 WBC
PHOSPHATE SERPL-MCNC: 1.8 MG/DL (ref 2.7–4.5)
PLATELET # BLD AUTO: 297 K/UL (ref 150–450)
PMV BLD AUTO: 9.4 FL (ref 9.2–12.9)
POCT GLUCOSE: 197 MG/DL (ref 70–110)
POCT GLUCOSE: 211 MG/DL (ref 70–110)
POCT GLUCOSE: 214 MG/DL (ref 70–110)
POCT GLUCOSE: 215 MG/DL (ref 70–110)
POCT GLUCOSE: 218 MG/DL (ref 70–110)
POCT GLUCOSE: 222 MG/DL (ref 70–110)
POCT GLUCOSE: 227 MG/DL (ref 70–110)
POCT GLUCOSE: 236 MG/DL (ref 70–110)
POCT GLUCOSE: 237 MG/DL (ref 70–110)
POCT GLUCOSE: 239 MG/DL (ref 70–110)
POCT GLUCOSE: 241 MG/DL (ref 70–110)
POCT GLUCOSE: 241 MG/DL (ref 70–110)
POCT GLUCOSE: 242 MG/DL (ref 70–110)
POCT GLUCOSE: 243 MG/DL (ref 70–110)
POCT GLUCOSE: 244 MG/DL (ref 70–110)
POCT GLUCOSE: 248 MG/DL (ref 70–110)
POCT GLUCOSE: 256 MG/DL (ref 70–110)
POCT GLUCOSE: 262 MG/DL (ref 70–110)
POCT GLUCOSE: 278 MG/DL (ref 70–110)
POCT GLUCOSE: 297 MG/DL (ref 70–110)
POCT GLUCOSE: 392 MG/DL (ref 70–110)
POTASSIUM SERPL-SCNC: 3.1 MMOL/L (ref 3.5–5.1)
POTASSIUM SERPL-SCNC: 3.2 MMOL/L (ref 3.5–5.1)
POTASSIUM SERPL-SCNC: 3.3 MMOL/L (ref 3.5–5.1)
POTASSIUM SERPL-SCNC: 3.4 MMOL/L (ref 3.5–5.1)
POTASSIUM SERPL-SCNC: 3.5 MMOL/L (ref 3.5–5.1)
POTASSIUM SERPL-SCNC: 3.6 MMOL/L (ref 3.5–5.1)
RBC # BLD AUTO: 4.29 M/UL (ref 4–5.4)
SODIUM SERPL-SCNC: 133 MMOL/L (ref 136–145)
SODIUM SERPL-SCNC: 134 MMOL/L (ref 136–145)
SODIUM SERPL-SCNC: 135 MMOL/L (ref 136–145)
TRIGL SERPL-MCNC: 164 MG/DL (ref 30–150)
VANCOMYCIN TROUGH SERPL-MCNC: 5.1 UG/ML (ref 10–22)
WBC # BLD AUTO: 9.17 K/UL (ref 3.9–12.7)

## 2022-11-19 PROCEDURE — 83735 ASSAY OF MAGNESIUM: CPT | Performed by: HOSPITALIST

## 2022-11-19 PROCEDURE — 85025 COMPLETE CBC W/AUTO DIFF WBC: CPT | Performed by: HOSPITALIST

## 2022-11-19 PROCEDURE — 25000003 PHARM REV CODE 250: Performed by: NURSE PRACTITIONER

## 2022-11-19 PROCEDURE — 94761 N-INVAS EAR/PLS OXIMETRY MLT: CPT

## 2022-11-19 PROCEDURE — 84100 ASSAY OF PHOSPHORUS: CPT | Performed by: HOSPITALIST

## 2022-11-19 PROCEDURE — 25000003 PHARM REV CODE 250: Performed by: HOSPITALIST

## 2022-11-19 PROCEDURE — 80061 LIPID PANEL: CPT | Performed by: HOSPITALIST

## 2022-11-19 PROCEDURE — 80202 ASSAY OF VANCOMYCIN: CPT | Performed by: HOSPITALIST

## 2022-11-19 PROCEDURE — S5010 5% DEXTROSE AND 0.45% SALINE: HCPCS | Performed by: NURSE PRACTITIONER

## 2022-11-19 PROCEDURE — 12000002 HC ACUTE/MED SURGE SEMI-PRIVATE ROOM

## 2022-11-19 PROCEDURE — 36415 COLL VENOUS BLD VENIPUNCTURE: CPT | Performed by: HOSPITALIST

## 2022-11-19 PROCEDURE — 63600175 PHARM REV CODE 636 W HCPCS: Performed by: HOSPITALIST

## 2022-11-19 PROCEDURE — 80048 BASIC METABOLIC PNL TOTAL CA: CPT | Mod: 91 | Performed by: HOSPITALIST

## 2022-11-19 RX ADMIN — DEXTROSE AND SODIUM CHLORIDE: 5; .45 INJECTION, SOLUTION INTRAVENOUS at 10:11

## 2022-11-19 RX ADMIN — SODIUM CHLORIDE 1 UNITS/HR: 9 INJECTION, SOLUTION INTRAVENOUS at 08:11

## 2022-11-19 RX ADMIN — FAMOTIDINE 20 MG: 10 INJECTION, SOLUTION INTRAVENOUS at 08:11

## 2022-11-19 RX ADMIN — POLYETHYLENE GLYCOL 3350 17 G: 17 POWDER, FOR SOLUTION ORAL at 08:11

## 2022-11-19 RX ADMIN — SODIUM CHLORIDE 1.1 UNITS/HR: 9 INJECTION, SOLUTION INTRAVENOUS at 04:11

## 2022-11-19 RX ADMIN — SENNOSIDES AND DOCUSATE SODIUM 1 TABLET: 50; 8.6 TABLET ORAL at 08:11

## 2022-11-19 RX ADMIN — POTASSIUM CHLORIDE 10 MEQ: 7.46 INJECTION, SOLUTION INTRAVENOUS at 04:11

## 2022-11-19 RX ADMIN — SODIUM CHLORIDE 0.7 UNITS/HR: 9 INJECTION, SOLUTION INTRAVENOUS at 02:11

## 2022-11-19 RX ADMIN — DEXTROSE AND SODIUM CHLORIDE: 5; .45 INJECTION, SOLUTION INTRAVENOUS at 12:11

## 2022-11-19 RX ADMIN — VANCOMYCIN HYDROCHLORIDE 1250 MG: 1.25 INJECTION, POWDER, LYOPHILIZED, FOR SOLUTION INTRAVENOUS at 05:11

## 2022-11-19 RX ADMIN — POTASSIUM CHLORIDE 10 MEQ: 7.46 INJECTION, SOLUTION INTRAVENOUS at 11:11

## 2022-11-19 RX ADMIN — POTASSIUM CHLORIDE 10 MEQ: 7.46 INJECTION, SOLUTION INTRAVENOUS at 01:11

## 2022-11-19 RX ADMIN — ENOXAPARIN SODIUM 40 MG: 40 INJECTION SUBCUTANEOUS at 05:11

## 2022-11-19 RX ADMIN — ACETAMINOPHEN 650 MG: 325 TABLET ORAL at 11:11

## 2022-11-19 RX ADMIN — POTASSIUM CHLORIDE 10 MEQ: 7.46 INJECTION, SOLUTION INTRAVENOUS at 08:11

## 2022-11-19 RX ADMIN — DEXTROSE AND SODIUM CHLORIDE: 5; .45 INJECTION, SOLUTION INTRAVENOUS at 05:11

## 2022-11-19 RX ADMIN — VANCOMYCIN HYDROCHLORIDE 1500 MG: 1.5 INJECTION, POWDER, LYOPHILIZED, FOR SOLUTION INTRAVENOUS at 05:11

## 2022-11-19 NOTE — PROGRESS NOTES
Pharmacokinetic Assessment Follow Up: IV Vancomycin    Vancomycin serum concentration assessment(s):    The trough level was drawn correctly and can be used to guide therapy at this time. The measurement is below the desired definitive target range of 10 to 15 mcg/mL.    Vancomycin Regimen Plan:    Change regimen to Vancomycin 1500 mg IV every 12 hours with next serum trough concentration measured at 1530 prior to 3rd dose on 11/20/22.     Drug levels (last 3 results):  Recent Labs   Lab Result Units 11/19/22  1500   Vancomycin-Trough ug/mL 5.1*       Pharmacy will continue to follow and monitor vancomycin.    Please contact pharmacy at extension [phone] for questions regarding this assessment.    Thank you for the consult,   Evelina Adam, PharmD       Patient brief summary:  Adore Estes is a 31 y.o. female initiated on antimicrobial therapy with IV Vancomycin for treatment of skin & soft tissue infection      Drug Allergies:   Review of patient's allergies indicates:   Allergen Reactions    Cerebyx [fosphenytoin] Itching    Amoxil [amoxicillin] Hives       Actual Body Weight:   69.4 kg (153 lb)    Renal Function:   Estimated Creatinine Clearance: 124.8 mL/min (based on SCr of 0.7 mg/dL).,     Dialysis Method (if applicable):  N/A    CBC (last 72 hours):  Recent Labs   Lab Result Units 11/18/22  1340 11/18/22  1528 11/19/22  0434   WBC K/uL 13.82*  --  9.17   Hemoglobin g/dL 15.7  --  12.6   Hemoglobin A1C %  --  13.3*  --    Hematocrit % 48.3  --  37.4   Platelets K/uL 377  --  297   Gran % % 83.1*  --  74.8*   Lymph % % 10.6*  --  15.4*   Mono % % 4.8  --  7.6   Eosinophil % % 0.2  --  1.1   Basophil % % 0.4  --  0.4   Differential Method  Automated  --  Automated       Metabolic Panel (last 72 hours):  Recent Labs   Lab Result Units 11/18/22  1340 11/18/22  1404 11/18/22  1528 11/18/22  1844 11/18/22  2246 11/19/22  0434 11/19/22  0821 11/19/22  1047 11/19/22  1500   Sodium mmol/L 134*  --  133* 132* 133*  134* 134* 134* 135*   Potassium mmol/L 3.6  --  3.6 3.7 3.6 3.2* 3.3* 3.5 3.6   Chloride mmol/L 103  --  109 106 109 108 108 108 106   CO2 mmol/L 8*  --  10* 12* 12* 13* 15* 15* 15*   Glucose mg/dL 348*  --  253* 198* 214* 248* 260* 262* 265*   Glucose, UA   --  3+*  --   --   --   --   --   --   --    BUN mg/dL 7  --  7 6 5* 4* 3* 3* 2*   Creatinine mg/dL 1.1  --  0.8 0.9 0.7 0.7 0.7 0.7 0.7   Albumin g/dL 3.8  --   --   --   --   --   --   --   --    Total Bilirubin mg/dL 0.3  --   --   --   --   --   --   --   --    Alkaline Phosphatase U/L 151*  --   --   --   --   --   --   --   --    AST U/L 8*  --   --   --   --   --   --   --   --    ALT U/L 9*  --   --   --   --   --   --   --   --    Magnesium mg/dL  --   --   --   --   --  1.5*  --   --   --    Phosphorus mg/dL  --   --   --   --   --  1.8*  --   --   --        Vancomycin Administrations:  vancomycin given in the last 96 hours                     vancomycin 1.25 g in dextrose 5% 250 mL IVPB (ready to mix) (mg) 1,250 mg New Bag 11/19/22 0503     1,250 mg New Bag 11/18/22 1702                    Microbiologic Results:  Microbiology Results (last 7 days)       Procedure Component Value Units Date/Time    Blood culture [[id number]] Collected: 11/18/22 1844    Order Status: Completed Specimen: Blood from Antecubital, Left Updated: 11/19/22 0715     Blood Culture, Routine No Growth to date    Blood culture [[id number]] Collected: 11/18/22 1844    Order Status: Completed Specimen: Blood from Antecubital, Right Arm Updated: 11/19/22 0715     Blood Culture, Routine No Growth to date    Culture, MRSA [[id number]] Collected: 11/18/22 2134    Order Status: Sent Specimen: MRSA source from Nares, Left Updated: 11/19/22 0053    MRSA Screen by PCR [[id number]] Collected: 11/18/22 1938    Order Status: Sent Specimen: Nasopharyngeal Swab from Nasal

## 2022-11-19 NOTE — ASSESSMENT & PLAN NOTE
Patient's FSGs are uncontrolled due to hyperglycemia on current medication regimen.  Last A1c reviewed-   Lab Results   Component Value Date    HGBA1C 13.3 (H) 11/18/2022     Most recent fingerstick glucose reviewed-   Recent Labs   Lab 11/19/22  0606 11/19/22  0708 11/19/22  0804 11/19/22  0905   POCTGLUCOSE 242* 256* 222* 214*     Current correctional scale  Medium  Increase anti-hyperglycemic dose as follows-   Antihyperglycemics (From admission, onward)    Start     Stop Route Frequency Ordered    11/18/22 1615  insulin regular 1 Units/mL in sodium chloride 0.9% 100 mL infusion        Question Answer Comment   Insulin Rate Adjustment (DO NOT MODIFY ANSWER) \\[email or web address]\epic\Images\Pharmacy\InsulinInfusions\InsulinDKA [id number].pdf    Enter initial dose from Infusion Protocol Chart (Units/hr): 2.5        -- IV Continuous 11/18/22 1515        Hold Oral hypoglycemics while patient is in the hospital.    Once AG closes and CO2 increases closer to 18-20, dc insulin infusion; switch IVF and resume diabetic diet.  Starting diabetic clear liquid diet now.  HA1c 13.3  Home regimen is LEvemir + novolog + SSI

## 2022-11-19 NOTE — PLAN OF CARE
11/18/22 1923   Patient Assessment/Suction   Level of Consciousness (AVPU) alert   Respiratory Effort Normal;Unlabored   Rhythm/Pattern, Respiratory pattern regular   PRE-TX-O2   O2 Device (Oxygen Therapy) room air   SpO2 96 %   Pulse Oximetry Type Intermittent

## 2022-11-19 NOTE — PROGRESS NOTES
[facility] Hospital Medicine  Progress Note    Patient Name: Adore Estes  MRN: [id number]  Patient Class: IP- Inpatient   Admission Date: 11/18/2022  Length of Stay: 0 days  Attending Physician: Ruth Jaimes MD  Primary Care Provider: Pascual Marie MD        Subjective:     Principal Problem:Type 1 diabetes mellitus with ketoacidosis without coma        HPI:  31 year female presented to ED with nausea and face swelling and pain around both eyes. She started with cellulitis in scalp that progressed to forehead. She did not tolerate bactrim and came to ED for an evaluation as the cellulitis progressed with more swelling. She was switched to doxycyline a few days ago and cellulitis still did not improve. With worsening infection, she became nauseated and now in DKA. On workup: WBC 12,000 with a left shift, ESR 43, CRP 85, Beta-hydroxy 5.3, serum CO2 8 Glucose 348, AG 23.  She was admitted on the DKA pathway. Given NS IVF and insulin drip. NPO.    MRSA screen pending. 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 Vancomycin IV started. Blood cultures pending.       Overview/Hospital Course:  No notes on file    Interval History:  Patient is reporting improvement in facial pain redness and swelling.  Currently on intravenous insulin infusion.  Presently NPO.  Denies any abdominal pain, nausea and vomiting.  Complaining of hunger.  Currently afebrile.  No chest pain or shortness of breath reported.    Review of Systems   Constitutional:  Positive for activity change and appetite change. Negative for chills, diaphoresis and fever.   HENT:  Positive for facial swelling.    Eyes:  Positive for pain, redness and itching.   Respiratory: Negative.     Cardiovascular: Negative.    Gastrointestinal:  Positive for abdominal pain, nausea and vomiting.   Genitourinary: Negative.    Musculoskeletal: Negative.    Skin:  Positive for rash.   Neurological: Negative.    Psychiatric/Behavioral: Negative.      Objective:     Vital Signs (Most Recent):  Temp: 98.1 °F (36.7 °C) (11/19/22 0739)  Pulse: 79 (11/19/22 0739)  Resp: 18 (11/19/22 0739)  BP: 114/71 (11/19/22 0739)  SpO2: 97 % (11/19/22 0739) Vital Signs (24h Range):  Temp:  [98 °F (36.7 °C)-98.6 °F (37 °C)] 98.1 °F (36.7 °C)  Pulse:  [] 79  Resp:  [16-18] 18  SpO2:  [96 %-100 %] 97 %  BP: (109-117)/(63-81) 114/71     Weight: 69.4 kg (153 lb) (bed zero'd out)  Body mass index is 21.95 kg/m².    Intake/Output Summary (Last 24 hours) at 11/19/2022 0951  Last data filed at 11/19/2022 0511  Gross per 24 hour   Intake 0 ml   Output 1800 ml   Net -1800 ml      Physical Exam  Constitutional:       General: She is not in acute distress.     Appearance: Normal appearance.   HENT:      Head: Normocephalic and atraumatic.      Nose: Nose normal.      Mouth/Throat:      Mouth: Mucous membranes are dry.   Eyes:      General:         Right eye: No discharge.         Left eye: No discharge.      Extraocular Movements: Extraocular movements intact.      Pupils: Pupils are equal, round, and reactive to light.   Cardiovascular:      Rate and Rhythm: Normal rate and regular rhythm.      Pulses: Normal pulses.      Heart sounds: No murmur heard.  Pulmonary:      Effort: Pulmonary effort is normal.      Breath sounds: Normal breath sounds. No wheezing.   Abdominal:      General: Abdomen is flat. There is no distension.      Palpations: Abdomen is soft.      Tenderness: There is abdominal tenderness.   Musculoskeletal:         General: Normal range of motion.      Cervical back: Normal range of motion and neck supple.   Lymphadenopathy:      Cervical: No cervical adenopathy.   Skin:     General: Skin is warm and dry.      Findings: Erythema present.      Comments: Erythema and edema around both eyes   Neurological:      General: No focal deficit present.      Mental Status: She is alert and oriented to person, place, and time.      Cranial Nerves: No cranial nerve deficit.    Psychiatric:         Mood and Affect: Mood normal.         Behavior: Behavior normal.       Significant Labs: All pertinent labs within the past 24 hours have been reviewed.  CBC:   Recent Labs   Lab 11/18/22  1340 11/19/22  0434   WBC 13.82* 9.17   HGB 15.7 12.6   HCT 48.3 37.4    297     CMP:   Recent Labs   Lab 11/18/22  1340 11/18/22  1528 11/18/22  2246 11/19/22  0434 11/19/22  0821   *   < > 133* 134* 134*   K 3.6   < > 3.6 3.2* 3.3*      < > 109 108 108   CO2 8*   < > 12* 13* 15*   *   < > 214* 248* 260*   BUN 7   < > 5* 4* 3*   CREATININE 1.1   < > 0.7 0.7 0.7   CALCIUM 9.8   < > 8.3* 8.2* 8.3*   PROT 8.8*  --   --   --   --    ALBUMIN 3.8  --   --   --   --    BILITOT 0.3  --   --   --   --    ALKPHOS 151*  --   --   --   --    AST 8*  --   --   --   --    ALT 9*  --   --   --   --    ANIONGAP 23*   < > 12 13 11    < > = values in this interval not displayed.     Lactic Acid:   Recent Labs   Lab 11/18/22  1528   LACTATE 0.7     POCT Glucose:   Recent Labs   Lab 11/19/22  0708 11/19/22  0804 11/19/22  0905   POCTGLUCOSE 256* 222* 214*     TSH:   Recent Labs   Lab 11/18/22  1528   TSH 0.699     Urine Studies:   Recent Labs   Lab 11/18/22  1404   COLORU Yellow   APPEARANCEUA Clear   PHUR 6.0   SPECGRAV >=1.030*   PROTEINUA Trace*   GLUCUA 3+*   KETONESU 3+*   BILIRUBINUA Negative   OCCULTUA Trace*   NITRITE Negative   UROBILINOGEN Negative   LEUKOCYTESUR Negative   RBCUA 0   BACTERIA None     Microbiology Results (last 7 days)       Procedure Component Value Units Date/Time    Blood culture [[id number]] Collected: 11/18/22 1847    Order Status: Completed Specimen: Blood from Antecubital, Left Updated: 11/19/22 0715     Blood Culture, Routine No Growth to date    Blood culture [[id number]] Collected: 11/18/22 1844    Order Status: Completed Specimen: Blood from Antecubital, Right Arm Updated: 11/19/22 0715     Blood Culture, Routine No Growth to date    Culture, MRSA [[id number]]  Collected: 11/18/22 2134    Order Status: Sent Specimen: MRSA source from Nares, Left Updated: 11/19/22 0053    MRSA Screen by PCR [[id number]] Collected: 11/18/22 1938    Order Status: Sent Specimen: Nasopharyngeal Swab from Nasal           Significant Imaging: None      Assessment/Plan:      * Type 1 diabetes mellitus with ketoacidosis without coma  Patient's FSGs are uncontrolled due to hyperglycemia on current medication regimen.  Last A1c reviewed-   Lab Results   Component Value Date    HGBA1C 13.3 (H) 11/18/2022     Most recent fingerstick glucose reviewed-   Recent Labs   Lab 11/19/22  0606 11/19/22  0708 11/19/22  0804 11/19/22  0905   POCTGLUCOSE 242* 256* 222* 214*     Current correctional scale  Medium  Increase anti-hyperglycemic dose as follows-   Antihyperglycemics (From admission, onward)    Start     Stop Route Frequency Ordered    11/18/22 1615  insulin regular 1 Units/mL in sodium chloride 0.9% 100 mL infusion        Question Answer Comment   Insulin Rate Adjustment (DO NOT MODIFY ANSWER) [email or web address]\[email or web address]\Images\[email or web address]\InsulinInfusions\InsulinDKA [id number].pdf    Enter initial dose from Infusion Protocol Chart (Units/hr): 2.5        -- IV Continuous 11/18/22 1515        Hold Oral hypoglycemics while patient is in the hospital.    Once AG closes and CO2 increases closer to 18-20, dc insulin infusion; switch IVF and resume diabetic diet.  Starting diabetic clear liquid diet now.  HA1c 13.3  Home regimen is LEvemir + novolog + SSI     Hypophosphatemia  Replete phosphorus.  Follow daily phosphorus level.      Hypomagnesemia  Replete magnesium.  Follow daily magnesium level.      Hypokalemia  Replete potassium chloride.  Follow daily BMP.      Facial cellulitis  Due to her h/o MRSA- will screen on admit  Vancomycin IV - pharmacy to dose   Blood culture       High anion gap metabolic acidosis    Secondary to DKA/water deficit   Aggressive IVF         VTE Risk Mitigation (From admission, onward)          Ordered     enoxaparin injection 40 mg  Daily         11/18/22 1515     IP VTE HIGH RISK PATIENT  Once         11/18/22 1515     Place KIANA hose  Until discontinued         11/18/22 1515     Place sequential compression device  Until discontinued         11/18/22 1515                Discharge Planning   TRICE:  11/21/22    Code Status: Full Code   Is the patient medically ready for discharge?:     Reason for patient still in hospital (select all that apply): Patient trending condition  Discharge Plan A: Home                  Cyndie Lloyd MD  Department of Hospital Medicine   [facility]

## 2022-11-19 NOTE — SUBJECTIVE & OBJECTIVE
Interval History:  Patient is reporting improvement in facial pain redness and swelling.  Currently on intravenous insulin infusion.  Presently NPO.  Denies any abdominal pain, nausea and vomiting.  Complaining of hunger.  Currently afebrile.  No chest pain or shortness of breath reported.    Review of Systems   Constitutional:  Positive for activity change and appetite change. Negative for chills, diaphoresis and fever.   HENT:  Positive for facial swelling.    Eyes:  Positive for pain, redness and itching.   Respiratory: Negative.     Cardiovascular: Negative.    Gastrointestinal:  Positive for abdominal pain, nausea and vomiting.   Genitourinary: Negative.    Musculoskeletal: Negative.    Skin:  Positive for rash.   Neurological: Negative.    Psychiatric/Behavioral: Negative.     Objective:     Vital Signs (Most Recent):  Temp: 98.1 °F (36.7 °C) (11/19/22 0739)  Pulse: 79 (11/19/22 0739)  Resp: 18 (11/19/22 0739)  BP: 114/71 (11/19/22 0739)  SpO2: 97 % (11/19/22 0739) Vital Signs (24h Range):  Temp:  [98 °F (36.7 °C)-98.6 °F (37 °C)] 98.1 °F (36.7 °C)  Pulse:  [] 79  Resp:  [16-18] 18  SpO2:  [96 %-100 %] 97 %  BP: (109-117)/(63-81) 114/71     Weight: 69.4 kg (153 lb) (bed zero'd out)  Body mass index is 21.95 kg/m².    Intake/Output Summary (Last 24 hours) at 11/19/2022 0951  Last data filed at 11/19/2022 0511  Gross per 24 hour   Intake 0 ml   Output 1800 ml   Net -1800 ml      Physical Exam  Constitutional:       General: She is not in acute distress.     Appearance: Normal appearance.   HENT:      Head: Normocephalic and atraumatic.      Nose: Nose normal.      Mouth/Throat:      Mouth: Mucous membranes are dry.   Eyes:      General:         Right eye: No discharge.         Left eye: No discharge.      Extraocular Movements: Extraocular movements intact.      Pupils: Pupils are equal, round, and reactive to light.   Cardiovascular:      Rate and Rhythm: Normal rate and regular rhythm.      Pulses:  Normal pulses.      Heart sounds: No murmur heard.  Pulmonary:      Effort: Pulmonary effort is normal.      Breath sounds: Normal breath sounds. No wheezing.   Abdominal:      General: Abdomen is flat. There is no distension.      Palpations: Abdomen is soft.      Tenderness: There is abdominal tenderness.   Musculoskeletal:         General: Normal range of motion.      Cervical back: Normal range of motion and neck supple.   Lymphadenopathy:      Cervical: No cervical adenopathy.   Skin:     General: Skin is warm and dry.      Findings: Erythema present.      Comments: Erythema and edema around both eyes   Neurological:      General: No focal deficit present.      Mental Status: She is alert and oriented to person, place, and time.      Cranial Nerves: No cranial nerve deficit.   Psychiatric:         Mood and Affect: Mood normal.         Behavior: Behavior normal.       Significant Labs: All pertinent labs within the past 24 hours have been reviewed.  CBC:   Recent Labs   Lab 11/18/22  1340 11/19/22  0434   WBC 13.82* 9.17   HGB 15.7 12.6   HCT 48.3 37.4    297     CMP:   Recent Labs   Lab 11/18/22  1340 11/18/22  1528 11/18/22  2246 11/19/22  0434 11/19/22  0821   *   < > 133* 134* 134*   K 3.6   < > 3.6 3.2* 3.3*      < > 109 108 108   CO2 8*   < > 12* 13* 15*   *   < > 214* 248* 260*   BUN 7   < > 5* 4* 3*   CREATININE 1.1   < > 0.7 0.7 0.7   CALCIUM 9.8   < > 8.3* 8.2* 8.3*   PROT 8.8*  --   --   --   --    ALBUMIN 3.8  --   --   --   --    BILITOT 0.3  --   --   --   --    ALKPHOS 151*  --   --   --   --    AST 8*  --   --   --   --    ALT 9*  --   --   --   --    ANIONGAP 23*   < > 12 13 11    < > = values in this interval not displayed.     Lactic Acid:   Recent Labs   Lab 11/18/22  1528   LACTATE 0.7     POCT Glucose:   Recent Labs   Lab 11/19/22  0708 11/19/22  0804 11/19/22  0905   POCTGLUCOSE 256* 222* 214*     TSH:   Recent Labs   Lab 11/18/22  1528   TSH 0.699     Urine  Studies:   Recent Labs   Lab 11/18/22  1404   COLORU Yellow   APPEARANCEUA Clear   PHUR 6.0   SPECGRAV >=1.030*   PROTEINUA Trace*   GLUCUA 3+*   KETONESU 3+*   BILIRUBINUA Negative   OCCULTUA Trace*   NITRITE Negative   UROBILINOGEN Negative   LEUKOCYTESUR Negative   RBCUA 0   BACTERIA None     Microbiology Results (last 7 days)       Procedure Component Value Units Date/Time    Blood culture [[id number]] Collected: 11/18/22 1844    Order Status: Completed Specimen: Blood from Antecubital, Left Updated: 11/19/22 0715     Blood Culture, Routine No Growth to date    Blood culture [[id number]] Collected: 11/18/22 1844    Order Status: Completed Specimen: Blood from Antecubital, Right Arm Updated: 11/19/22 0715     Blood Culture, Routine No Growth to date    Culture, MRSA [[id number]] Collected: 11/18/22 2134    Order Status: Sent Specimen: MRSA source from Nares, Left Updated: 11/19/22 0053    MRSA Screen by PCR [[id number]] Collected: 11/18/22 1938    Order Status: Sent Specimen: Nasopharyngeal Swab from Nasal           Significant Imaging: None

## 2022-11-19 NOTE — PLAN OF CARE
[facility]  Initial Discharge Assessment       Primary Care Provider: Pascual Marie MD    Admission Diagnosis: Diabetic ketoacidosis without coma associated with type 1 diabetes mellitus [E10.10]    Admission Date: 11/18/2022  Expected Discharge Date:     Discharge Barriers Identified: None    Payor: MEDICAID / Plan: [employer] [facility] / Product Type: Managed Medicaid /     Extended Emergency Contact Information  Primary Emergency Contact: Dee Godinez  Address: [address] [place] [address] [place] [address] [facility]  Home Phone: [phone]  Mobile Phone: [phone]  Relation: Mother    Discharge Plan A: Home  Discharge Plan B: Home with family      [id number] - [place] - [address] [facility]  [address]  [place] [address]  Phone: [phone] Fax: [phone]    SW met with patient at bedside to complete discharge planning assessment.  Patient alert and oriented xs 4.  Patient verified all demographic information on facesheet is correct.  Patient verified PCP is Dr. Marie.  Patient verified primary health insurance is [employer] ([place] Medicaid).  Patient with NO home health or DME.  Patient with NO POA or Living Will.  Patient not on dialysis or medication coumadin.  Patient with no 30 day admission.  Patient with no financial issues at this time.  Patient family will provide transportation upon discharge from facility.  Patient independent with ADLs, live with parents and siblings, drives self.      Initial Assessment (most recent)       Adult Discharge Assessment - 11/19/22 1204          Discharge Assessment    Assessment Type Discharge Planning Assessment     Confirmed/corrected address, phone number and insurance Yes     Confirmed Demographics Correct on Facesheet     Source of Information patient     Communicated TRICE with patient/caregiver Date not available/Unable to determine      Lives With parent(s);sibling(s)     Facility Arrived From: home     Do you expect to return to your current living situation? Yes     Do you have help at home or someone to help you manage your care at home? Yes     Who are your caregiver(s) and their phone number(s)? mother     Prior to hospitilization cognitive status: Alert/Oriented     Current cognitive status: Alert/Oriented     Walking or Climbing Stairs Difficulty none     Dressing/Bathing Difficulty none     Equipment Currently Used at Home none     Readmission within 30 days? No     Patient currently being followed by outpatient case management? No     Do you currently have service(s) that help you manage your care at home? No     Do you take prescription medications? Yes     Do you have prescription coverage? Yes     Do you have any problems affording any of your prescribed medications? No     Is the patient taking medications as prescribed? yes     Who is going to help you get home at discharge? family     How do you get to doctors appointments? car, drives self     Are you on dialysis? No     Do you take coumadin? No     Discharge Plan A Home     Discharge Plan B Home with family     DME Needed Upon Discharge  none     Discharge Plan discussed with: Patient     Discharge Barriers Identified None        Physical Activity    On average, how many days per week do you engage in moderate to strenuous exercise (like a brisk walk)? Patient refused     On average, how many minutes do you engage in exercise at this level? Patient refused        Financial Resource Strain    How hard is it for you to pay for the very basics like food, housing, medical care, and heating? Patient refused        Housing Stability    In the last 12 months, was there a time when you were not able to pay the mortgage or rent on time? Patient refused     In the last 12 months, was there a time when you did not have a steady place to sleep or slept in a shelter (including now)? Patient  refused        Transportation Needs    In the past 12 months, has lack of transportation kept you from medical appointments or from getting medications? Patient refused     In the past 12 months, has lack of transportation kept you from meetings, work, or from getting things needed for daily living? Patient refused        Food Insecurity    Within the past 12 months, you worried that your food would run out before you got the money to buy more. Patient refused     Within the past 12 months, the food you bought just didn't last and you didn't have money to get more. Patient refused        Stress    Do you feel stress - tense, restless, nervous, or anxious, or unable to sleep at night because your mind is troubled all the time - these days? Patient refused        Social Connections    In a typical week, how many times do you talk on the phone with family, friends, or neighbors? Patient refused     How often do you get together with friends or relatives? Patient refused     How often do you attend Tenriism or Druze services? Patient refused     Do you belong to any clubs or organizations such as Tenriism groups, unions, fraternal or athletic groups, or school groups? Patient refused     How often do you attend meetings of the clubs or organizations you belong to? Patient refused     Are you , , , , never , or living with a partner? Patient refused        Alcohol Use    Q1: How often do you have a drink containing alcohol? Patient refused     Q2: How many drinks containing alcohol do you have on a typical day when you are drinking? Patient refused     Q3: How often do you have six or more drinks on one occasion? Patient refused

## 2022-11-19 NOTE — CONSULTS
Nutrition-Related Diabetes Education      Time Spent: N/A    Learners: N/A    Current HbA1c: 13.3    Is patient aware of their A1c and their goal A1c?       ___N/A____ yes    Home diabetes medication(s): N/A    Nutrition Education with handouts:   Carbohydrate Counting Diet, Diabetic Meal Planning, Low Carbohydrate diet.    Comments: RD covering remotely. RD unsuccessful with contacting pt via room telephone. RD attached consistent carbohydrate diet education to pt discharge paper work. On- Site RD will provide pt education at RD follow up.       Barriers to Learning: N/A    Follow up:   11/22/22    Please consult as needed.  Thank you!   Rodrigo Giraldo, Registration Eligible, Provisional LDN

## 2022-11-20 LAB
ANION GAP SERPL CALC-SCNC: 10 MMOL/L (ref 8–16)
ANION GAP SERPL CALC-SCNC: 12 MMOL/L (ref 8–16)
ANION GAP SERPL CALC-SCNC: 13 MMOL/L (ref 8–16)
ANION GAP SERPL CALC-SCNC: 13 MMOL/L (ref 8–16)
ANION GAP SERPL CALC-SCNC: 16 MMOL/L (ref 8–16)
B-OH-BUTYR BLD STRIP-SCNC: 1.9 MMOL/L (ref 0–0.5)
BASOPHILS # BLD AUTO: 0.03 K/UL (ref 0–0.2)
BASOPHILS NFR BLD: 0.4 % (ref 0–1.9)
BUN SERPL-MCNC: 2 MG/DL (ref 6–20)
BUN SERPL-MCNC: 3 MG/DL (ref 6–20)
BUN SERPL-MCNC: <2 MG/DL (ref 6–20)
CALCIUM SERPL-MCNC: 8 MG/DL (ref 8.7–10.5)
CALCIUM SERPL-MCNC: 8.3 MG/DL (ref 8.7–10.5)
CALCIUM SERPL-MCNC: 8.3 MG/DL (ref 8.7–10.5)
CALCIUM SERPL-MCNC: 8.4 MG/DL (ref 8.7–10.5)
CALCIUM SERPL-MCNC: 8.4 MG/DL (ref 8.7–10.5)
CHLORIDE SERPL-SCNC: 102 MMOL/L (ref 95–110)
CHLORIDE SERPL-SCNC: 102 MMOL/L (ref 95–110)
CHLORIDE SERPL-SCNC: 103 MMOL/L (ref 95–110)
CHLORIDE SERPL-SCNC: 104 MMOL/L (ref 95–110)
CHLORIDE SERPL-SCNC: 105 MMOL/L (ref 95–110)
CO2 SERPL-SCNC: 16 MMOL/L (ref 23–29)
CO2 SERPL-SCNC: 17 MMOL/L (ref 23–29)
CO2 SERPL-SCNC: 20 MMOL/L (ref 23–29)
CO2 SERPL-SCNC: 20 MMOL/L (ref 23–29)
CO2 SERPL-SCNC: 21 MMOL/L (ref 23–29)
CREAT SERPL-MCNC: 0.5 MG/DL (ref 0.5–1.4)
CREAT SERPL-MCNC: 0.5 MG/DL (ref 0.5–1.4)
CREAT SERPL-MCNC: 0.6 MG/DL (ref 0.5–1.4)
CREAT SERPL-MCNC: 0.6 MG/DL (ref 0.5–1.4)
CREAT SERPL-MCNC: 0.8 MG/DL (ref 0.5–1.4)
DIFFERENTIAL METHOD: ABNORMAL
EOSINOPHIL # BLD AUTO: 0.1 K/UL (ref 0–0.5)
EOSINOPHIL NFR BLD: 1.3 % (ref 0–8)
ERYTHROCYTE [DISTWIDTH] IN BLOOD BY AUTOMATED COUNT: 11.5 % (ref 11.5–14.5)
EST. GFR  (NO RACE VARIABLE): >60 ML/MIN/1.73 M^2
GLUCOSE SERPL-MCNC: 227 MG/DL (ref 70–110)
GLUCOSE SERPL-MCNC: 273 MG/DL (ref 70–110)
GLUCOSE SERPL-MCNC: 286 MG/DL (ref 70–110)
GLUCOSE SERPL-MCNC: 288 MG/DL (ref 70–110)
GLUCOSE SERPL-MCNC: 352 MG/DL (ref 70–110)
HCT VFR BLD AUTO: 34.9 % (ref 37–48.5)
HGB BLD-MCNC: 11.9 G/DL (ref 12–16)
IMM GRANULOCYTES # BLD AUTO: 0.05 K/UL (ref 0–0.04)
IMM GRANULOCYTES NFR BLD AUTO: 0.6 % (ref 0–0.5)
LYMPHOCYTES # BLD AUTO: 1.4 K/UL (ref 1–4.8)
LYMPHOCYTES NFR BLD: 16.2 % (ref 18–48)
MAGNESIUM SERPL-MCNC: 1.2 MG/DL (ref 1.6–2.6)
MCH RBC QN AUTO: 29.2 PG (ref 27–31)
MCHC RBC AUTO-ENTMCNC: 34.1 G/DL (ref 32–36)
MCV RBC AUTO: 86 FL (ref 82–98)
MONOCYTES # BLD AUTO: 0.9 K/UL (ref 0.3–1)
MONOCYTES NFR BLD: 10.2 % (ref 4–15)
MRSA SPEC QL CULT: NORMAL
NEUTROPHILS # BLD AUTO: 5.9 K/UL (ref 1.8–7.7)
NEUTROPHILS NFR BLD: 71.3 % (ref 38–73)
NRBC BLD-RTO: 0 /100 WBC
PHOSPHATE SERPL-MCNC: 2.1 MG/DL (ref 2.7–4.5)
PLATELET # BLD AUTO: 276 K/UL (ref 150–450)
PMV BLD AUTO: 9.1 FL (ref 9.2–12.9)
POCT GLUCOSE: 193 MG/DL (ref 70–110)
POCT GLUCOSE: 195 MG/DL (ref 70–110)
POCT GLUCOSE: 214 MG/DL (ref 70–110)
POCT GLUCOSE: 217 MG/DL (ref 70–110)
POCT GLUCOSE: 223 MG/DL (ref 70–110)
POCT GLUCOSE: 225 MG/DL (ref 70–110)
POCT GLUCOSE: 227 MG/DL (ref 70–110)
POCT GLUCOSE: 228 MG/DL (ref 70–110)
POCT GLUCOSE: 236 MG/DL (ref 70–110)
POCT GLUCOSE: 236 MG/DL (ref 70–110)
POCT GLUCOSE: 244 MG/DL (ref 70–110)
POCT GLUCOSE: 248 MG/DL (ref 70–110)
POCT GLUCOSE: 250 MG/DL (ref 70–110)
POCT GLUCOSE: 251 MG/DL (ref 70–110)
POCT GLUCOSE: 261 MG/DL (ref 70–110)
POCT GLUCOSE: 262 MG/DL (ref 70–110)
POCT GLUCOSE: 265 MG/DL (ref 70–110)
POCT GLUCOSE: 265 MG/DL (ref 70–110)
POCT GLUCOSE: 268 MG/DL (ref 70–110)
POCT GLUCOSE: 271 MG/DL (ref 70–110)
POCT GLUCOSE: 279 MG/DL (ref 70–110)
POCT GLUCOSE: 282 MG/DL (ref 70–110)
POCT GLUCOSE: 286 MG/DL (ref 70–110)
POCT GLUCOSE: 288 MG/DL (ref 70–110)
POCT GLUCOSE: 293 MG/DL (ref 70–110)
POCT GLUCOSE: 319 MG/DL (ref 70–110)
POCT GLUCOSE: 330 MG/DL (ref 70–110)
POTASSIUM SERPL-SCNC: 3.3 MMOL/L (ref 3.5–5.1)
POTASSIUM SERPL-SCNC: 3.5 MMOL/L (ref 3.5–5.1)
POTASSIUM SERPL-SCNC: 3.6 MMOL/L (ref 3.5–5.1)
RBC # BLD AUTO: 4.08 M/UL (ref 4–5.4)
SODIUM SERPL-SCNC: 134 MMOL/L (ref 136–145)
SODIUM SERPL-SCNC: 134 MMOL/L (ref 136–145)
SODIUM SERPL-SCNC: 135 MMOL/L (ref 136–145)
SODIUM SERPL-SCNC: 135 MMOL/L (ref 136–145)
SODIUM SERPL-SCNC: 136 MMOL/L (ref 136–145)
VANCOMYCIN TROUGH SERPL-MCNC: 3.9 UG/ML (ref 10–22)
WBC # BLD AUTO: 8.33 K/UL (ref 3.9–12.7)

## 2022-11-20 PROCEDURE — 63600175 PHARM REV CODE 636 W HCPCS: Performed by: HOSPITALIST

## 2022-11-20 PROCEDURE — 83735 ASSAY OF MAGNESIUM: CPT | Performed by: HOSPITALIST

## 2022-11-20 PROCEDURE — 94761 N-INVAS EAR/PLS OXIMETRY MLT: CPT

## 2022-11-20 PROCEDURE — S5010 5% DEXTROSE AND 0.45% SALINE: HCPCS | Performed by: NURSE PRACTITIONER

## 2022-11-20 PROCEDURE — 80048 BASIC METABOLIC PNL TOTAL CA: CPT | Mod: 91 | Performed by: HOSPITALIST

## 2022-11-20 PROCEDURE — 25000003 PHARM REV CODE 250: Performed by: HOSPITALIST

## 2022-11-20 PROCEDURE — 25000003 PHARM REV CODE 250: Performed by: NURSE PRACTITIONER

## 2022-11-20 PROCEDURE — 63600175 PHARM REV CODE 636 W HCPCS: Performed by: NURSE PRACTITIONER

## 2022-11-20 PROCEDURE — 12000002 HC ACUTE/MED SURGE SEMI-PRIVATE ROOM

## 2022-11-20 PROCEDURE — 82010 KETONE BODYS QUAN: CPT | Performed by: INTERNAL MEDICINE

## 2022-11-20 PROCEDURE — 36415 COLL VENOUS BLD VENIPUNCTURE: CPT | Performed by: HOSPITALIST

## 2022-11-20 PROCEDURE — 84100 ASSAY OF PHOSPHORUS: CPT | Performed by: HOSPITALIST

## 2022-11-20 PROCEDURE — 85025 COMPLETE CBC W/AUTO DIFF WBC: CPT | Performed by: HOSPITALIST

## 2022-11-20 PROCEDURE — 80202 ASSAY OF VANCOMYCIN: CPT | Performed by: HOSPITALIST

## 2022-11-20 PROCEDURE — 36415 COLL VENOUS BLD VENIPUNCTURE: CPT | Performed by: INTERNAL MEDICINE

## 2022-11-20 RX ORDER — MAGNESIUM SULFATE HEPTAHYDRATE 40 MG/ML
2 INJECTION, SOLUTION INTRAVENOUS ONCE
Status: COMPLETED | OUTPATIENT
Start: 2022-11-20 | End: 2022-11-20

## 2022-11-20 RX ADMIN — VANCOMYCIN HYDROCHLORIDE 1250 MG: 1.25 INJECTION, POWDER, LYOPHILIZED, FOR SOLUTION INTRAVENOUS at 04:11

## 2022-11-20 RX ADMIN — ENOXAPARIN SODIUM 40 MG: 40 INJECTION SUBCUTANEOUS at 04:11

## 2022-11-20 RX ADMIN — DEXTROSE AND SODIUM CHLORIDE: 5; .45 INJECTION, SOLUTION INTRAVENOUS at 04:11

## 2022-11-20 RX ADMIN — DEXTROSE AND SODIUM CHLORIDE: 5; .45 INJECTION, SOLUTION INTRAVENOUS at 12:11

## 2022-11-20 RX ADMIN — VANCOMYCIN HYDROCHLORIDE 1500 MG: 1.5 INJECTION, POWDER, LYOPHILIZED, FOR SOLUTION INTRAVENOUS at 04:11

## 2022-11-20 RX ADMIN — SODIUM CHLORIDE 1.1 UNITS/HR: 9 INJECTION, SOLUTION INTRAVENOUS at 10:11

## 2022-11-20 RX ADMIN — FAMOTIDINE 20 MG: 10 INJECTION, SOLUTION INTRAVENOUS at 08:11

## 2022-11-20 RX ADMIN — POTASSIUM CHLORIDE 40 MEQ: 7.46 INJECTION, SOLUTION INTRAVENOUS at 12:11

## 2022-11-20 RX ADMIN — ACETAMINOPHEN 650 MG: 325 TABLET ORAL at 09:11

## 2022-11-20 RX ADMIN — MAGNESIUM SULFATE 2 G: 2 INJECTION INTRAVENOUS at 10:11

## 2022-11-20 RX ADMIN — SODIUM CHLORIDE 2.9 UNITS/HR: 9 INJECTION, SOLUTION INTRAVENOUS at 11:11

## 2022-11-20 RX ADMIN — POTASSIUM CHLORIDE 10 MEQ: 7.46 INJECTION, SOLUTION INTRAVENOUS at 03:11

## 2022-11-20 RX ADMIN — POTASSIUM CHLORIDE 10 MEQ: 7.46 INJECTION, SOLUTION INTRAVENOUS at 12:11

## 2022-11-20 RX ADMIN — DEXTROSE AND SODIUM CHLORIDE: 5; .45 INJECTION, SOLUTION INTRAVENOUS at 09:11

## 2022-11-20 RX ADMIN — MAGNESIUM SULFATE 2 G: 2 INJECTION INTRAVENOUS at 06:11

## 2022-11-20 NOTE — PLAN OF CARE
Problem: Diabetic Ketoacidosis  Goal: Fluid and Electrolyte Balance with Absence of Ketosis  Outcome: Ongoing, Progressing     Problem: Adult Inpatient Plan of Care  Goal: Plan of Care Review  Outcome: Ongoing, Progressing  Goal: Patient-Specific Goal (Individualized)  Outcome: Ongoing, Progressing  Goal: Absence of Hospital-Acquired Illness or Injury  Outcome: Ongoing, Progressing  Goal: Optimal Comfort and Wellbeing  Outcome: Ongoing, Progressing  Goal: Readiness for Transition of Care  Outcome: Ongoing, Progressing     Problem: Diabetes Comorbidity  Goal: Blood Glucose Level Within Targeted Range  Outcome: Ongoing, Progressing

## 2022-11-20 NOTE — NURSING
Potassium level 3.1 replaced per Potassium standing orders.  0620-magnesium level 1.2 received an order to replacement. Administered magnesium  2g IVPB per order. Labs will be repeated for results.

## 2022-11-20 NOTE — PROGRESS NOTES
[facility] Hospital Medicine  Progress Note    Patient Name: Adore Estes  MRN: [id number]  Patient Class: IP- Inpatient   Admission Date: 11/18/2022  Length of Stay: 1 days  Attending Physician: Ruth Jaimes MD  Primary Care Provider: Pascual Marie MD        Subjective:     Principal Problem:Type 1 diabetes mellitus with ketoacidosis without coma        HPI:  31 year female presented to ED with nausea and face swelling and pain around both eyes. She started with cellulitis in scalp that progressed to forehead. She did not tolerate bactrim and came to ED for an evaluation as the cellulitis progressed with more swelling. She was switched to doxycyline a few days ago and cellulitis still did not improve. With worsening infection, she became nauseated and now in DKA. On workup: WBC 12,000 with a left shift, ESR 43, CRP 85, Beta-hydroxy 5.3, serum CO2 8 Glucose 348, AG 23.  She was admitted on the DKA pathway. Given NS IVF and insulin drip. NPO.    MRSA screen pending. 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 Vancomycin IV started. Blood cultures pending.       Overview/Hospital Course:  No notes on file    Interval History:  Patient is reporting improvement in facial pain redness and swelling.  Currently on intravenous insulin infusion.  Asking to eat solid food. Denies any abdominal pain, nausea and vomiting.  Complaining of hunger.  Currently afebrile.  No chest pain or shortness of breath reported.    Review of Systems   Constitutional:  Positive for activity change and appetite change. Negative for chills, diaphoresis and fever.   HENT:  Positive for facial swelling.    Eyes:  Positive for pain, redness and itching.   Respiratory: Negative.     Cardiovascular: Negative.    Gastrointestinal:  Positive for abdominal pain, nausea and vomiting.   Genitourinary: Negative.    Musculoskeletal: Negative.    Skin:  Positive for rash.   Neurological: Negative.    Psychiatric/Behavioral:  Negative.     Objective:     Vital Signs (Most Recent):  Temp: 97.6 °F (36.4 °C) (11/20/22 0716)  Pulse: 98 (11/20/22 0716)  Resp: 18 (11/20/22 0716)  BP: 131/80 (11/20/22 0716)  SpO2: 99 % (11/20/22 0744) Vital Signs (24h Range):  Temp:  [97.6 °F (36.4 °C)-98.9 °F (37.2 °C)] 97.6 °F (36.4 °C)  Pulse:  [75-99] 98  Resp:  [16-18] 18  SpO2:  [97 %-99 %] 99 %  BP: (109-131)/(68-80) 131/80     Weight: 69.4 kg (153 lb) (bed zero'd out)  Body mass index is 21.95 kg/m².    Intake/Output Summary (Last 24 hours) at 11/20/2022 0969  Last data filed at 11/19/2022 2108  Gross per 24 hour   Intake 300 ml   Output 1125 ml   Net -825 ml        Physical Exam  Constitutional:       General: She is not in acute distress.     Appearance: Normal appearance.   HENT:      Head: Normocephalic and atraumatic.      Nose: Nose normal.      Mouth/Throat:      Mouth: Mucous membranes are dry.   Eyes:      General:         Right eye: No discharge.         Left eye: No discharge.      Extraocular Movements: Extraocular movements intact.      Pupils: Pupils are equal, round, and reactive to light.   Cardiovascular:      Rate and Rhythm: Normal rate and regular rhythm.      Pulses: Normal pulses.      Heart sounds: No murmur heard.  Pulmonary:      Effort: Pulmonary effort is normal.      Breath sounds: Normal breath sounds. No wheezing.   Abdominal:      General: Abdomen is flat. There is no distension.      Palpations: Abdomen is soft.      Tenderness: There is abdominal tenderness.   Musculoskeletal:         General: Normal range of motion.      Cervical back: Normal range of motion and neck supple.   Lymphadenopathy:      Cervical: No cervical adenopathy.   Skin:     General: Skin is warm and dry.      Findings: Erythema present.      Comments: Erythema and edema around both eyes   Neurological:      General: No focal deficit present.      Mental Status: She is alert and oriented to person, place, and time.      Cranial Nerves: No cranial  nerve deficit.   Psychiatric:         Mood and Affect: Mood normal.         Behavior: Behavior normal.       Significant Labs: All pertinent labs within the past 24 hours have been reviewed.  CBC:   Recent Labs   Lab 11/18/22  1340 11/19/22  0434 11/20/22  0647   WBC 13.82* 9.17 8.33   HGB 15.7 12.6 11.9*   HCT 48.3 37.4 34.9*    297 276       CMP:   Recent Labs   Lab 11/18/22  1340 11/18/22  1528 11/19/22  1837 11/19/22  2241 11/20/22  0647   *   < > 134* 133* 136   K 3.6   < > 3.4* 3.1* 3.6      < > 105 103 105   CO2 8*   < > 16* 17* 21*   *   < > 269* 364* 227*   BUN 7   < > 2* <2* <2*   CREATININE 1.1   < > 0.6 0.6 0.5   CALCIUM 9.8   < > 8.5* 8.2* 8.4*   PROT 8.8*  --   --   --   --    ALBUMIN 3.8  --   --   --   --    BILITOT 0.3  --   --   --   --    ALKPHOS 151*  --   --   --   --    AST 8*  --   --   --   --    ALT 9*  --   --   --   --    ANIONGAP 23*   < > 13 13 10    < > = values in this interval not displayed.       Lactic Acid:   Recent Labs   Lab 11/18/22  1528   LACTATE 0.7       POCT Glucose:   Recent Labs   Lab 11/20/22  0707 11/20/22  0808 11/20/22  0916   POCTGLUCOSE 193* 214* 293*       TSH:   Recent Labs   Lab 11/18/22  1528   TSH 0.699       Urine Studies:   Recent Labs   Lab 11/18/22  1404   COLORU Yellow   APPEARANCEUA Clear   PHUR 6.0   SPECGRAV >=1.030*   PROTEINUA Trace*   GLUCUA 3+*   KETONESU 3+*   BILIRUBINUA Negative   OCCULTUA Trace*   NITRITE Negative   UROBILINOGEN Negative   LEUKOCYTESUR Negative   RBCUA 0   BACTERIA None       Microbiology Results (last 7 days)       Procedure Component Value Units Date/Time    Culture, MRSA [[id number]] Collected: 11/18/22 2134    Order Status: Completed Specimen: MRSA source from Nares, Left Updated: 11/20/22 0711     MRSA Surveillance Screen No MRSA isolated    Blood culture [[id number]] Collected: 11/18/22 0254    Order Status: Completed Specimen: Blood from Antecubital, Left Updated: 11/20/22 0613     Blood Culture,  Routine No Growth to date      No Growth to date    Blood culture [[id number]] Collected: 11/18/22 1844    Order Status: Completed Specimen: Blood from Antecubital, Right Arm Updated: 11/20/22 0613     Blood Culture, Routine No Growth to date      No Growth to date    MRSA Screen by PCR [[id number]] Collected: 11/18/22 1938    Order Status: Sent Specimen: Nasopharyngeal Swab from Nasal           Significant Imaging: None      Assessment/Plan:      * Type 1 diabetes mellitus with ketoacidosis without coma  Patient's FSGs are uncontrolled due to hyperglycemia on current medication regimen.  Last A1c reviewed-   Lab Results   Component Value Date    HGBA1C 13.3 (H) 11/18/2022     Most recent fingerstick glucose reviewed-   Recent Labs   Lab 11/19/22  0606 11/19/22  0708 11/19/22  0804 11/19/22  0905   POCTGLUCOSE 242* 256* 222* 214*     Current correctional scale  Medium  Increase anti-hyperglycemic dose as follows-   Antihyperglycemics (From admission, onward)    Start     Stop Route Frequency Ordered    11/18/22 1615  insulin regular 1 Units/mL in sodium chloride 0.9% 100 mL infusion        Question Answer Comment   Insulin Rate Adjustment (DO NOT MODIFY ANSWER) \\[email or web address]    Enter initial dose from Infusion Protocol Chart (Units/hr): 2.5        -- IV Continuous 11/18/22 1515        Hold Oral hypoglycemics while patient is in the hospital.    Once AG closes and CO2 increases closer to 18-20, dc insulin infusion; switch IVF and resume diabetic diet.  Starting diabetic clear liquid diet now.  HA1c 13.3  Home regimen is LEvemir + novolog + SSI     Hypophosphatemia  Replete phosphorus.  Follow daily phosphorus level.      Hypomagnesemia  Replete magnesium.  Follow daily magnesium level.      Hypokalemia  Replete potassium chloride.  Follow daily BMP.      Facial cellulitis  Due to her h/o MRSA- will screen on admit  Vancomycin IV - pharmacy to dose   Blood  culture       High anion gap metabolic acidosis    Secondary to DKA/water deficit   Aggressive IVF       Encourage patient to get out of bed, increase ambulation.  VTE Risk Mitigation (From admission, onward)         Ordered     enoxaparin injection 40 mg  Daily         11/18/22 1515     IP VTE HIGH RISK PATIENT  Once         11/18/22 1515     Place KIANA hose  Until discontinued         11/18/22 1515     Place sequential compression device  Until discontinued         11/18/22 1515                Discharge Planning   TRICE:      Code Status: Full Code   Is the patient medically ready for discharge?:     Reason for patient still in hospital (select all that apply): Patient trending condition  Discharge Plan A: Home                  Cyndie Lloyd MD  Department of Hospital Medicine   [facility]

## 2022-11-20 NOTE — SUBJECTIVE & OBJECTIVE
Interval History:  Patient is reporting improvement in facial pain redness and swelling.  Currently on intravenous insulin infusion.  Asking to eat solid food. Denies any abdominal pain, nausea and vomiting.  Complaining of hunger.  Currently afebrile.  No chest pain or shortness of breath reported.    Review of Systems   Constitutional:  Positive for activity change and appetite change. Negative for chills, diaphoresis and fever.   HENT:  Positive for facial swelling.    Eyes:  Positive for pain, redness and itching.   Respiratory: Negative.     Cardiovascular: Negative.    Gastrointestinal:  Positive for abdominal pain, nausea and vomiting.   Genitourinary: Negative.    Musculoskeletal: Negative.    Skin:  Positive for rash.   Neurological: Negative.    Psychiatric/Behavioral: Negative.     Objective:     Vital Signs (Most Recent):  Temp: 97.6 °F (36.4 °C) (11/20/22 0716)  Pulse: 98 (11/20/22 0716)  Resp: 18 (11/20/22 0716)  BP: 131/80 (11/20/22 0716)  SpO2: 99 % (11/20/22 0744) Vital Signs (24h Range):  Temp:  [97.6 °F (36.4 °C)-98.9 °F (37.2 °C)] 97.6 °F (36.4 °C)  Pulse:  [75-99] 98  Resp:  [16-18] 18  SpO2:  [97 %-99 %] 99 %  BP: (109-131)/(68-80) 131/80     Weight: 69.4 kg (153 lb) (bed zero'd out)  Body mass index is 21.95 kg/m².    Intake/Output Summary (Last 24 hours) at 11/20/2022 0957  Last data filed at 11/19/2022 2108  Gross per 24 hour   Intake 300 ml   Output 1125 ml   Net -825 ml        Physical Exam  Constitutional:       General: She is not in acute distress.     Appearance: Normal appearance.   HENT:      Head: Normocephalic and atraumatic.      Nose: Nose normal.      Mouth/Throat:      Mouth: Mucous membranes are dry.   Eyes:      General:         Right eye: No discharge.         Left eye: No discharge.      Extraocular Movements: Extraocular movements intact.      Pupils: Pupils are equal, round, and reactive to light.   Cardiovascular:      Rate and Rhythm: Normal rate and regular rhythm.       Pulses: Normal pulses.      Heart sounds: No murmur heard.  Pulmonary:      Effort: Pulmonary effort is normal.      Breath sounds: Normal breath sounds. No wheezing.   Abdominal:      General: Abdomen is flat. There is no distension.      Palpations: Abdomen is soft.      Tenderness: There is abdominal tenderness.   Musculoskeletal:         General: Normal range of motion.      Cervical back: Normal range of motion and neck supple.   Lymphadenopathy:      Cervical: No cervical adenopathy.   Skin:     General: Skin is warm and dry.      Findings: Erythema present.      Comments: Erythema and edema around both eyes   Neurological:      General: No focal deficit present.      Mental Status: She is alert and oriented to person, place, and time.      Cranial Nerves: No cranial nerve deficit.   Psychiatric:         Mood and Affect: Mood normal.         Behavior: Behavior normal.       Significant Labs: All pertinent labs within the past 24 hours have been reviewed.  CBC:   Recent Labs   Lab 11/18/22  1340 11/19/22  0434 11/20/22  0647   WBC 13.82* 9.17 8.33   HGB 15.7 12.6 11.9*   HCT 48.3 37.4 34.9*    297 276       CMP:   Recent Labs   Lab 11/18/22  1340 11/18/22  1528 11/19/22  1837 11/19/22  2241 11/20/22  0647   *   < > 134* 133* 136   K 3.6   < > 3.4* 3.1* 3.6      < > 105 103 105   CO2 8*   < > 16* 17* 21*   *   < > 269* 364* 227*   BUN 7   < > 2* <2* <2*   CREATININE 1.1   < > 0.6 0.6 0.5   CALCIUM 9.8   < > 8.5* 8.2* 8.4*   PROT 8.8*  --   --   --   --    ALBUMIN 3.8  --   --   --   --    BILITOT 0.3  --   --   --   --    ALKPHOS 151*  --   --   --   --    AST 8*  --   --   --   --    ALT 9*  --   --   --   --    ANIONGAP 23*   < > 13 13 10    < > = values in this interval not displayed.       Lactic Acid:   Recent Labs   Lab 11/18/22  1528   LACTATE 0.7       POCT Glucose:   Recent Labs   Lab 11/20/22  0707 11/20/22  0808 11/20/22  0916   POCTGLUCOSE 193* 214* 293*       TSH:   Recent  Labs   Lab 11/18/22  1528   TSH 0.699       Urine Studies:   Recent Labs   Lab 11/18/22  1404   COLORU Yellow   APPEARANCEUA Clear   PHUR 6.0   SPECGRAV >=1.030*   PROTEINUA Trace*   GLUCUA 3+*   KETONESU 3+*   BILIRUBINUA Negative   OCCULTUA Trace*   NITRITE Negative   UROBILINOGEN Negative   LEUKOCYTESUR Negative   RBCUA 0   BACTERIA None       Microbiology Results (last 7 days)       Procedure Component Value Units Date/Time    Culture, MRSA [[id number]] Collected: 11/18/22 2134    Order Status: Completed Specimen: MRSA source from Nares, Left Updated: 11/20/22 0711     MRSA Surveillance Screen No MRSA isolated    Blood culture [[id number]] Collected: 11/18/22 1844    Order Status: Completed Specimen: Blood from Antecubital, Left Updated: 11/20/22 0613     Blood Culture, Routine No Growth to date      No Growth to date    Blood culture [[id number]] Collected: 11/18/22 1844    Order Status: Completed Specimen: Blood from Antecubital, Right Arm Updated: 11/20/22 0613     Blood Culture, Routine No Growth to date      No Growth to date    MRSA Screen by PCR [[id number]] Collected: 11/18/22 1938    Order Status: Sent Specimen: Nasopharyngeal Swab from Nasal           Significant Imaging: None

## 2022-11-20 NOTE — PLAN OF CARE
11/19/22 2001   Patient Assessment/Suction   Level of Consciousness (AVPU) alert   Rhythm/Pattern, Respiratory pattern regular   PRE-TX-O2   O2 Device (Oxygen Therapy) room air   SpO2 99 %   Pulse Oximetry Type Intermittent

## 2022-11-20 NOTE — PLAN OF CARE
Plan of care reviewed with pt at beginning of shift.  Pt verbalized understanding. Purposeful rounds completed throughout shift. Ongoing monitoring of s/sx of hyper/hypoglycemia as blood glucose levels are checked per orders. Pain reviewed and will be monitored throughout shift. restroom offered, self-repositioning encouraged. Safety measures maintained.  Patient has remained free from fall/injury, no skin breakdown noted.  Side rails up x2, bed in locked and lowest position, call light kept within reach.        Problem: Adult Inpatient Plan of Care  Goal: Plan of Care Review  Outcome: Ongoing, Progressing  Goal: Patient-Specific Goal (Individualized)  Outcome: Ongoing, Progressing  Goal: Absence of Hospital-Acquired Illness or Injury  Outcome: Ongoing, Progressing  Goal: Optimal Comfort and Wellbeing  Outcome: Ongoing, Progressing  Goal: Readiness for Transition of Care  Outcome: Ongoing, Progressing     Problem: Adult Inpatient Plan of Care  Goal: Patient-Specific Goal (Individualized)  Outcome: Ongoing, Progressing     Problem: Adult Inpatient Plan of Care  Goal: Absence of Hospital-Acquired Illness or Injury  Outcome: Ongoing, Progressing     Problem: Diabetic Ketoacidosis  Goal: Fluid and Electrolyte Balance with Absence of Ketosis  Outcome: Ongoing, Progressing     Problem: Diabetes Comorbidity  Goal: Blood Glucose Level Within Targeted Range  Outcome: Ongoing, Progressing

## 2022-11-21 LAB
ANION GAP SERPL CALC-SCNC: 10 MMOL/L (ref 8–16)
ANION GAP SERPL CALC-SCNC: 10 MMOL/L (ref 8–16)
ANION GAP SERPL CALC-SCNC: 11 MMOL/L (ref 8–16)
ANION GAP SERPL CALC-SCNC: 12 MMOL/L (ref 8–16)
ANION GAP SERPL CALC-SCNC: 8 MMOL/L (ref 8–16)
ANION GAP SERPL CALC-SCNC: 9 MMOL/L (ref 8–16)
B-OH-BUTYR BLD STRIP-SCNC: 1.5 MMOL/L (ref 0–0.5)
BASOPHILS # BLD AUTO: 0.02 K/UL (ref 0–0.2)
BASOPHILS NFR BLD: 0.3 % (ref 0–1.9)
BUN SERPL-MCNC: 2 MG/DL (ref 6–20)
BUN SERPL-MCNC: 3 MG/DL (ref 6–20)
BUN SERPL-MCNC: <2 MG/DL (ref 6–20)
BUN SERPL-MCNC: <2 MG/DL (ref 6–20)
CALCIUM SERPL-MCNC: 8 MG/DL (ref 8.7–10.5)
CALCIUM SERPL-MCNC: 8.2 MG/DL (ref 8.7–10.5)
CALCIUM SERPL-MCNC: 8.5 MG/DL (ref 8.7–10.5)
CALCIUM SERPL-MCNC: 8.6 MG/DL (ref 8.7–10.5)
CALCIUM SERPL-MCNC: 8.7 MG/DL (ref 8.7–10.5)
CALCIUM SERPL-MCNC: 9.1 MG/DL (ref 8.7–10.5)
CHLORIDE SERPL-SCNC: 103 MMOL/L (ref 95–110)
CHLORIDE SERPL-SCNC: 105 MMOL/L (ref 95–110)
CHLORIDE SERPL-SCNC: 105 MMOL/L (ref 95–110)
CO2 SERPL-SCNC: 19 MMOL/L (ref 23–29)
CO2 SERPL-SCNC: 19 MMOL/L (ref 23–29)
CO2 SERPL-SCNC: 23 MMOL/L (ref 23–29)
CO2 SERPL-SCNC: 24 MMOL/L (ref 23–29)
CO2 SERPL-SCNC: 27 MMOL/L (ref 23–29)
CO2 SERPL-SCNC: 27 MMOL/L (ref 23–29)
CREAT SERPL-MCNC: 0.5 MG/DL (ref 0.5–1.4)
CREAT SERPL-MCNC: 0.6 MG/DL (ref 0.5–1.4)
CREAT SERPL-MCNC: 0.6 MG/DL (ref 0.5–1.4)
DIFFERENTIAL METHOD: ABNORMAL
EOSINOPHIL # BLD AUTO: 0.1 K/UL (ref 0–0.5)
EOSINOPHIL NFR BLD: 1.5 % (ref 0–8)
ERYTHROCYTE [DISTWIDTH] IN BLOOD BY AUTOMATED COUNT: 11.6 % (ref 11.5–14.5)
EST. GFR  (NO RACE VARIABLE): >60 ML/MIN/1.73 M^2
GLUCOSE SERPL-MCNC: 216 MG/DL (ref 70–110)
GLUCOSE SERPL-MCNC: 245 MG/DL (ref 70–110)
GLUCOSE SERPL-MCNC: 256 MG/DL (ref 70–110)
GLUCOSE SERPL-MCNC: 260 MG/DL (ref 70–110)
GLUCOSE SERPL-MCNC: 293 MG/DL (ref 70–110)
GLUCOSE SERPL-MCNC: 299 MG/DL (ref 70–110)
HCT VFR BLD AUTO: 32.6 % (ref 37–48.5)
HGB BLD-MCNC: 11.3 G/DL (ref 12–16)
IMM GRANULOCYTES # BLD AUTO: 0.05 K/UL (ref 0–0.04)
IMM GRANULOCYTES NFR BLD AUTO: 0.8 % (ref 0–0.5)
LYMPHOCYTES # BLD AUTO: 1.5 K/UL (ref 1–4.8)
LYMPHOCYTES NFR BLD: 23.6 % (ref 18–48)
MAGNESIUM SERPL-MCNC: 1.7 MG/DL (ref 1.6–2.6)
MCH RBC QN AUTO: 29.5 PG (ref 27–31)
MCHC RBC AUTO-ENTMCNC: 34.7 G/DL (ref 32–36)
MCV RBC AUTO: 85 FL (ref 82–98)
MONOCYTES # BLD AUTO: 0.6 K/UL (ref 0.3–1)
MONOCYTES NFR BLD: 9.3 % (ref 4–15)
NEUTROPHILS # BLD AUTO: 4.2 K/UL (ref 1.8–7.7)
NEUTROPHILS NFR BLD: 64.5 % (ref 38–73)
NRBC BLD-RTO: 0 /100 WBC
PHOSPHATE SERPL-MCNC: 2.7 MG/DL (ref 2.7–4.5)
PLATELET # BLD AUTO: 274 K/UL (ref 150–450)
PMV BLD AUTO: 9.3 FL (ref 9.2–12.9)
POCT GLUCOSE: 198 MG/DL (ref 70–110)
POCT GLUCOSE: 207 MG/DL (ref 70–110)
POCT GLUCOSE: 216 MG/DL (ref 70–110)
POCT GLUCOSE: 222 MG/DL (ref 70–110)
POCT GLUCOSE: 229 MG/DL (ref 70–110)
POCT GLUCOSE: 232 MG/DL (ref 70–110)
POCT GLUCOSE: 236 MG/DL (ref 70–110)
POCT GLUCOSE: 237 MG/DL (ref 70–110)
POCT GLUCOSE: 243 MG/DL (ref 70–110)
POCT GLUCOSE: 244 MG/DL (ref 70–110)
POCT GLUCOSE: 247 MG/DL (ref 70–110)
POCT GLUCOSE: 248 MG/DL (ref 70–110)
POCT GLUCOSE: 257 MG/DL (ref 70–110)
POCT GLUCOSE: 258 MG/DL (ref 70–110)
POCT GLUCOSE: 260 MG/DL (ref 70–110)
POCT GLUCOSE: 261 MG/DL (ref 70–110)
POCT GLUCOSE: 264 MG/DL (ref 70–110)
POCT GLUCOSE: 266 MG/DL (ref 70–110)
POCT GLUCOSE: 266 MG/DL (ref 70–110)
POCT GLUCOSE: 268 MG/DL (ref 70–110)
POCT GLUCOSE: 278 MG/DL (ref 70–110)
POCT GLUCOSE: 279 MG/DL (ref 70–110)
POCT GLUCOSE: 333 MG/DL (ref 70–110)
POTASSIUM SERPL-SCNC: 3.1 MMOL/L (ref 3.5–5.1)
POTASSIUM SERPL-SCNC: 3.2 MMOL/L (ref 3.5–5.1)
POTASSIUM SERPL-SCNC: 3.4 MMOL/L (ref 3.5–5.1)
POTASSIUM SERPL-SCNC: 3.6 MMOL/L (ref 3.5–5.1)
POTASSIUM SERPL-SCNC: 3.6 MMOL/L (ref 3.5–5.1)
POTASSIUM SERPL-SCNC: 4 MMOL/L (ref 3.5–5.1)
RBC # BLD AUTO: 3.83 M/UL (ref 4–5.4)
SODIUM SERPL-SCNC: 135 MMOL/L (ref 136–145)
SODIUM SERPL-SCNC: 136 MMOL/L (ref 136–145)
SODIUM SERPL-SCNC: 136 MMOL/L (ref 136–145)
SODIUM SERPL-SCNC: 137 MMOL/L (ref 136–145)
SODIUM SERPL-SCNC: 138 MMOL/L (ref 136–145)
SODIUM SERPL-SCNC: 139 MMOL/L (ref 136–145)
VANCOMYCIN TROUGH SERPL-MCNC: 7.8 UG/ML (ref 10–22)
WBC # BLD AUTO: 6.53 K/UL (ref 3.9–12.7)

## 2022-11-21 PROCEDURE — 25000003 PHARM REV CODE 250: Performed by: HOSPITALIST

## 2022-11-21 PROCEDURE — 84100 ASSAY OF PHOSPHORUS: CPT | Performed by: HOSPITALIST

## 2022-11-21 PROCEDURE — 25000003 PHARM REV CODE 250: Performed by: NURSE PRACTITIONER

## 2022-11-21 PROCEDURE — 25000003 PHARM REV CODE 250: Performed by: INTERNAL MEDICINE

## 2022-11-21 PROCEDURE — S5010 5% DEXTROSE AND 0.45% SALINE: HCPCS | Performed by: NURSE PRACTITIONER

## 2022-11-21 PROCEDURE — 80048 BASIC METABOLIC PNL TOTAL CA: CPT | Mod: 91 | Performed by: HOSPITALIST

## 2022-11-21 PROCEDURE — 94761 N-INVAS EAR/PLS OXIMETRY MLT: CPT

## 2022-11-21 PROCEDURE — 12000002 HC ACUTE/MED SURGE SEMI-PRIVATE ROOM

## 2022-11-21 PROCEDURE — 82010 KETONE BODYS QUAN: CPT | Performed by: INTERNAL MEDICINE

## 2022-11-21 PROCEDURE — 83735 ASSAY OF MAGNESIUM: CPT | Performed by: HOSPITALIST

## 2022-11-21 PROCEDURE — 80202 ASSAY OF VANCOMYCIN: CPT | Performed by: HOSPITALIST

## 2022-11-21 PROCEDURE — 36415 COLL VENOUS BLD VENIPUNCTURE: CPT | Performed by: INTERNAL MEDICINE

## 2022-11-21 PROCEDURE — 63600175 PHARM REV CODE 636 W HCPCS: Performed by: HOSPITALIST

## 2022-11-21 PROCEDURE — 85025 COMPLETE CBC W/AUTO DIFF WBC: CPT | Performed by: HOSPITALIST

## 2022-11-21 PROCEDURE — 36415 COLL VENOUS BLD VENIPUNCTURE: CPT | Performed by: HOSPITALIST

## 2022-11-21 RX ORDER — POTASSIUM CHLORIDE 20 MEQ/1
40 TABLET, EXTENDED RELEASE ORAL ONCE
Status: COMPLETED | OUTPATIENT
Start: 2022-11-21 | End: 2022-11-21

## 2022-11-21 RX ADMIN — POTASSIUM CHLORIDE 40 MEQ: 1500 TABLET, EXTENDED RELEASE ORAL at 11:11

## 2022-11-21 RX ADMIN — VANCOMYCIN HYDROCHLORIDE 1250 MG: 1.25 INJECTION, POWDER, LYOPHILIZED, FOR SOLUTION INTRAVENOUS at 12:11

## 2022-11-21 RX ADMIN — SODIUM CHLORIDE 1.27 UNITS/HR: 9 INJECTION, SOLUTION INTRAVENOUS at 09:11

## 2022-11-21 RX ADMIN — DEXTROSE AND SODIUM CHLORIDE: 5; .45 INJECTION, SOLUTION INTRAVENOUS at 02:11

## 2022-11-21 RX ADMIN — VANCOMYCIN HYDROCHLORIDE 1500 MG: 1.5 INJECTION, POWDER, LYOPHILIZED, FOR SOLUTION INTRAVENOUS at 11:11

## 2022-11-21 RX ADMIN — DEXTROSE AND SODIUM CHLORIDE: 5; .45 INJECTION, SOLUTION INTRAVENOUS at 08:11

## 2022-11-21 RX ADMIN — DEXTROSE AND SODIUM CHLORIDE: 5; .45 INJECTION, SOLUTION INTRAVENOUS at 06:11

## 2022-11-21 RX ADMIN — ENOXAPARIN SODIUM 40 MG: 40 INJECTION SUBCUTANEOUS at 05:11

## 2022-11-21 RX ADMIN — SODIUM CHLORIDE 6 UNITS/HR: 9 INJECTION, SOLUTION INTRAVENOUS at 02:11

## 2022-11-21 RX ADMIN — VANCOMYCIN HYDROCHLORIDE 1500 MG: 1.5 INJECTION, POWDER, LYOPHILIZED, FOR SOLUTION INTRAVENOUS at 04:11

## 2022-11-21 RX ADMIN — SODIUM CHLORIDE 7 UNITS/HR: 9 INJECTION, SOLUTION INTRAVENOUS at 03:11

## 2022-11-21 RX ADMIN — VANCOMYCIN HYDROCHLORIDE 1250 MG: 1.25 INJECTION, POWDER, LYOPHILIZED, FOR SOLUTION INTRAVENOUS at 08:11

## 2022-11-21 RX ADMIN — FAMOTIDINE 20 MG: 10 INJECTION, SOLUTION INTRAVENOUS at 08:11

## 2022-11-21 NOTE — PLAN OF CARE
Pt appears to be resting, eyes are closed, breaths are deep and even. VSS, NAD noted at this time. Discussed PoC with pt, stated they understood and would help as able. C/o pain handled w/PRN meds as ordered, continuing to monitor.

## 2022-11-21 NOTE — PROGRESS NOTES
[facility] Medicine  Progress Note    Patient Name: Adore Estes  MRN: [id number]  Patient Class: IP- Inpatient   Admission Date: 11/18/2022  Length of Stay: 2 days  Attending Physician: Ruth Jaimes MD  Primary Care Provider: Pascual Marie MD        Subjective:     Principal Problem:Type 1 diabetes mellitus with ketoacidosis without coma        HPI:  31 year female presented to ED with nausea and face swelling and pain around both eyes. She started with cellulitis in scalp that progressed to forehead. She did not tolerate bactrim and came to ED for an evaluation as the cellulitis progressed with more swelling. She was switched to doxycyline a few days ago and cellulitis still did not improve. With worsening infection, she became nauseated and now in DKA. On workup: WBC 12,000 with a left shift, ESR 43, CRP 85, Beta-hydroxy 5.3, serum CO2 8 Glucose 348, AG 23.  She was admitted on the DKA pathway. Given NS IVF and insulin drip. NPO.    MRSA screen pending. 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 Vancomycin IV started. Blood cultures pending.       Overview/Hospital Course:  No notes on file    Interval History:  Patient is reporting improvement in facial pain redness and swelling.  Currently on intravenous insulin infusion.  Tolerating diet well.  Denies any abdominal pain, nausea and vomiting.  Complaining of hunger.  Currently afebrile.  No chest pain or shortness of breath reported.    Review of Systems   Constitutional:  Positive for activity change and appetite change. Negative for chills, diaphoresis and fever.   HENT:  Positive for facial swelling.    Eyes:  Positive for pain, redness and itching.   Respiratory: Negative.     Cardiovascular: Negative.    Gastrointestinal:  Positive for abdominal pain, nausea and vomiting.   Genitourinary: Negative.    Musculoskeletal: Negative.    Skin:  Positive for rash.   Neurological: Negative.    Psychiatric/Behavioral: Negative.      Objective:     Vital Signs (Most Recent):  Temp: 98.1 °F (36.7 °C) (11/21/22 0738)  Pulse: 85 (11/21/22 0738)  Resp: 16 (11/21/22 0738)  BP: 117/81 (11/21/22 0738)  SpO2: 99 % (11/21/22 0817) Vital Signs (24h Range):  Temp:  [97.8 °F (36.6 °C)-98.4 °F (36.9 °C)] 98.1 °F (36.7 °C)  Pulse:  [74-97] 85  Resp:  [16-20] 16  SpO2:  [98 %-99 %] 99 %  BP: (113-134)/(70-81) 117/81     Weight: 69.4 kg (153 lb) (bed zero'd out)  Body mass index is 21.95 kg/m².    Intake/Output Summary (Last 24 hours) at 11/21/2022 0830  Last data filed at 11/21/2022 0636  Gross per 24 hour   Intake 1612.73 ml   Output 451 ml   Net 1161.73 ml        Physical Exam  Constitutional:       General: She is not in acute distress.     Appearance: Normal appearance.   HENT:      Head: Normocephalic and atraumatic.      Nose: Nose normal.      Mouth/Throat:      Mouth: Mucous membranes are dry.   Eyes:      General:         Right eye: No discharge.         Left eye: No discharge.      Extraocular Movements: Extraocular movements intact.      Pupils: Pupils are equal, round, and reactive to light.   Cardiovascular:      Rate and Rhythm: Normal rate and regular rhythm.      Pulses: Normal pulses.      Heart sounds: No murmur heard.  Pulmonary:      Effort: Pulmonary effort is normal.      Breath sounds: Normal breath sounds. No wheezing.   Abdominal:      General: Abdomen is flat. There is no distension.      Palpations: Abdomen is soft.      Tenderness: There is abdominal tenderness.   Musculoskeletal:         General: Normal range of motion.      Cervical back: Normal range of motion and neck supple.   Lymphadenopathy:      Cervical: No cervical adenopathy.   Skin:     General: Skin is warm and dry.      Findings: Erythema present.      Comments: Erythema and edema around both eyes   Neurological:      General: No focal deficit present.      Mental Status: She is alert and oriented to person, place, and time.      Cranial Nerves: No cranial nerve  deficit.   Psychiatric:         Mood and Affect: Mood normal.         Behavior: Behavior normal.       Significant Labs: All pertinent labs within the past 24 hours have been reviewed.  CBC:   Recent Labs   Lab 11/20/22  0647 11/21/22  0531   WBC 8.33 6.53   HGB 11.9* 11.3*   HCT 34.9* 32.6*    274       CMP:   Recent Labs   Lab 11/20/22  2305 11/21/22  0224 11/21/22  0531   * 135* 136   K 3.5 3.2* 3.4*    105 105   CO2 17* 19* 19*   * 299* 260*   BUN 3* 2* 2*   CREATININE 0.6 0.5 0.5   CALCIUM 8.0* 8.0* 8.2*   ANIONGAP 13 11 12       Lactic Acid:   No results for input(s): LACTATE in the last 48 hours.    POCT Glucose:   Recent Labs   Lab 11/21/22  0559 11/21/22  0701 11/21/22  0806   POCTGLUCOSE 247* 229* 207*       TSH:   Recent Labs   Lab 11/18/22  1528   TSH 0.699       Urine Studies:   No results for input(s): COLORU, APPEARANCEUA, PHUR, SPECGRAV, PROTEINUA, GLUCUA, KETONESU, BILIRUBINUA, OCCULTUA, NITRITE, UROBILINOGEN, LEUKOCYTESUR, RBCUA, WBCUA, BACTERIA, SQUAMEPITHEL, HYALINECASTS in the last 48 hours.    Invalid input(s): [facility]    Microbiology Results (last 7 days)       Procedure Component Value Units Date/Time    Blood culture [[id number]] Collected: 11/18/22 1844    Order Status: Completed Specimen: Blood from Antecubital, Left Updated: 11/21/22 0612     Blood Culture, Routine No Growth to date      No Growth to date      No Growth to date    Blood culture [[id number]] Collected: 11/18/22 1844    Order Status: Completed Specimen: Blood from Antecubital, Right Arm Updated: 11/21/22 0612     Blood Culture, Routine No Growth to date      No Growth to date      No Growth to date    Culture, MRSA [[id number]] Collected: 11/18/22 2134    Order Status: Completed Specimen: MRSA source from Nares, Left Updated: 11/20/22 0711     MRSA Surveillance Screen No MRSA isolated    MRSA Screen by PCR [[id number]] Collected: 11/18/22 1938    Order Status: Sent Specimen: Nasopharyngeal Swab from  Nasal           Significant Imaging: None      Assessment/Plan:      * Type 1 diabetes mellitus with ketoacidosis without coma  Patient's FSGs are uncontrolled due to hyperglycemia on current medication regimen.  Last A1c reviewed-   Lab Results   Component Value Date    HGBA1C 13.3 (H) 11/18/2022     Most recent fingerstick glucose reviewed-   Recent Labs   Lab 11/19/22  0606 11/19/22  0708 11/19/22  0804 11/19/22  0905   POCTGLUCOSE 242* 256* 222* 214*     Current correctional scale  Medium  Increase anti-hyperglycemic dose as follows-   Antihyperglycemics (From admission, onward)    Start     Stop Route Frequency Ordered    11/18/22 1615  insulin regular 1 Units/mL in sodium chloride 0.9% 100 mL infusion        Question Answer Comment   Insulin Rate Adjustment (DO NOT MODIFY ANSWER) \\[email or web address]    Enter initial dose from Infusion Protocol Chart (Units/hr): 2.5        -- IV Continuous 11/18/22 1515        Hold Oral hypoglycemics while patient is in the hospital.    Once AG closes and CO2 increases closer to 18-20, dc insulin infusion; switch IVF and resume diabetic diet.  Starting diabetic clear liquid diet now.  HA1c 13.3  Home regimen is LEvemir + novolog + SSI     Hypophosphatemia  Replete phosphorus.  Follow daily phosphorus level.      Hypomagnesemia  Replete magnesium.  Follow daily magnesium level.      Hypokalemia  Replete potassium chloride.  Follow daily BMP.      Facial cellulitis  Due to her h/o MRSA- will screen on admit  Vancomycin IV - pharmacy to dose   Blood culture       High anion gap metabolic acidosis    Secondary to DKA/water deficit   Aggressive IVF       Patient encouraged to increase activity and ambulation.  VTE Risk Mitigation (From admission, onward)         Ordered     enoxaparin injection 40 mg  Daily         11/18/22 1515     IP VTE HIGH RISK PATIENT  Once         11/18/22 1515     Place KIANA hose  Until discontinued          11/18/22 1515     Place sequential compression device  Until discontinued         11/18/22 1515                Discharge Planning   TRICE:  11/22/22    Code Status: Full Code   Is the patient medically ready for discharge?:     Reason for patient still in hospital (select all that apply): Patient trending condition  Discharge Plan A: Home                  Cyndie Lloyd MD  Department of Hospital Medicine   [facility]

## 2022-11-21 NOTE — PLAN OF CARE
Problem: Diabetic Ketoacidosis  Goal: Fluid and Electrolyte Balance with Absence of Ketosis  Outcome: Ongoing, Progressing     Problem: Adult Inpatient Plan of Care  Goal: Plan of Care Review  Outcome: Ongoing, Progressing  Goal: Patient-Specific Goal (Individualized)  Outcome: Ongoing, Progressing  Goal: Absence of Hospital-Acquired Illness or Injury  Outcome: Ongoing, Progressing  Goal: Optimal Comfort and Wellbeing  Outcome: Ongoing, Progressing  Goal: Readiness for Transition of Care  Outcome: Ongoing, Progressing     Problem: Diabetes Comorbidity  Goal: Blood Glucose Level Within Targeted Range  Outcome: Ongoing, Progressing        POC discussed with the pt. Potassium replaced per standing orders. Insulin Gtt infusing protocols followed. Tele monitor in use. Safety measures maintained. No complaints at this time.

## 2022-11-21 NOTE — PLAN OF CARE
11/20/22 1915   Patient Assessment/Suction   Level of Consciousness (AVPU) alert   Respiratory Effort Normal;Unlabored   Rhythm/Pattern, Respiratory pattern regular   PRE-TX-O2   O2 Device (Oxygen Therapy) room air   SpO2 98 %   Pulse Oximetry Type Intermittent

## 2022-11-21 NOTE — SUBJECTIVE & OBJECTIVE
Interval History:  Patient is reporting improvement in facial pain redness and swelling.  Currently on intravenous insulin infusion.  Tolerating diet well.  Denies any abdominal pain, nausea and vomiting.  Complaining of hunger.  Currently afebrile.  No chest pain or shortness of breath reported.    Review of Systems   Constitutional:  Positive for activity change and appetite change. Negative for chills, diaphoresis and fever.   HENT:  Positive for facial swelling.    Eyes:  Positive for pain, redness and itching.   Respiratory: Negative.     Cardiovascular: Negative.    Gastrointestinal:  Positive for abdominal pain, nausea and vomiting.   Genitourinary: Negative.    Musculoskeletal: Negative.    Skin:  Positive for rash.   Neurological: Negative.    Psychiatric/Behavioral: Negative.     Objective:     Vital Signs (Most Recent):  Temp: 98.1 °F (36.7 °C) (11/21/22 0738)  Pulse: 85 (11/21/22 0738)  Resp: 16 (11/21/22 0738)  BP: 117/81 (11/21/22 0738)  SpO2: 99 % (11/21/22 0817) Vital Signs (24h Range):  Temp:  [97.8 °F (36.6 °C)-98.4 °F (36.9 °C)] 98.1 °F (36.7 °C)  Pulse:  [74-97] 85  Resp:  [16-20] 16  SpO2:  [98 %-99 %] 99 %  BP: (113-134)/(70-81) 117/81     Weight: 69.4 kg (153 lb) (bed zero'd out)  Body mass index is 21.95 kg/m².    Intake/Output Summary (Last 24 hours) at 11/21/2022 0830  Last data filed at 11/21/2022 0636  Gross per 24 hour   Intake 1612.73 ml   Output 451 ml   Net 1161.73 ml        Physical Exam  Constitutional:       General: She is not in acute distress.     Appearance: Normal appearance.   HENT:      Head: Normocephalic and atraumatic.      Nose: Nose normal.      Mouth/Throat:      Mouth: Mucous membranes are dry.   Eyes:      General:         Right eye: No discharge.         Left eye: No discharge.      Extraocular Movements: Extraocular movements intact.      Pupils: Pupils are equal, round, and reactive to light.   Cardiovascular:      Rate and Rhythm: Normal rate and regular rhythm.       Pulses: Normal pulses.      Heart sounds: No murmur heard.  Pulmonary:      Effort: Pulmonary effort is normal.      Breath sounds: Normal breath sounds. No wheezing.   Abdominal:      General: Abdomen is flat. There is no distension.      Palpations: Abdomen is soft.      Tenderness: There is abdominal tenderness.   Musculoskeletal:         General: Normal range of motion.      Cervical back: Normal range of motion and neck supple.   Lymphadenopathy:      Cervical: No cervical adenopathy.   Skin:     General: Skin is warm and dry.      Findings: Erythema present.      Comments: Erythema and edema around both eyes   Neurological:      General: No focal deficit present.      Mental Status: She is alert and oriented to person, place, and time.      Cranial Nerves: No cranial nerve deficit.   Psychiatric:         Mood and Affect: Mood normal.         Behavior: Behavior normal.       Significant Labs: All pertinent labs within the past 24 hours have been reviewed.  CBC:   Recent Labs   Lab 11/20/22  0647 11/21/22  0531   WBC 8.33 6.53   HGB 11.9* 11.3*   HCT 34.9* 32.6*    274       CMP:   Recent Labs   Lab 11/20/22  2305 11/21/22  0224 11/21/22  0531   * 135* 136   K 3.5 3.2* 3.4*    105 105   CO2 17* 19* 19*   * 299* 260*   BUN 3* 2* 2*   CREATININE 0.6 0.5 0.5   CALCIUM 8.0* 8.0* 8.2*   ANIONGAP 13 11 12       Lactic Acid:   No results for input(s): LACTATE in the last 48 hours.    POCT Glucose:   Recent Labs   Lab 11/21/22  0559 11/21/22  0701 11/21/22  0806   POCTGLUCOSE 247* 229* 207*       TSH:   Recent Labs   Lab 11/18/22  1528   TSH 0.699       Urine Studies:   No results for input(s): COLORU, APPEARANCEUA, PHUR, SPECGRAV, PROTEINUA, GLUCUA, KETONESU, BILIRUBINUA, OCCULTUA, NITRITE, UROBILINOGEN, LEUKOCYTESUR, RBCUA, WBCUA, BACTERIA, SQUAMEPITHEL, HYALINECASTS in the last 48 hours.    Invalid input(s): [facility]    Microbiology Results (last 7 days)       Procedure Component Value  Units Date/Time    Blood culture [[id number]] Collected: 11/18/22 1844    Order Status: Completed Specimen: Blood from Antecubital, Left Updated: 11/21/22 0612     Blood Culture, Routine No Growth to date      No Growth to date      No Growth to date    Blood culture [[id number]] Collected: 11/18/22 1844    Order Status: Completed Specimen: Blood from Antecubital, Right Arm Updated: 11/21/22 0612     Blood Culture, Routine No Growth to date      No Growth to date      No Growth to date    Culture, MRSA [[id number]] Collected: 11/18/22 2134    Order Status: Completed Specimen: MRSA source from Nares, Left Updated: 11/20/22 0711     MRSA Surveillance Screen No MRSA isolated    MRSA Screen by PCR [[id number]] Collected: 11/18/22 1938    Order Status: Sent Specimen: Nasopharyngeal Swab from Nasal           Significant Imaging: None

## 2022-11-21 NOTE — PROGRESS NOTES
Pharmacokinetic Assessment Follow Up: IV Vancomycin    Vancomycin serum concentration assessment(s):    The trough level was drawn correctly and can be used to guide therapy at this time. The measurement is below the desired definitive target range of 10 to 15 mcg/mL.    Vancomycin Regimen Plan:    Change regimen to Vancomycin 1500 mg IV every 8 hours with next serum trough concentration measured at 0730 prior to fourth dose on 11/22/22    Drug levels (last 3 results):  Recent Labs   Lab Result Units 11/19/22  1500 11/20/22  1534 11/21/22  0743   Vancomycin-Trough ug/mL 5.1* 3.9* 7.8*       Pharmacy will continue to follow and monitor vancomycin.    Please contact pharmacy at extension [phone] for questions regarding this assessment.    Thank you for the consult,   Hope Fregoso       Patient brief summary:  Adore Estes is a 31 y.o. female initiated on antimicrobial therapy with IV Vancomycin for treatment of skin & soft tissue infection      Drug Allergies:   Review of patient's allergies indicates:   Allergen Reactions    Cerebyx [fosphenytoin] Itching    Amoxil [amoxicillin] Hives       Actual Body Weight:   69.4 kg    Renal Function:   Estimated Creatinine Clearance: 174.7 mL/min (based on SCr of 0.5 mg/dL).,     Dialysis Method (if applicable):  N/A    CBC (last 72 hours):  Recent Labs   Lab Result Units 11/18/22  1340 11/18/22  1528 11/19/22  0434 11/20/22  0647 11/21/22  0531   WBC K/uL 13.82*  --  9.17 8.33 6.53   Hemoglobin g/dL 15.7  --  12.6 11.9* 11.3*   Hemoglobin A1C %  --  13.3*  --   --   --    Hematocrit % 48.3  --  37.4 34.9* 32.6*   Platelets K/uL 377  --  297 276 274   Gran % % 83.1*  --  74.8* 71.3 64.5   Lymph % % 10.6*  --  15.4* 16.2* 23.6   Mono % % 4.8  --  7.6 10.2 9.3   Eosinophil % % 0.2  --  1.1 1.3 1.5   Basophil % % 0.4  --  0.4 0.4 0.3   Differential Method  Automated  --  Automated Automated Automated       Metabolic Panel (last 72 hours):  Recent Labs   Lab Result Units  11/18/22  1340 11/18/22  1404 11/18/22  1528 11/18/22  1844 11/18/22  2246 11/19/22  0434 11/19/22  0821 11/19/22  1047 11/19/22  1500 11/19/22  1837 11/19/22  2241 11/20/22  0442 11/20/22  0647 11/20/22  1035 11/20/22  1534 11/20/22  1845 11/20/22  2305 11/21/22  0224 11/21/22  0531   Sodium mmol/L 134*  --  133* 132* 133* 134* 134* 134* 135* 134* 133*  --  136 135* 134* 135* 134* 135* 136   Potassium mmol/L 3.6  --  3.6 3.7 3.6 3.2* 3.3* 3.5 3.6 3.4* 3.1*  --  3.6 3.3* 3.5 3.5 3.5 3.2* 3.4*   Chloride mmol/L 103  --  109 106 109 108 108 108 106 105 103  --  105 102 102 103 104 105 105   CO2 mmol/L 8*  --  10* 12* 12* 13* 15* 15* 15* 16* 17*  --  21* 20* 16* 20* 17* 19* 19*   Glucose mg/dL 348*  --  253* 198* 214* 248* 260* 262* 265* 269* 364*  --  227* 273* 286* 352* 288* 299* 260*   Glucose, UA   --  3+*  --   --   --   --   --   --   --   --   --   --   --   --   --   --   --   --   --    BUN mg/dL 7  --  7 6 5* 4* 3* 3* 2* 2* <2*  --  <2* <2* <2* 2* 3* 2* 2*   Creatinine mg/dL 1.1  --  0.8 0.9 0.7 0.7 0.7 0.7 0.7 0.6 0.6  --  0.5 0.5 0.6 0.8 0.6 0.5 0.5   Albumin g/dL 3.8  --   --   --   --   --   --   --   --   --   --   --   --   --   --   --   --   --   --    Total Bilirubin mg/dL 0.3  --   --   --   --   --   --   --   --   --   --   --   --   --   --   --   --   --   --    Alkaline Phosphatase U/L 151*  --   --   --   --   --   --   --   --   --   --   --   --   --   --   --   --   --   --    AST U/L 8*  --   --   --   --   --   --   --   --   --   --   --   --   --   --   --   --   --   --    ALT U/L 9*  --   --   --   --   --   --   --   --   --   --   --   --   --   --   --   --   --   --    Magnesium mg/dL  --   --   --   --   --  1.5*  --   --   --   --   --  1.2*  --   --   --   --   --   --  1.7   Phosphorus mg/dL  --   --   --   --   --  1.8*  --   --   --   --   --  2.1*  --   --   --   --   --   --  2.7       Vancomycin Administrations:  vancomycin given in the last 96 hours                      vancomycin 1.25 g in dextrose 5% 250 mL IVPB (ready to mix) (mg) 1,250 mg New Bag 11/21/22 0824     1,250 mg New Bag  0059     1,250 mg New Bag 11/20/22 1641    vancomycin 1.5 g in dextrose 5 % 250 mL IVPB (ready to mix) (mg) 1,500 mg New Bag 11/20/22 0413     1,500 mg New Bag 11/19/22 1705    vancomycin 1.25 g in dextrose 5% 250 mL IVPB (ready to mix) (mg) 1,250 mg New Bag 11/19/22 0503     1,250 mg New Bag 11/18/22 1702                    Microbiologic Results:  Microbiology Results (last 7 days)       Procedure Component Value Units Date/Time    Blood culture [[id number]] Collected: 11/18/22 1844    Order Status: Completed Specimen: Blood from Antecubital, Left Updated: 11/21/22 0612     Blood Culture, Routine No Growth to date      No Growth to date      No Growth to date    Blood culture [[id number]] Collected: 11/18/22 1844    Order Status: Completed Specimen: Blood from Antecubital, Right Arm Updated: 11/21/22 0612     Blood Culture, Routine No Growth to date      No Growth to date      No Growth to date    Culture, MRSA [[id number]] Collected: 11/18/22 2134    Order Status: Completed Specimen: MRSA source from Nares, Left Updated: 11/20/22 0711     MRSA Surveillance Screen No MRSA isolated    MRSA Screen by PCR [[id number]] Collected: 11/18/22 1938    Order Status: Sent Specimen: Nasopharyngeal Swab from Nasal

## 2022-11-21 NOTE — CARE UPDATE
11/21/22 0817   PRE-TX-O2   O2 Device (Oxygen Therapy) room air   SpO2 99 %   Pulse Oximetry Type Intermittent   $ Pulse Oximetry - Multiple Charge Pulse Oximetry - Multiple

## 2022-11-21 NOTE — CONSULTS
"                                                    Food & Nutrition                                                           Education     Diet Education: Diabetic Diet and diabetes management  Time Spent: 15 minutes  Learners: Patient        Nutrition Education provided with handouts: no, pt declined        Comments: Pt admitted with DKA/hyperglycemia r/t facial cellulitis. PMH Dm 1 on insulin pump and seizures. Pt tells me that she uses her insulin pump correctly and carb counts when she eats and takes the right amount of insulin, however her A1C is 13.3. I quizzed pt on carb amounts in certain foods such as pasta and milk. She answered my questions correctly and denied the need for further education concerning carb counts as she has been, "doing it for 26 years." She tells me she has all of her insulin supplies at home, no insurance issues, no barriers to compliance. Will attempt further discussion at follow up, but pt was not open to discussion at this time.      Assessment: NFPE 11/21/22 WDL. Reports good appetite PTA, however Phos/Mg/K depleted, needs electrolyte replacement.  All questions and concerns answered. Dietitian's contact information provided.         Follow-Up: yes     Please Re-consult as needed           Thanks!          "

## 2022-11-22 VITALS
SYSTOLIC BLOOD PRESSURE: 112 MMHG | BODY MASS INDEX: 21.9 KG/M2 | RESPIRATION RATE: 18 BRPM | HEIGHT: 70 IN | DIASTOLIC BLOOD PRESSURE: 78 MMHG | TEMPERATURE: 98 F | OXYGEN SATURATION: 99 % | WEIGHT: 153 LBS | HEART RATE: 92 BPM

## 2022-11-22 LAB
ANION GAP SERPL CALC-SCNC: 10 MMOL/L (ref 8–16)
ANION GAP SERPL CALC-SCNC: 8 MMOL/L (ref 8–16)
B-OH-BUTYR BLD STRIP-SCNC: 0.3 MMOL/L (ref 0–0.5)
BASOPHILS # BLD AUTO: 0.02 K/UL (ref 0–0.2)
BASOPHILS NFR BLD: 0.3 % (ref 0–1.9)
BUN SERPL-MCNC: 2 MG/DL (ref 6–20)
BUN SERPL-MCNC: 3 MG/DL (ref 6–20)
CALCIUM SERPL-MCNC: 8.7 MG/DL (ref 8.7–10.5)
CALCIUM SERPL-MCNC: 8.8 MG/DL (ref 8.7–10.5)
CHLORIDE SERPL-SCNC: 100 MMOL/L (ref 95–110)
CHLORIDE SERPL-SCNC: 102 MMOL/L (ref 95–110)
CO2 SERPL-SCNC: 26 MMOL/L (ref 23–29)
CO2 SERPL-SCNC: 28 MMOL/L (ref 23–29)
CREAT SERPL-MCNC: 0.5 MG/DL (ref 0.5–1.4)
CREAT SERPL-MCNC: 0.6 MG/DL (ref 0.5–1.4)
DIFFERENTIAL METHOD: ABNORMAL
EOSINOPHIL # BLD AUTO: 0.1 K/UL (ref 0–0.5)
EOSINOPHIL NFR BLD: 1.4 % (ref 0–8)
ERYTHROCYTE [DISTWIDTH] IN BLOOD BY AUTOMATED COUNT: 11.6 % (ref 11.5–14.5)
EST. GFR  (NO RACE VARIABLE): >60 ML/MIN/1.73 M^2
EST. GFR  (NO RACE VARIABLE): >60 ML/MIN/1.73 M^2
GLUCOSE SERPL-MCNC: 273 MG/DL (ref 70–110)
GLUCOSE SERPL-MCNC: 278 MG/DL (ref 70–110)
HCT VFR BLD AUTO: 33.7 % (ref 37–48.5)
HGB BLD-MCNC: 11.3 G/DL (ref 12–16)
IMM GRANULOCYTES # BLD AUTO: 0.04 K/UL (ref 0–0.04)
IMM GRANULOCYTES NFR BLD AUTO: 0.6 % (ref 0–0.5)
LYMPHOCYTES # BLD AUTO: 1.4 K/UL (ref 1–4.8)
LYMPHOCYTES NFR BLD: 21.8 % (ref 18–48)
MAGNESIUM SERPL-MCNC: 1.6 MG/DL (ref 1.6–2.6)
MCH RBC QN AUTO: 28.7 PG (ref 27–31)
MCHC RBC AUTO-ENTMCNC: 33.5 G/DL (ref 32–36)
MCV RBC AUTO: 86 FL (ref 82–98)
MONOCYTES # BLD AUTO: 0.6 K/UL (ref 0.3–1)
MONOCYTES NFR BLD: 8.4 % (ref 4–15)
NEUTROPHILS # BLD AUTO: 4.4 K/UL (ref 1.8–7.7)
NEUTROPHILS NFR BLD: 67.5 % (ref 38–73)
NRBC BLD-RTO: 0 /100 WBC
PHOSPHATE SERPL-MCNC: 3.5 MG/DL (ref 2.7–4.5)
PLATELET # BLD AUTO: 282 K/UL (ref 150–450)
PMV BLD AUTO: 9 FL (ref 9.2–12.9)
POCT GLUCOSE: 220 MG/DL (ref 70–110)
POCT GLUCOSE: 239 MG/DL (ref 70–110)
POCT GLUCOSE: 239 MG/DL (ref 70–110)
POCT GLUCOSE: 245 MG/DL (ref 70–110)
POCT GLUCOSE: 247 MG/DL (ref 70–110)
POCT GLUCOSE: 253 MG/DL (ref 70–110)
POCT GLUCOSE: 260 MG/DL (ref 70–110)
POCT GLUCOSE: 263 MG/DL (ref 70–110)
POCT GLUCOSE: 271 MG/DL (ref 70–110)
POCT GLUCOSE: 273 MG/DL (ref 70–110)
POCT GLUCOSE: 296 MG/DL (ref 70–110)
POTASSIUM SERPL-SCNC: 3.5 MMOL/L (ref 3.5–5.1)
POTASSIUM SERPL-SCNC: 3.9 MMOL/L (ref 3.5–5.1)
RBC # BLD AUTO: 3.94 M/UL (ref 4–5.4)
SODIUM SERPL-SCNC: 136 MMOL/L (ref 136–145)
SODIUM SERPL-SCNC: 138 MMOL/L (ref 136–145)
VANCOMYCIN TROUGH SERPL-MCNC: 8.8 UG/ML (ref 10–22)
WBC # BLD AUTO: 6.57 K/UL (ref 3.9–12.7)

## 2022-11-22 PROCEDURE — 25000003 PHARM REV CODE 250: Performed by: INTERNAL MEDICINE

## 2022-11-22 PROCEDURE — S5010 5% DEXTROSE AND 0.45% SALINE: HCPCS | Performed by: NURSE PRACTITIONER

## 2022-11-22 PROCEDURE — 83735 ASSAY OF MAGNESIUM: CPT | Performed by: HOSPITALIST

## 2022-11-22 PROCEDURE — 85025 COMPLETE CBC W/AUTO DIFF WBC: CPT | Performed by: HOSPITALIST

## 2022-11-22 PROCEDURE — 84100 ASSAY OF PHOSPHORUS: CPT | Performed by: HOSPITALIST

## 2022-11-22 PROCEDURE — 94761 N-INVAS EAR/PLS OXIMETRY MLT: CPT

## 2022-11-22 PROCEDURE — 80202 ASSAY OF VANCOMYCIN: CPT | Performed by: HOSPITALIST

## 2022-11-22 PROCEDURE — 80048 BASIC METABOLIC PNL TOTAL CA: CPT | Mod: 91 | Performed by: HOSPITALIST

## 2022-11-22 PROCEDURE — 25000003 PHARM REV CODE 250: Performed by: NURSE PRACTITIONER

## 2022-11-22 PROCEDURE — 82010 KETONE BODYS QUAN: CPT | Performed by: INTERNAL MEDICINE

## 2022-11-22 PROCEDURE — 36415 COLL VENOUS BLD VENIPUNCTURE: CPT | Performed by: HOSPITALIST

## 2022-11-22 PROCEDURE — 63600175 PHARM REV CODE 636 W HCPCS: Performed by: HOSPITALIST

## 2022-11-22 PROCEDURE — 25000003 PHARM REV CODE 250: Performed by: HOSPITALIST

## 2022-11-22 PROCEDURE — 94760 N-INVAS EAR/PLS OXIMETRY 1: CPT

## 2022-11-22 RX ORDER — CLINDAMYCIN HYDROCHLORIDE 300 MG/1
300 CAPSULE ORAL EVERY 8 HOURS
Qty: 12 CAPSULE | Refills: 0 | Status: SHIPPED | OUTPATIENT
Start: 2022-11-22 | End: 2022-11-26

## 2022-11-22 RX ORDER — GLUCAGON 1 MG
1 KIT INJECTION
Status: DISCONTINUED | OUTPATIENT
Start: 2022-11-23 | End: 2022-11-22 | Stop reason: HOSPADM

## 2022-11-22 RX ORDER — IBUPROFEN 200 MG
24 TABLET ORAL
Status: DISCONTINUED | OUTPATIENT
Start: 2022-11-23 | End: 2022-11-22 | Stop reason: HOSPADM

## 2022-11-22 RX ORDER — IBUPROFEN 200 MG
16 TABLET ORAL
Status: DISCONTINUED | OUTPATIENT
Start: 2022-11-23 | End: 2022-11-22 | Stop reason: HOSPADM

## 2022-11-22 RX ADMIN — FAMOTIDINE 20 MG: 10 INJECTION, SOLUTION INTRAVENOUS at 08:11

## 2022-11-22 RX ADMIN — INSULIN DETEMIR 15 UNITS: 100 INJECTION, SOLUTION SUBCUTANEOUS at 09:11

## 2022-11-22 RX ADMIN — POTASSIUM BICARBONATE 35 MEQ: 391 TABLET, EFFERVESCENT ORAL at 02:11

## 2022-11-22 RX ADMIN — VANCOMYCIN HYDROCHLORIDE 1500 MG: 1.5 INJECTION, POWDER, LYOPHILIZED, FOR SOLUTION INTRAVENOUS at 08:11

## 2022-11-22 RX ADMIN — DEXTROSE AND SODIUM CHLORIDE: 5; .45 INJECTION, SOLUTION INTRAVENOUS at 03:11

## 2022-11-22 RX ADMIN — POTASSIUM BICARBONATE 35 MEQ: 391 TABLET, EFFERVESCENT ORAL at 12:11

## 2022-11-22 NOTE — DISCHARGE SUMMARY
[facility] Medicine  Discharge Summary      Patient Name: Adore Estes  MRN: [id number]  EVERARDO: [id number]  Patient Class: IP- Inpatient  Admission Date: 11/18/2022  Hospital Length of Stay: 3 days  Discharge Date and Time:  11/22/2022 11:27 AM  Attending Physician: Cyndie Lloyd MD   Discharging Provider: Cyndie Lloyd MD  Primary Care Provider: Pascual Marie MD    Primary Care Team: Networked reference to record PCT     HPI:   31 year female presented to ED with nausea and face swelling and pain around both eyes. She started with cellulitis in scalp that progressed to forehead. She did not tolerate bactrim and came to ED for an evaluation as the cellulitis progressed with more swelling. She was switched to doxycyline a few days ago and cellulitis still did not improve. With worsening infection, she became nauseated and now in DKA. On workup: WBC 12,000 with a left shift, ESR 43, CRP 85, Beta-hydroxy 5.3, serum CO2 8 Glucose 348, AG 23.  She was admitted on the DKA pathway. Given NS IVF and insulin drip. NPO.    MRSA screen pending. 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 Vancomycin IV started. Blood cultures pending.       * No surgery found *      Hospital Course:   Patient was admitted to medicine telemetry service and was initiated on intravenous antibiotic therapy.  Patient was placed on diabetic ketoacidosis pathway during which patient received IV fluid hydration and intravenous insulin infusion.  Serum electrolytes were closely followed.  With antibiotic therapy use facial cellulitis significantly improved.  Diabetic ketoacidosis resolved.  Patient's home insulin regimen initiated.  Patient is extremely anxious to be discharged as early as possible so that she can spend time with family at [facility].  Patient was counseled regarding compliance with insulin and keeping blood glucose log.  Patient to follow-up with primary care physician next week.  Patient to complete  antibiotic therapy course as per directions.  In case of any worsening facial swelling, headache, high-grade fever or mental status changes or headache or neck pain patient to go to the nearest emergency room as soon as possible.  Patient voiced understanding.    Goals of Care Treatment Preferences:  Code Status: Full Code      Consults:   Consults (From admission, onward)          Status Ordering Provider     Pharmacy to dose Vancomycin consult  Once        Provider:  (Not yet assigned)   See [facility] for full Linked Orders Report.    Acknowledged AMY ZAMORANO     Inpatient consult to Registered Dietitian/Nutritionist  Once        Provider:  (Not yet assigned)    Completed AMY ZAMORANO     Inpatient consult to Diabetes educator  Once        Provider:  (Not yet assigned)    Completed AMY ZAMORANO          Microbiology Results (last 7 days)       Procedure Component Value Units Date/Time    Blood culture [[id number]] Collected: 11/18/22 1844    Order Status: Completed Specimen: Blood from Antecubital, Left Updated: 11/22/22 0612     Blood Culture, Routine No Growth to date      No Growth to date      No Growth to date      No Growth to date    Blood culture [[id number]] Collected: 11/18/22 1844    Order Status: Completed Specimen: Blood from Antecubital, Right Arm Updated: 11/22/22 0612     Blood Culture, Routine No Growth to date      No Growth to date      No Growth to date      No Growth to date    Culture, MRSA [[id number]] Collected: 11/18/22 2134    Order Status: Completed Specimen: MRSA source from Nares, Left Updated: 11/20/22 0711     MRSA Surveillance Screen No MRSA isolated    MRSA Screen by PCR [[id number]] Collected: 11/18/22 1938    Order Status: Sent Specimen: Nasopharyngeal Swab from Nasal           Final Active Diagnoses:    Diagnosis Date Noted POA    PRINCIPAL PROBLEM:  Type 1 diabetes mellitus with ketoacidosis without coma [E10.10] 05/05/2016 Yes    Hypokalemia [E87.6] 11/19/2022 No     Hypomagnesemia [E83.42] 11/19/2022 No    Hypophosphatemia [E83.39] 11/19/2022 No    High anion gap metabolic acidosis [E87.29] 11/18/2022 Yes    Facial cellulitis [L03.211] 11/18/2022 Yes      Problems Resolved During this Admission:       Discharged Condition: good    Disposition: Home or Self Care    Follow Up:   Follow-up Information       Pascual Marie MD Follow up in 1 week(s).    Specialties: Internal Medicine, Physical Medicine and Rehabilitation  Contact information:  [place] [address]  [phone]                           Patient Instructions:      Ambulatory referral/consult to Diabetes Education   Standing Status: Future   Referral Priority: Routine Referral Type: Consultation   Referral Reason: Specialty Services Required   Requested Specialty: Diabetes   Number of Visits Requested: 1 Expiration Date: 11/22/23     Diet diabetic     Notify your health care provider if you experience any of the following:  temperature >100.4     Notify your health care provider if you experience any of the following:  redness, tenderness, or signs of infection (pain, swelling, redness, odor or green/yellow discharge around incision site)     Notify your health care provider if you experience any of the following:  persistent dizziness, light-headedness, or visual disturbances     Notify your health care provider if you experience any of the following:  increased confusion or weakness     Activity as tolerated   Order Comments: Fall precautions       Significant Diagnostic Studies: Labs:   CMP   Recent Labs   Lab 11/21/22  2244 11/22/22  0243 11/22/22  0657    136 138   K 3.1* 3.9 3.5    100 102   CO2 27 26 28   * 273* 278*   BUN 3* 3* 2*   CREATININE 0.5 0.6 0.5   CALCIUM 8.7 8.7 8.8   ANIONGAP 8 10 8   , CBC   Recent Labs   Lab 11/21/22  0531 11/22/22  0657   WBC 6.53 6.57   HGB 11.3* 11.3*   HCT 32.6* 33.7*    282    and INR No results found for: INR,  PROTIME    Pending Diagnostic Studies:       None           Medications:  Reconciled Home Medications:      Medication List        START taking these medications      clindamycin 300 MG capsule  Commonly known as: CLEOCIN  Take 1 capsule (300 mg total) by mouth every 8 (eight) hours. for 4 days            CONTINUE taking these medications      blood sugar diagnostic Strp  Check 5x daily for hypoglycemia.  True Metrix     insulin aspart U-100 100 unit/mL (3 mL) Inpn pen  Commonly known as: NovoLOG  To use with carb ratio 1:4, ISF: 25 MDD: 80 units     insulin detemir U-100 100 unit/mL (3 mL) Inpn pen  Commonly known as: Levemir FLEXTOUCH  Inject 15 Units into the skin 2 (two) times daily. MDD: 30 units            STOP taking these medications      doxycycline 100 MG Cap  Commonly known as: VIBRAMYCIN              Indwelling Lines/Drains at time of discharge:   Lines/Drains/Airways       None                   Time spent on the discharge of patient: 32 minutes         Cyndie Lloyd MD  Department of Hospital Medicine  [facility]

## 2022-11-22 NOTE — NURSING
Discharge instructions given, patient verbalized understanding. PIV removed with catheter intact. Telemetry removed and returned to monitor room. Awaiting ride.

## 2022-11-22 NOTE — CARE UPDATE
11/22/22 1038   PRE-TX-O2   O2 Device (Oxygen Therapy) room air   SpO2 98 %   Pulse Oximetry Type Intermittent   $ Pulse Oximetry - Multiple Charge Pulse Oximetry - Multiple

## 2022-11-22 NOTE — PLAN OF CARE
Problem: Adult Inpatient Plan of Care  Goal: Optimal Comfort and Wellbeing  Outcome: Ongoing, Progressing  Goal: Readiness for Transition of Care  Outcome: Ongoing, Progressing     Problem: Diabetes Comorbidity  Goal: Blood Glucose Level Within Targeted Range  Outcome: Ongoing, Progressing   Pt is Awake, alert, and oriented. Plan of care discussed with pt., pt verbalized understanding. Insulin infusing per algorithm, Accu q1 hr. VSS. K+ replaced. No signs of distress noted. Bed alarm set, nonskid socks, call light in reach, instructed to call to get up.

## 2022-11-22 NOTE — PLAN OF CARE
11/21/22 1946   Patient Assessment/Suction   Level of Consciousness (AVPU) alert   Respiratory Effort Normal;Unlabored   Rhythm/Pattern, Respiratory pattern regular   PRE-TX-O2   O2 Device (Oxygen Therapy) room air   SpO2 97 %   Pulse Oximetry Type Intermittent

## 2022-11-22 NOTE — PLAN OF CARE
Problem: Adult Inpatient Plan of Care  Goal: Plan of Care Review  Outcome: Ongoing, Progressing  Goal: Patient-Specific Goal (Individualized)  Outcome: Ongoing, Progressing  Goal: Absence of Hospital-Acquired Illness or Injury  Outcome: Ongoing, Progressing  Goal: Optimal Comfort and Wellbeing  Outcome: Ongoing, Progressing  Goal: Readiness for Transition of Care  Outcome: Ongoing, Progressing     Problem: Diabetic Ketoacidosis  Goal: Fluid and Electrolyte Balance with Absence of Ketosis  Outcome: Ongoing, Progressing     Problem: Diabetes Comorbidity  Goal: Blood Glucose Level Within Targeted Range  Outcome: Ongoing, Progressing

## 2022-11-22 NOTE — PLAN OF CARE
Pt cleared for discharge from case management    SW called Dr. Reese [phone] office to schedule hospital dc f/u appt, left message asking someone to call pt to schedule appt in 1 week w PCP or KAREL.     11/22/22 3762   Final Note   Assessment Type Final Discharge Note   Anticipated Discharge Disposition Home   What phone number can be called within the next 1-3 days to see how you are doing after discharge?   ([phone])

## 2022-11-22 NOTE — PROGRESS NOTES
Pharmacokinetic Assessment Follow Up: IV Vancomycin    Vancomycin serum concentration assessment(s):    The trough level was drawn correctly and can be used to guide therapy at this time. The measurement is below the desired definitive target range of 10 to 15 mcg/mL.    Vancomycin Regimen Plan:    Change regimen to Vancomycin 1750 mg IV every 8 hours with next serum trough concentration measured at 0700 prior to fourth dose on 11/23/22    Drug levels (last 3 results):  Recent Labs   Lab Result Units 11/20/22  1534 11/21/22  0743 11/22/22  0659   Vancomycin-Trough ug/mL 3.9* 7.8* 8.8*       Pharmacy will continue to follow and monitor vancomycin.    Please contact pharmacy at extension [phone] for questions regarding this assessment.    Thank you for the consult,   Hope Fregoso       Patient brief summary:  Adore Estes is a 31 y.o. female initiated on antimicrobial therapy with IV Vancomycin for treatment of skin & soft tissue infection    Drug Allergies:   Review of patient's allergies indicates:   Allergen Reactions    Cerebyx [fosphenytoin] Itching    Amoxil [amoxicillin] Hives       Actual Body Weight:   69.4 kg    Renal Function:   Estimated Creatinine Clearance: 174.7 mL/min (based on SCr of 0.5 mg/dL).,     Dialysis Method (if applicable):  N/A    CBC (last 72 hours):  Recent Labs   Lab Result Units 11/20/22  0647 11/21/22  0531 11/22/22  0657   WBC K/uL 8.33 6.53 6.57   Hemoglobin g/dL 11.9* 11.3* 11.3*   Hematocrit % 34.9* 32.6* 33.7*   Platelets K/uL 276 274 282   Gran % % 71.3 64.5 67.5   Lymph % % 16.2* 23.6 21.8   Mono % % 10.2 9.3 8.4   Eosinophil % % 1.3 1.5 1.4   Basophil % % 0.4 0.3 0.3   Differential Method  Automated Automated Automated       Metabolic Panel (last 72 hours):  Recent Labs   Lab Result Units 11/19/22  1047 11/19/22  1500 11/19/22  1837 11/19/22  2241 11/20/22  0442 11/20/22  0647 11/20/22  1035 11/20/22  1534 11/20/22  1845 11/20/22  2305 11/21/22  0224 11/21/22  0531  11/21/22  1057 11/21/22  1434 11/21/22  1830 11/21/22  2244 11/22/22  0243 11/22/22  0657   Sodium mmol/L 134* 135* 134* 133*  --  136 135* 134* 135* 134* 135* 136 137 136 139 138 136 138   Potassium mmol/L 3.5 3.6 3.4* 3.1*  --  3.6 3.3* 3.5 3.5 3.5 3.2* 3.4* 3.6 4.0 3.6 3.1* 3.9 3.5   Chloride mmol/L 108 106 105 103  --  105 102 102 103 104 105 105 103 103 103 103 100 102   CO2 mmol/L 15* 15* 16* 17*  --  21* 20* 16* 20* 17* 19* 19* 24 23 27 27 26 28   Glucose mg/dL 262* 265* 269* 364*  --  227* 273* 286* 352* 288* 299* 260* 245* 293* 256* 216* 273* 278*   BUN mg/dL 3* 2* 2* <2*  --  <2* <2* <2* 2* 3* 2* 2* <2* <2* 2* 3* 3* 2*   Creatinine mg/dL 0.7 0.7 0.6 0.6  --  0.5 0.5 0.6 0.8 0.6 0.5 0.5 0.5 0.6 0.6 0.5 0.6 0.5   Magnesium mg/dL  --   --   --   --  1.2*  --   --   --   --   --   --  1.7  --   --   --   --   --  1.6   Phosphorus mg/dL  --   --   --   --  2.1*  --   --   --   --   --   --  2.7  --   --   --   --   --  3.5       Vancomycin Administrations:  vancomycin given in the last 96 hours                     vancomycin 1.5 g in dextrose 5 % 250 mL IVPB (ready to mix) (mg) 1,500 mg New Bag 11/22/22 0804     1,500 mg New Bag 11/21/22 2358     1,500 mg New Bag  1650    vancomycin 1.25 g in dextrose 5% 250 mL IVPB (ready to mix) (mg) 1,250 mg New Bag 11/21/22 0824     1,250 mg New Bag  0059     1,250 mg New Bag 11/20/22 1641    vancomycin 1.5 g in dextrose 5 % 250 mL IVPB (ready to mix) (mg) 1,500 mg New Bag 11/20/22 0413     1,500 mg New Bag 11/19/22 1705    vancomycin 1.25 g in dextrose 5% 250 mL IVPB (ready to mix) (mg) 1,250 mg New Bag 11/19/22 0503     1,250 mg New Bag 11/18/22 1702                    Microbiologic Results:  Microbiology Results (last 7 days)       Procedure Component Value Units Date/Time    Blood culture [[id number]] Collected: 11/18/22 1844    Order Status: Completed Specimen: Blood from Antecubital, Left Updated: 11/22/22 0612     Blood Culture, Routine No Growth to date      No  Growth to date      No Growth to date      No Growth to date    Blood culture [[id number]] Collected: 11/18/22 1844    Order Status: Completed Specimen: Blood from Antecubital, Right Arm Updated: 11/22/22 0612     Blood Culture, Routine No Growth to date      No Growth to date      No Growth to date      No Growth to date    Culture, MRSA [[id number]] Collected: 11/18/22 2134    Order Status: Completed Specimen: MRSA source from Nares, Left Updated: 11/20/22 0711     MRSA Surveillance Screen No MRSA isolated    MRSA Screen by PCR [[id number]] Collected: 11/18/22 1938    Order Status: Sent Specimen: Nasopharyngeal Swab from Nasal

## 2022-11-22 NOTE — SUBJECTIVE & OBJECTIVE
Interval History:  Patient is reporting improvement in facial pain redness and swelling.  Currently on intravenous insulin infusion.  Tolerating diet well.  Denies any abdominal pain, nausea and vomiting.  Complaining of hunger.  Currently afebrile.  No chest pain or shortness of breath reported.    Review of Systems   Constitutional:  Positive for activity change and appetite change. Negative for chills, diaphoresis and fever.   HENT:  Positive for facial swelling.    Eyes:  Positive for pain, redness and itching.   Respiratory: Negative.     Cardiovascular: Negative.    Gastrointestinal:  Positive for abdominal pain, nausea and vomiting.   Genitourinary: Negative.    Musculoskeletal: Negative.    Skin:  Positive for rash.   Neurological: Negative.    Psychiatric/Behavioral: Negative.     Objective:     Vital Signs (Most Recent):  Temp: 98 °F (36.7 °C) (11/22/22 0747)  Pulse: 87 (11/22/22 0747)  Resp: 16 (11/22/22 0747)  BP: 110/68 (11/22/22 0747)  SpO2: 97 % (11/22/22 0747) Vital Signs (24h Range):  Temp:  [97.1 °F (36.2 °C)-98.8 °F (37.1 °C)] 98 °F (36.7 °C)  Pulse:  [75-87] 87  Resp:  [16-18] 16  SpO2:  [97 %-98 %] 97 %  BP: (110-124)/(64-79) 110/68     Weight: 69.4 kg (153 lb) (bed zero'd out)  Body mass index is 21.95 kg/m².    Intake/Output Summary (Last 24 hours) at 11/22/2022 0919  Last data filed at 11/22/2022 0214  Gross per 24 hour   Intake 120 ml   Output 700 ml   Net -580 ml        Physical Exam  Constitutional:       General: She is not in acute distress.     Appearance: Normal appearance.   HENT:      Head: Normocephalic and atraumatic.      Nose: Nose normal.      Mouth/Throat:      Mouth: Mucous membranes are dry.   Eyes:      General:         Right eye: No discharge.         Left eye: No discharge.      Extraocular Movements: Extraocular movements intact.      Pupils: Pupils are equal, round, and reactive to light.   Cardiovascular:      Rate and Rhythm: Normal rate and regular rhythm.       Pulses: Normal pulses.      Heart sounds: No murmur heard.  Pulmonary:      Effort: Pulmonary effort is normal.      Breath sounds: Normal breath sounds. No wheezing.   Abdominal:      General: Abdomen is flat. There is no distension.      Palpations: Abdomen is soft.      Tenderness: There is abdominal tenderness.   Musculoskeletal:         General: Normal range of motion.      Cervical back: Normal range of motion and neck supple.   Lymphadenopathy:      Cervical: No cervical adenopathy.   Skin:     General: Skin is warm and dry.      Findings: Erythema present.      Comments: Erythema and edema around both eyes   Neurological:      General: No focal deficit present.      Mental Status: She is alert and oriented to person, place, and time.      Cranial Nerves: No cranial nerve deficit.   Psychiatric:         Mood and Affect: Mood normal.         Behavior: Behavior normal.       Significant Labs: All pertinent labs within the past 24 hours have been reviewed.  CBC:   Recent Labs   Lab 11/21/22  0531 11/22/22  0657   WBC 6.53 6.57   HGB 11.3* 11.3*   HCT 32.6* 33.7*    282       CMP:   Recent Labs   Lab 11/21/22  2244 11/22/22  0243 11/22/22  0657    136 138   K 3.1* 3.9 3.5    100 102   CO2 27 26 28   * 273* 278*   BUN 3* 3* 2*   CREATININE 0.5 0.6 0.5   CALCIUM 8.7 8.7 8.8   ANIONGAP 8 10 8       Lactic Acid:   No results for input(s): LACTATE in the last 48 hours.    POCT Glucose:   Recent Labs   Lab 11/22/22  0700 11/22/22  0801 11/22/22  0851   POCTGLUCOSE 271* 220* 273*       TSH:   Recent Labs   Lab 11/18/22  1528   TSH 0.699       Urine Studies:   No results for input(s): COLORU, APPEARANCEUA, PHUR, SPECGRAV, PROTEINUA, GLUCUA, KETONESU, BILIRUBINUA, OCCULTUA, NITRITE, UROBILINOGEN, LEUKOCYTESUR, RBCUA, WBCUA, BACTERIA, SQUAMEPITHEL, HYALINECASTS in the last 48 hours.    Invalid input(s): [facility]    Microbiology Results (last 7 days)       Procedure Component Value Units Date/Time     Blood culture [[id number]] Collected: 11/18/22 1844    Order Status: Completed Specimen: Blood from Antecubital, Left Updated: 11/22/22 0612     Blood Culture, Routine No Growth to date      No Growth to date      No Growth to date      No Growth to date    Blood culture [[id number]] Collected: 11/18/22 1844    Order Status: Completed Specimen: Blood from Antecubital, Right Arm Updated: 11/22/22 0612     Blood Culture, Routine No Growth to date      No Growth to date      No Growth to date      No Growth to date    Culture, MRSA [[id number]] Collected: 11/18/22 2134    Order Status: Completed Specimen: MRSA source from Nares, Left Updated: 11/20/22 0711     MRSA Surveillance Screen No MRSA isolated    MRSA Screen by PCR [[id number]] Collected: 11/18/22 1938    Order Status: Sent Specimen: Nasopharyngeal Swab from Nasal           Significant Imaging: None

## 2022-11-23 ENCOUNTER — TELEPHONE (OUTPATIENT)
Dept: MEDSURG UNIT | Facility: HOSPITAL | Age: 32
End: 2022-11-23
Payer: MEDICAID

## 2022-11-24 LAB
BACTERIA BLD CULT: NORMAL
BACTERIA BLD CULT: NORMAL

## 2023-02-01 DIAGNOSIS — E10.65 TYPE 1 DIABETES MELLITUS WITH HYPERGLYCEMIA: ICD-10-CM

## 2023-02-01 DIAGNOSIS — E10.9 TYPE 1 DIABETES MELLITUS WITHOUT COMPLICATION: Primary | ICD-10-CM

## 2023-02-01 RX ORDER — INSULIN DEGLUDEC 100 U/ML
INJECTION, SOLUTION SUBCUTANEOUS
Qty: 10 PEN | Refills: 0 | Status: SHIPPED | OUTPATIENT
Start: 2023-02-01 | End: 2023-06-23

## 2023-02-08 ENCOUNTER — TELEPHONE (OUTPATIENT)
Dept: ENDOCRINOLOGY | Facility: CLINIC | Age: 33
End: 2023-02-08
Payer: MEDICAID

## 2023-02-08 NOTE — TELEPHONE ENCOUNTER
Approved  Prior Authorization Portal   Prior authorization approved Case ID: [id number]      Payer:  [facility] - Managed Medicaid   Your PA request has been approved. Additional information will be provided in the approval communication. (Message 1146)     Approval Details    Authorized from February 8, 2023 to February 8, 2024

## 2023-06-03 DIAGNOSIS — E10.9 TYPE 1 DIABETES MELLITUS WITHOUT COMPLICATION: ICD-10-CM

## 2023-06-05 DIAGNOSIS — E10.9 TYPE 1 DIABETES MELLITUS WITHOUT COMPLICATION: ICD-10-CM

## 2023-06-05 DIAGNOSIS — E10.65 TYPE 1 DIABETES MELLITUS WITH HYPERGLYCEMIA: ICD-10-CM

## 2023-06-05 RX ORDER — INSULIN DETEMIR 100 [IU]/ML
INJECTION, SOLUTION SUBCUTANEOUS
Qty: 30 ML | Refills: 0 | OUTPATIENT
Start: 2023-06-05

## 2023-06-05 NOTE — TELEPHONE ENCOUNTER
Pt contacted via portal regarding f/u appt.    Refused Prescriptions     insulin aspart U-100 (NOVOLOG) 100 unit/mL (3 mL) InPn pen         Sig: To use with carb ratio 1:4, ISF: 25 MDD: 80 units    Disp:  30 mL    Refills:  11    Start: 6/6/2023    Class: Normal    Refused by: MINH Parsons PA-C    Refusal reason: Patient needs an appointment         insulin degludec (TRESIBA FLEXTOUCH U-100) 100 unit/mL (3 mL) insulin pen         Sig: Inject 15 units under the skin twice daily. MDD: 30 units    Disp:  10 pen    Refills:  0    Start: 6/6/2023    Class: Normal    Refused by: MINH Parsons PA-C    Refusal reason: Patient needs an appointment        Fill requested from: [id number] - [place], [place] - [address] AT [facility] 01/06/22. LL NONE

## 2023-06-06 RX ORDER — INSULIN ASPART 100 [IU]/ML
INJECTION, SOLUTION INTRAVENOUS; SUBCUTANEOUS
Qty: 30 ML | Refills: 11 | OUTPATIENT
Start: 2023-06-06

## 2023-06-06 RX ORDER — INSULIN DEGLUDEC 100 U/ML
INJECTION, SOLUTION SUBCUTANEOUS
Qty: 10 PEN | Refills: 0 | OUTPATIENT
Start: 2023-06-06

## 2023-06-22 DIAGNOSIS — E10.9 TYPE 1 DIABETES MELLITUS WITHOUT COMPLICATION: ICD-10-CM

## 2023-06-22 RX ORDER — INSULIN ASPART 100 [IU]/ML
INJECTION, SOLUTION INTRAVENOUS; SUBCUTANEOUS
Qty: 30 ML | Refills: 11 | OUTPATIENT
Start: 2023-06-22

## 2023-06-23 ENCOUNTER — OFFICE VISIT (OUTPATIENT)
Dept: URGENT CARE | Facility: CLINIC | Age: 33
End: 2023-06-23
Payer: MEDICAID

## 2023-06-23 VITALS
HEIGHT: 70 IN | RESPIRATION RATE: 18 BRPM | OXYGEN SATURATION: 99 % | BODY MASS INDEX: 21.47 KG/M2 | WEIGHT: 150 LBS | SYSTOLIC BLOOD PRESSURE: 114 MMHG | HEART RATE: 104 BPM | TEMPERATURE: 98 F | DIASTOLIC BLOOD PRESSURE: 78 MMHG

## 2023-06-23 DIAGNOSIS — E10.65 TYPE 1 DIABETES MELLITUS WITH HYPERGLYCEMIA: Primary | ICD-10-CM

## 2023-06-23 PROCEDURE — 99214 OFFICE O/P EST MOD 30 MIN: CPT | Mod: S$GLB,,, | Performed by: NURSE PRACTITIONER

## 2023-06-23 PROCEDURE — 99214 PR OFFICE/OUTPT VISIT, EST, LEVL IV, 30-39 MIN: ICD-10-PCS | Mod: S$GLB,,, | Performed by: NURSE PRACTITIONER

## 2023-06-23 RX ORDER — INSULIN DETEMIR 100 [IU]/ML
15 INJECTION, SOLUTION SUBCUTANEOUS 2 TIMES DAILY
Qty: 12 ML | Refills: 0 | Status: SHIPPED | OUTPATIENT
Start: 2023-06-23 | End: 2023-07-31 | Stop reason: SDUPTHER

## 2023-06-23 NOTE — PROGRESS NOTES
"Subjective:      Patient ID: Adore Estes is a 32 y.o. female.    Vitals:  height is 5' 10" (1.778 m) and weight is 68 kg (150 lb). Her oral temperature is 98.1 °F (36.7 °C). Her blood pressure is 114/78 and her pulse is 104. Her respiration is 18 and oxygen saturation is 99%.     Chief Complaint: Medication Refill    Pt states "she needs a medication refill of leva clarissa, an insulin pen."  Patient has an endocrinology appointment with her endocrinologist Dr Parsons on .   She is completely out of her Levemir as of today.   Glucose last checked before dinner yesterday, accucheck 271. Last Hga1c was over a year ago.     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 [employer]     Tobacco Use      Smoking status: Never      Smokeless tobacco: Never    Substance and Sexual Activity      Alcohol use: Yes        Alcohol/week: 3.0 standard drinks        Types: 1 Glasses of wine, 1 Cans of beer, 1 Shots of liquor per week        Comment: 2 drink per week       Drug use: No      Sexual activity: Yes        Partners: Male        Birth control/protection: None, Condom    Social Determinants of Health  Financial Resource Strain: Unknown      Difficulty of Paying Living Expenses: Patient refused  Food Insecurity: Unknown      Worried About Running Out of Food in the Last Year: Patient refused      Ran Out of Food in the Last Year: Patient refused  Transportation Needs: Unknown      Lack of Transportation (Medical): Patient refused      Lack of Transportation (Non-Medical): Patient refused  Physical " Activity: Unknown      Days of Exercise per Week: Patient refused      Minutes of Exercise per Session: Patient refused  Stress: Unknown      Feeling of Stress : Patient refused  Social Connections: Unknown      Frequency of Communication with Friends and Family: Patient refused      Frequency of Social Gatherings with Friends and Family: Patient refused      Attends Judaism Services: Patient refused      Active Member of Clubs or Organizations: Patient refused      Attends Club or Organization Meetings: Patient refused      Marital Status: Patient refused  Housing Stability: Unknown      Unable to Pay for Housing in the Last Year: Patient refused      Unstable Housing in the Last Year: Patient refused    Current Outpatient Medications:  blood sugar diagnostic Strp, Check 5x daily for hypoglycemia.  True Metrix, Disp: 500 strip, Rfl: 3  insulin aspart U-100 (NOVOLOG) 100 unit/mL (3 mL) InPn pen, To use with carb ratio 1:4, ISF: 25 MDD: 80 units, Disp: 30 mL, Rfl: 11  insulin degludec (TRESIBA FLEXTOUCH U-100) 100 unit/mL (3 mL) insulin pen, Inject 15 units under the skin twice daily. MDD: 30 units, Disp: 10 pen, Rfl: 0    No current facility-administered medications for this visit.      Review of patient's allergies indicates:   -- Cerebyx [fosphenytoin] -- Itching   -- Amoxil [amoxicillin] -- Hives     Medication Refill    Constitution: Negative.   Respiratory: Negative.     Gastrointestinal: Negative.    Musculoskeletal: Negative.    Neurological: Negative.     Objective:     Physical Exam   Constitutional: She is oriented to person, place, and time.   HENT:   Head: Normocephalic and atraumatic.   Cardiovascular: Normal rate.   Pulmonary/Chest: Effort normal. No respiratory distress.   Abdominal: Normal appearance.   Neurological: She is alert and oriented to person, place, and time.   Psychiatric: Her behavior is normal. Mood normal.     Assessment:     1. Type 1 diabetes mellitus with hyperglycemia         Plan:     30 day refill of levemir given, importance of regular follow up discussed, monitor glucoses twice daily, use sliding scale as prescribed, see endocrinology as scheduled, ED immediately for new or worsening symptom.   Type 1 diabetes mellitus with hyperglycemia  -     insulin detemir U-100, Levemir, (LEVEMIR FLEXPEN) 100 unit/mL (3 mL) InPn pen; Inject 15 Units into the skin 2 (two) times daily.  Dispense: 12 mL; Refill: 0

## 2023-07-31 ENCOUNTER — OFFICE VISIT (OUTPATIENT)
Dept: ENDOCRINOLOGY | Facility: CLINIC | Age: 33
End: 2023-07-31
Payer: MEDICAID

## 2023-07-31 ENCOUNTER — LAB VISIT (OUTPATIENT)
Dept: LAB | Facility: HOSPITAL | Age: 33
End: 2023-07-31
Payer: MEDICAID

## 2023-07-31 VITALS
DIASTOLIC BLOOD PRESSURE: 80 MMHG | WEIGHT: 145 LBS | OXYGEN SATURATION: 98 % | BODY MASS INDEX: 20.76 KG/M2 | HEART RATE: 96 BPM | HEIGHT: 70 IN | TEMPERATURE: 98 F | SYSTOLIC BLOOD PRESSURE: 110 MMHG

## 2023-07-31 DIAGNOSIS — E10.65 TYPE 1 DIABETES MELLITUS WITH HYPERGLYCEMIA: Primary | ICD-10-CM

## 2023-07-31 DIAGNOSIS — E10.9 TYPE 1 DIABETES MELLITUS WITHOUT COMPLICATION: ICD-10-CM

## 2023-07-31 DIAGNOSIS — E10.65 TYPE 1 DIABETES MELLITUS WITH HYPERGLYCEMIA: ICD-10-CM

## 2023-07-31 LAB
ALBUMIN SERPL BCP-MCNC: 3.6 G/DL (ref 3.5–5.2)
ANION GAP SERPL CALC-SCNC: 11 MMOL/L (ref 8–16)
BUN SERPL-MCNC: 10 MG/DL (ref 6–20)
CALCIUM SERPL-MCNC: 9.6 MG/DL (ref 8.7–10.5)
CHLORIDE SERPL-SCNC: 102 MMOL/L (ref 95–110)
CO2 SERPL-SCNC: 25 MMOL/L (ref 23–29)
CREAT SERPL-MCNC: 0.7 MG/DL (ref 0.5–1.4)
EST. GFR  (NO RACE VARIABLE): >60 ML/MIN/1.73 M^2
ESTIMATED AVG GLUCOSE: 341 MG/DL (ref 68–131)
GLUCOSE SERPL-MCNC: 186 MG/DL (ref 70–110)
HBA1C MFR BLD: 13.5 % (ref 4–5.6)
PHOSPHATE SERPL-MCNC: 3 MG/DL (ref 2.7–4.5)
POTASSIUM SERPL-SCNC: 4 MMOL/L (ref 3.5–5.1)
SODIUM SERPL-SCNC: 138 MMOL/L (ref 136–145)

## 2023-07-31 PROCEDURE — 1159F MED LIST DOCD IN RCRD: CPT | Mod: CPTII,,, | Performed by: PHYSICIAN ASSISTANT

## 2023-07-31 PROCEDURE — 3079F DIAST BP 80-89 MM HG: CPT | Mod: CPTII,,, | Performed by: PHYSICIAN ASSISTANT

## 2023-07-31 PROCEDURE — 1160F PR REVIEW ALL MEDS BY PRESCRIBER/CLIN PHARMACIST DOCUMENTED: ICD-10-PCS | Mod: CPTII,,, | Performed by: PHYSICIAN ASSISTANT

## 2023-07-31 PROCEDURE — 3079F PR MOST RECENT DIASTOLIC BLOOD PRESSURE 80-89 MM HG: ICD-10-PCS | Mod: CPTII,,, | Performed by: PHYSICIAN ASSISTANT

## 2023-07-31 PROCEDURE — 99999 PR PBB SHADOW E&M-EST. PATIENT-LVL III: CPT | Mod: PBBFAC,,, | Performed by: PHYSICIAN ASSISTANT

## 2023-07-31 PROCEDURE — 1160F RVW MEDS BY RX/DR IN RCRD: CPT | Mod: CPTII,,, | Performed by: PHYSICIAN ASSISTANT

## 2023-07-31 PROCEDURE — 3074F PR MOST RECENT SYSTOLIC BLOOD PRESSURE < 130 MM HG: ICD-10-PCS | Mod: CPTII,,, | Performed by: PHYSICIAN ASSISTANT

## 2023-07-31 PROCEDURE — 99214 OFFICE O/P EST MOD 30 MIN: CPT | Mod: S$PBB,,, | Performed by: PHYSICIAN ASSISTANT

## 2023-07-31 PROCEDURE — 99214 PR OFFICE/OUTPT VISIT, EST, LEVL IV, 30-39 MIN: ICD-10-PCS | Mod: S$PBB,,, | Performed by: PHYSICIAN ASSISTANT

## 2023-07-31 PROCEDURE — 80069 RENAL FUNCTION PANEL: CPT | Performed by: PHYSICIAN ASSISTANT

## 2023-07-31 PROCEDURE — 3074F SYST BP LT 130 MM HG: CPT | Mod: CPTII,,, | Performed by: PHYSICIAN ASSISTANT

## 2023-07-31 PROCEDURE — 99999 PR PBB SHADOW E&M-EST. PATIENT-LVL III: ICD-10-PCS | Mod: PBBFAC,,, | Performed by: PHYSICIAN ASSISTANT

## 2023-07-31 PROCEDURE — 99213 OFFICE O/P EST LOW 20 MIN: CPT | Mod: PBBFAC,PO | Performed by: PHYSICIAN ASSISTANT

## 2023-07-31 PROCEDURE — 3008F BODY MASS INDEX DOCD: CPT | Mod: CPTII,,, | Performed by: PHYSICIAN ASSISTANT

## 2023-07-31 PROCEDURE — 3008F PR BODY MASS INDEX (BMI) DOCUMENTED: ICD-10-PCS | Mod: CPTII,,, | Performed by: PHYSICIAN ASSISTANT

## 2023-07-31 PROCEDURE — 36415 COLL VENOUS BLD VENIPUNCTURE: CPT | Mod: PO | Performed by: PHYSICIAN ASSISTANT

## 2023-07-31 PROCEDURE — 83036 HEMOGLOBIN GLYCOSYLATED A1C: CPT | Performed by: PHYSICIAN ASSISTANT

## 2023-07-31 PROCEDURE — 1159F PR MEDICATION LIST DOCUMENTED IN MEDICAL RECORD: ICD-10-PCS | Mod: CPTII,,, | Performed by: PHYSICIAN ASSISTANT

## 2023-07-31 RX ORDER — INSULIN DETEMIR 100 [IU]/ML
15 INJECTION, SOLUTION SUBCUTANEOUS 2 TIMES DAILY
Qty: 30 ML | Refills: 3 | Status: ON HOLD | OUTPATIENT
Start: 2023-07-31 | End: 2023-09-14 | Stop reason: SDUPTHER

## 2023-07-31 RX ORDER — INSULIN ASPART 100 [IU]/ML
INJECTION, SOLUTION INTRAVENOUS; SUBCUTANEOUS
Qty: 30 ML | Refills: 11 | Status: SHIPPED | OUTPATIENT
Start: 2023-07-31 | End: 2024-03-04

## 2023-07-31 NOTE — PATIENT INSTRUCTIONS
Medication Changes  Correction factor: 20  Target: 120  Carb ratio: 4  Levemir: 15 units twice daily    Download Boluscalc valdez.

## 2023-07-31 NOTE — PROGRESS NOTES
CC: This 32 y.o. female presents for management of T1DM  and chronic conditions pending review including HTN, HLP    HPI: was diagnosed with T1DM in 1996 after experiencing polyuria and polydipsia. Recently admitted on  for DKA and switched from Medtronic 530 G to MDI.  Has been hospitalized for DKA multiple times. New to endocrine. She did have seizures in the past after having hypoglycemia. She had been on the pump since she was 15.   She had DKA in .  Family hx of DM: half sister  Fhx of thyroid disease: parents  Denies missing doses of DM medication.   hypoglycemia at home: none  monitoring BG at home:  Fastin-300  DN: 200    Diet:   BF-mini apple cinnamon muffin  LH-veggies  DN-shrimp and grits  Snacks on fruit snacks. Avoids sugary beverages.     Exercise: She dances, runs, walks and does yoga at home 5x weeky. Active at work.    Currrent Medications: Levemir 15 mg bid, ISF: 25, Target 150, CHO: 4    Standards of Care:  [facility] exam: 3/23 [facility]  Podiatry exam: n/a  DE:  While admitted in     PMHx, PSHx: reviewed in epic.  Social Hx: no E/T use. . Currently, going through a divorce. Has one son ~5 yrs old. She is working at [employer].    Wt Readings from Last 10 Encounters:   23 65.8 kg (145 lb)   23 68 kg (150 lb)   22 69.4 kg (153 lb)   22 70.8 kg (156 lb)   22 71.1 kg (156 lb 12.8 oz)   22 67.1 kg (148 lb)   22 70.1 kg (154 lb 6.9 oz)   21 68 kg (149 lb 14.6 oz)   21 65.8 kg (145 lb)   21 68.8 kg (151 lb 10.8 oz)      ROS:   Gen: Appetite good, + wt loss 9 lbs, denies fatigue and weakness.  Skin: Skin is intact and heals well, no rashes, no hair changes  Eyes: Denies visual disturbances  Resp: no SOB or CHAN, no cough  Cardiac: No palpitations, chest pain, no edema   GI: No nausea or vomiting, diarrhea, constipation, or abdominal pain.  /GYN: No nocturia, burning or pain.   MS/Neuro: Denies numbness/ tingling in BLE;  "Gait steady, speech clear  Psych: Denies drug/ETOH abuse, no hx of depression.  Other systems: negative.    /80 (BP Location: Left arm, Patient Position: Sitting, BP Method: Small (Manual))   Pulse 96   Temp 98.2 °F (36.8 °C) (Oral)   Ht 5' 10" (1.778 m)   Wt 65.8 kg (145 lb)   SpO2 98%   BMI 20.81 kg/m²      PE:  GENERAL: young female, well developed, well nourished.  PSYCH: AAOx3, appropriate mood and affect, pleasant expression, conversant, appears relaxed, well groomed.   EYES: Conjunctiva, corneas clear  VASCULAR: DP pulses +2/4 bilaterally, no edema.  NEURO: Gait steady, CN ll-Xll grossly intact   SKIN: Skin warm and dry no acanthosis nigracans.  7/23  Foot Exam: no sores or macerations noted.     Protective Sensation (w/ 10 gram monofilament):  Right: Intact  Left: Intact    Visual Inspection:  Normal -  Bilateral and Nails Intact - without Evidence of Foot Deformity- Bilateral    Pedal Pulses:   Right: Present  Left: Present    Posterior tibialis:   Right:Present  Left: Present     Vibratory Sensation  Right:Positive  Left:Positive     Personally reviewed labs below:    No visits with results within 1 Month(s) from this visit.   Latest known visit with results is:   No results displayed because visit has over 200 results.         Lab Results   Component Value Date    HGBA1C 13.3 (H) 11/18/2022         ASSESSMENT and PLAN:    1. Type 1 diabetes mellitus with hyperglycemia  Comprehensive Metabolic Panel    Hemoglobin A1C    TSH    Hemoglobin A1C    Lipid Panel    Microalbumin/Creatinine Ratio, Urine    Ambulatory referral/consult to Ophthalmology    insulin detemir U-100, Levemir, (LEVEMIR FLEXPEN) 100 unit/mL (3 mL) InPn pen    Renal Function Panel      2. Type 1 diabetes mellitus without complication  insulin aspart U-100 (NOVOLOG) 100 unit/mL (3 mL) InPn pen          T1DM with hyperglycemia-A1c today.   Medication Changes  Correction factor: 20  Target: 120  Carb ratio: 4  Levemir: 15 units " twice daily    Download Boluscalc valdez.  Declines CGM. Declines DE. Declines InPen.   Discussed DM, progression of disease, long term complications, tx options.   Discussed A1c and BG goals.   Reviewed  hypoglycemia, s/s and appropriate tx.   Instructed to monitor BG and bring meter/ log to every clinic visit.   - takes ASA, ACEi, statin    Elevated LDL-fhx of CAD. Recheck next visit.    A1c, rp  Note from [facility]  Follow-up: in 3 months with lab prior

## 2023-09-10 ENCOUNTER — HOSPITAL ENCOUNTER (INPATIENT)
Facility: HOSPITAL | Age: 33
LOS: 4 days | Discharge: HOME | DRG: 638 | End: 2023-09-14
Attending: EMERGENCY MEDICINE | Admitting: INTERNAL MEDICINE
Payer: MEDICAID

## 2023-09-10 DIAGNOSIS — E10.65 TYPE 1 DIABETES MELLITUS WITH HYPERGLYCEMIA: ICD-10-CM

## 2023-09-10 DIAGNOSIS — L02.91 ABSCESS: ICD-10-CM

## 2023-09-10 DIAGNOSIS — E10.10 DIABETIC KETOACIDOSIS WITHOUT COMA ASSOCIATED WITH TYPE 1 DIABETES MELLITUS: Primary | ICD-10-CM

## 2023-09-10 DIAGNOSIS — L02.811 SCALP ABSCESS: ICD-10-CM

## 2023-09-10 LAB
ALBUMIN SERPL BCP-MCNC: 3.9 G/DL (ref 3.5–5.2)
ALLENS TEST: ABNORMAL
ALP SERPL-CCNC: 147 U/L (ref 55–135)
ALT SERPL W/O P-5'-P-CCNC: 9 U/L (ref 10–44)
ANION GAP SERPL CALC-SCNC: 24 MMOL/L (ref 8–16)
AST SERPL-CCNC: 9 U/L (ref 10–40)
B-HCG UR QL: NEGATIVE
B-OH-BUTYR BLD STRIP-SCNC: 5.3 MMOL/L (ref 0–0.5)
BASOPHILS # BLD AUTO: 0.06 K/UL (ref 0–0.2)
BASOPHILS NFR BLD: 0.4 % (ref 0–1.9)
BILIRUB SERPL-MCNC: 0.2 MG/DL (ref 0.1–1)
BUN SERPL-MCNC: 11 MG/DL (ref 6–20)
CALCIUM SERPL-MCNC: 9.3 MG/DL (ref 8.7–10.5)
CHLORIDE SERPL-SCNC: 103 MMOL/L (ref 95–110)
CO2 SERPL-SCNC: 6 MMOL/L (ref 23–29)
CREAT SERPL-MCNC: 1.3 MG/DL (ref 0.5–1.4)
CTP QC/QA: YES
DELSYS: ABNORMAL
DIFFERENTIAL METHOD: ABNORMAL
EOSINOPHIL # BLD AUTO: 0.1 K/UL (ref 0–0.5)
EOSINOPHIL NFR BLD: 0.4 % (ref 0–8)
ERYTHROCYTE [DISTWIDTH] IN BLOOD BY AUTOMATED COUNT: 11.7 % (ref 11.5–14.5)
ERYTHROCYTE [SEDIMENTATION RATE] IN BLOOD BY WESTERGREN METHOD: 20 MM/H
EST. GFR  (NO RACE VARIABLE): 56 ML/MIN/1.73 M^2
GLUCOSE SERPL-MCNC: 478 MG/DL (ref 70–110)
HCO3 UR-SCNC: 6.5 MMOL/L (ref 24–28)
HCT VFR BLD AUTO: 48.1 % (ref 37–48.5)
HGB BLD-MCNC: 15.6 G/DL (ref 12–16)
IMM GRANULOCYTES # BLD AUTO: 0.18 K/UL (ref 0–0.04)
IMM GRANULOCYTES NFR BLD AUTO: 1.2 % (ref 0–0.5)
LIPASE SERPL-CCNC: 5 U/L (ref 4–60)
LYMPHOCYTES # BLD AUTO: 2 K/UL (ref 1–4.8)
LYMPHOCYTES NFR BLD: 13.4 % (ref 18–48)
MAGNESIUM SERPL-MCNC: 1.9 MG/DL (ref 1.6–2.6)
MCH RBC QN AUTO: 29 PG (ref 27–31)
MCHC RBC AUTO-ENTMCNC: 32.4 G/DL (ref 32–36)
MCV RBC AUTO: 89 FL (ref 82–98)
MODE: ABNORMAL
MONOCYTES # BLD AUTO: 0.7 K/UL (ref 0.3–1)
MONOCYTES NFR BLD: 4.4 % (ref 4–15)
NEUTROPHILS # BLD AUTO: 12.1 K/UL (ref 1.8–7.7)
NEUTROPHILS NFR BLD: 80.2 % (ref 38–73)
NRBC BLD-RTO: 0 /100 WBC
PCO2 BLDA: 23.7 MMHG (ref 35–45)
PH SMN: 7.04 [PH] (ref 7.35–7.45)
PHOSPHATE SERPL-MCNC: 4.1 MG/DL (ref 2.7–4.5)
PLATELET # BLD AUTO: 452 K/UL (ref 150–450)
PMV BLD AUTO: 9.1 FL (ref 9.2–12.9)
PO2 BLDA: 34 MMHG (ref 40–60)
POC BE: -24 MMOL/L
POC SATURATED O2: 43 % (ref 95–100)
POC TCO2: 7 MMOL/L (ref 24–29)
POCT GLUCOSE: 217 MG/DL (ref 70–110)
POCT GLUCOSE: 222 MG/DL (ref 70–110)
POCT GLUCOSE: 449 MG/DL (ref 70–110)
POTASSIUM SERPL-SCNC: 4.2 MMOL/L (ref 3.5–5.1)
PROT SERPL-MCNC: 8.6 G/DL (ref 6–8.4)
RBC # BLD AUTO: 5.38 M/UL (ref 4–5.4)
SAMPLE: ABNORMAL
SITE: ABNORMAL
SODIUM SERPL-SCNC: 133 MMOL/L (ref 136–145)
SP02: 99
WBC # BLD AUTO: 15.06 K/UL (ref 3.9–12.7)

## 2023-09-10 PROCEDURE — 80048 BASIC METABOLIC PNL TOTAL CA: CPT | Performed by: INTERNAL MEDICINE

## 2023-09-10 PROCEDURE — 83735 ASSAY OF MAGNESIUM: CPT | Performed by: EMERGENCY MEDICINE

## 2023-09-10 PROCEDURE — 36415 COLL VENOUS BLD VENIPUNCTURE: CPT | Performed by: EMERGENCY MEDICINE

## 2023-09-10 PROCEDURE — 82010 KETONE BODYS QUAN: CPT | Performed by: EMERGENCY MEDICINE

## 2023-09-10 PROCEDURE — 20600001 HC STEP DOWN PRIVATE ROOM

## 2023-09-10 PROCEDURE — 96374 THER/PROPH/DIAG INJ IV PUSH: CPT

## 2023-09-10 PROCEDURE — 83690 ASSAY OF LIPASE: CPT | Performed by: EMERGENCY MEDICINE

## 2023-09-10 PROCEDURE — 96361 HYDRATE IV INFUSION ADD-ON: CPT

## 2023-09-10 PROCEDURE — 85025 COMPLETE CBC W/AUTO DIFF WBC: CPT | Performed by: EMERGENCY MEDICINE

## 2023-09-10 PROCEDURE — 82803 BLOOD GASES ANY COMBINATION: CPT

## 2023-09-10 PROCEDURE — 99900035 HC TECH TIME PER 15 MIN (STAT)

## 2023-09-10 PROCEDURE — 63600175 PHARM REV CODE 636 W HCPCS: Performed by: NURSE PRACTITIONER

## 2023-09-10 PROCEDURE — 63600175 PHARM REV CODE 636 W HCPCS: Performed by: EMERGENCY MEDICINE

## 2023-09-10 PROCEDURE — 80053 COMPREHEN METABOLIC PANEL: CPT | Performed by: EMERGENCY MEDICINE

## 2023-09-10 PROCEDURE — 25000003 PHARM REV CODE 250: Performed by: NURSE PRACTITIONER

## 2023-09-10 PROCEDURE — 81025 URINE PREGNANCY TEST: CPT | Performed by: EMERGENCY MEDICINE

## 2023-09-10 PROCEDURE — 84100 ASSAY OF PHOSPHORUS: CPT | Performed by: EMERGENCY MEDICINE

## 2023-09-10 PROCEDURE — 99291 CRITICAL CARE FIRST HOUR: CPT

## 2023-09-10 PROCEDURE — 82962 GLUCOSE BLOOD TEST: CPT

## 2023-09-10 PROCEDURE — 94760 N-INVAS EAR/PLS OXIMETRY 1: CPT

## 2023-09-10 PROCEDURE — 36415 COLL VENOUS BLD VENIPUNCTURE: CPT | Performed by: INTERNAL MEDICINE

## 2023-09-10 RX ORDER — SODIUM CHLORIDE 0.9 % (FLUSH) 0.9 %
10 SYRINGE (ML) INJECTION
Status: DISCONTINUED | OUTPATIENT
Start: 2023-09-10 | End: 2023-09-14 | Stop reason: HOSPADM

## 2023-09-10 RX ORDER — ONDANSETRON 2 MG/ML
4 INJECTION INTRAMUSCULAR; INTRAVENOUS EVERY 6 HOURS PRN
Status: DISCONTINUED | OUTPATIENT
Start: 2023-09-10 | End: 2023-09-14 | Stop reason: HOSPADM

## 2023-09-10 RX ORDER — SODIUM CHLORIDE 0.9 % (FLUSH) 0.9 %
10 SYRINGE (ML) INJECTION
Status: DISCONTINUED | OUTPATIENT
Start: 2023-09-10 | End: 2023-09-10

## 2023-09-10 RX ORDER — ONDANSETRON 2 MG/ML
4 INJECTION INTRAMUSCULAR; INTRAVENOUS
Status: COMPLETED | OUTPATIENT
Start: 2023-09-10 | End: 2023-09-10

## 2023-09-10 RX ORDER — TALC
6 POWDER (GRAM) TOPICAL NIGHTLY PRN
Status: DISCONTINUED | OUTPATIENT
Start: 2023-09-10 | End: 2023-09-14 | Stop reason: HOSPADM

## 2023-09-10 RX ORDER — DEXTROSE MONOHYDRATE AND SODIUM CHLORIDE 5; .45 G/100ML; G/100ML
INJECTION, SOLUTION INTRAVENOUS CONTINUOUS PRN
Status: DISCONTINUED | OUTPATIENT
Start: 2023-09-10 | End: 2023-09-10

## 2023-09-10 RX ORDER — SODIUM CHLORIDE 9 MG/ML
125 INJECTION, SOLUTION INTRAVENOUS CONTINUOUS
Status: DISCONTINUED | OUTPATIENT
Start: 2023-09-10 | End: 2023-09-12

## 2023-09-10 RX ORDER — SODIUM CHLORIDE 9 MG/ML
1000 INJECTION, SOLUTION INTRAVENOUS CONTINUOUS
Status: DISCONTINUED | OUTPATIENT
Start: 2023-09-10 | End: 2023-09-10

## 2023-09-10 RX ORDER — DEXTROSE MONOHYDRATE AND SODIUM CHLORIDE 5; .45 G/100ML; G/100ML
125 INJECTION, SOLUTION INTRAVENOUS CONTINUOUS PRN
Status: DISCONTINUED | OUTPATIENT
Start: 2023-09-10 | End: 2023-09-13

## 2023-09-10 RX ORDER — METOCLOPRAMIDE HYDROCHLORIDE 5 MG/ML
5 INJECTION INTRAMUSCULAR; INTRAVENOUS EVERY 6 HOURS PRN
Status: DISCONTINUED | OUTPATIENT
Start: 2023-09-10 | End: 2023-09-14 | Stop reason: HOSPADM

## 2023-09-10 RX ORDER — FAMOTIDINE 10 MG/ML
20 INJECTION INTRAVENOUS EVERY 12 HOURS
Status: DISCONTINUED | OUTPATIENT
Start: 2023-09-10 | End: 2023-09-14 | Stop reason: HOSPADM

## 2023-09-10 RX ORDER — ACETAMINOPHEN 325 MG/1
650 TABLET ORAL EVERY 4 HOURS PRN
Status: DISCONTINUED | OUTPATIENT
Start: 2023-09-10 | End: 2023-09-14 | Stop reason: HOSPADM

## 2023-09-10 RX ADMIN — SODIUM CHLORIDE 125 ML/HR: 9 INJECTION, SOLUTION INTRAVENOUS at 11:09

## 2023-09-10 RX ADMIN — SODIUM CHLORIDE, POTASSIUM CHLORIDE, SODIUM LACTATE AND CALCIUM CHLORIDE 1000 ML: 600; 310; 30; 20 INJECTION, SOLUTION INTRAVENOUS at 08:09

## 2023-09-10 RX ADMIN — FAMOTIDINE 20 MG: 10 INJECTION, SOLUTION INTRAVENOUS at 09:09

## 2023-09-10 RX ADMIN — SODIUM CHLORIDE 0.1 UNITS/KG/HR: 9 INJECTION, SOLUTION INTRAVENOUS at 10:09

## 2023-09-10 RX ADMIN — SODIUM CHLORIDE, SODIUM LACTATE, POTASSIUM CHLORIDE, AND CALCIUM CHLORIDE 1000 ML: .6; .31; .03; .02 INJECTION, SOLUTION INTRAVENOUS at 09:09

## 2023-09-10 RX ADMIN — ONDANSETRON 4 MG: 2 INJECTION INTRAMUSCULAR; INTRAVENOUS at 08:09

## 2023-09-10 RX ADMIN — VANCOMYCIN HYDROCHLORIDE 1000 MG: 1 INJECTION, POWDER, LYOPHILIZED, FOR SOLUTION INTRAVENOUS at 11:09

## 2023-09-10 NOTE — LETTER
September 14, 2023         [address] [place] [address] [address] IBETH [address]TE [id number]       Patient: Adore Estes   YOB: 1990  Date of Visit: 09/10/2023- 09/14/2023    To Whom It May Concern:    Curtis Estes  was at [facility] on 09/10/23- 09/14/2023. The patient may return to work on 09/19/2023 with no restrictions. If you have any questions or concerns, or if I can be of further assistance, please do not hesitate to contact me.    Sincerely,    Ciaran Acosta RN

## 2023-09-11 ENCOUNTER — ANESTHESIA EVENT (OUTPATIENT)
Dept: SURGERY | Facility: HOSPITAL | Age: 33
DRG: 638 | End: 2023-09-11
Payer: MEDICAID

## 2023-09-11 ENCOUNTER — ANESTHESIA (OUTPATIENT)
Dept: SURGERY | Facility: HOSPITAL | Age: 33
DRG: 638 | End: 2023-09-11
Payer: MEDICAID

## 2023-09-11 LAB
ANION GAP SERPL CALC-SCNC: 10 MMOL/L (ref 8–16)
ANION GAP SERPL CALC-SCNC: 11 MMOL/L (ref 8–16)
ANION GAP SERPL CALC-SCNC: 13 MMOL/L (ref 8–16)
ANION GAP SERPL CALC-SCNC: 16 MMOL/L (ref 8–16)
BASOPHILS # BLD AUTO: 0.05 K/UL (ref 0–0.2)
BASOPHILS NFR BLD: 0.4 % (ref 0–1.9)
BUN SERPL-MCNC: 10 MG/DL (ref 6–20)
BUN SERPL-MCNC: 5 MG/DL (ref 6–20)
BUN SERPL-MCNC: 8 MG/DL (ref 6–20)
BUN SERPL-MCNC: 8 MG/DL (ref 6–20)
CALCIUM SERPL-MCNC: 8 MG/DL (ref 8.7–10.5)
CALCIUM SERPL-MCNC: 8.1 MG/DL (ref 8.7–10.5)
CALCIUM SERPL-MCNC: 8.3 MG/DL (ref 8.7–10.5)
CALCIUM SERPL-MCNC: 9.2 MG/DL (ref 8.7–10.5)
CHLORIDE SERPL-SCNC: 107 MMOL/L (ref 95–110)
CHLORIDE SERPL-SCNC: 110 MMOL/L (ref 95–110)
CHLORIDE SERPL-SCNC: 110 MMOL/L (ref 95–110)
CHLORIDE SERPL-SCNC: 112 MMOL/L (ref 95–110)
CO2 SERPL-SCNC: 10 MMOL/L (ref 23–29)
CO2 SERPL-SCNC: 12 MMOL/L (ref 23–29)
CO2 SERPL-SCNC: 14 MMOL/L (ref 23–29)
CO2 SERPL-SCNC: 8 MMOL/L (ref 23–29)
CREAT SERPL-MCNC: 0.8 MG/DL (ref 0.5–1.4)
DIFFERENTIAL METHOD: ABNORMAL
EOSINOPHIL # BLD AUTO: 0 K/UL (ref 0–0.5)
EOSINOPHIL NFR BLD: 0.3 % (ref 0–8)
ERYTHROCYTE [DISTWIDTH] IN BLOOD BY AUTOMATED COUNT: 11.7 % (ref 11.5–14.5)
EST. GFR  (NO RACE VARIABLE): >60 ML/MIN/1.73 M^2
GLUCOSE SERPL-MCNC: 156 MG/DL (ref 70–110)
GLUCOSE SERPL-MCNC: 186 MG/DL (ref 70–110)
GLUCOSE SERPL-MCNC: 233 MG/DL (ref 70–110)
GLUCOSE SERPL-MCNC: 253 MG/DL (ref 70–110)
HCT VFR BLD AUTO: 38.5 % (ref 37–48.5)
HGB BLD-MCNC: 13 G/DL (ref 12–16)
IMM GRANULOCYTES # BLD AUTO: 0.09 K/UL (ref 0–0.04)
IMM GRANULOCYTES NFR BLD AUTO: 0.8 % (ref 0–0.5)
LYMPHOCYTES # BLD AUTO: 2 K/UL (ref 1–4.8)
LYMPHOCYTES NFR BLD: 16.8 % (ref 18–48)
MCH RBC QN AUTO: 29.1 PG (ref 27–31)
MCHC RBC AUTO-ENTMCNC: 33.8 G/DL (ref 32–36)
MCV RBC AUTO: 86 FL (ref 82–98)
MONOCYTES # BLD AUTO: 0.8 K/UL (ref 0.3–1)
MONOCYTES NFR BLD: 6.3 % (ref 4–15)
NEUTROPHILS # BLD AUTO: 9 K/UL (ref 1.8–7.7)
NEUTROPHILS NFR BLD: 75.4 % (ref 38–73)
NRBC BLD-RTO: 0 /100 WBC
PHOSPHATE SERPL-MCNC: 2.2 MG/DL (ref 2.7–4.5)
PLATELET # BLD AUTO: 322 K/UL (ref 150–450)
PMV BLD AUTO: 8.9 FL (ref 9.2–12.9)
POCT GLUCOSE: 133 MG/DL (ref 70–110)
POCT GLUCOSE: 141 MG/DL (ref 70–110)
POCT GLUCOSE: 142 MG/DL (ref 70–110)
POCT GLUCOSE: 147 MG/DL (ref 70–110)
POCT GLUCOSE: 147 MG/DL (ref 70–110)
POCT GLUCOSE: 151 MG/DL (ref 70–110)
POCT GLUCOSE: 153 MG/DL (ref 70–110)
POCT GLUCOSE: 159 MG/DL (ref 70–110)
POCT GLUCOSE: 161 MG/DL (ref 70–110)
POCT GLUCOSE: 163 MG/DL (ref 70–110)
POCT GLUCOSE: 164 MG/DL (ref 70–110)
POCT GLUCOSE: 165 MG/DL (ref 70–110)
POCT GLUCOSE: 166 MG/DL (ref 70–110)
POCT GLUCOSE: 166 MG/DL (ref 70–110)
POCT GLUCOSE: 167 MG/DL (ref 70–110)
POCT GLUCOSE: 176 MG/DL (ref 70–110)
POCT GLUCOSE: 176 MG/DL (ref 70–110)
POCT GLUCOSE: 187 MG/DL (ref 70–110)
POCT GLUCOSE: 241 MG/DL (ref 70–110)
POCT GLUCOSE: 243 MG/DL (ref 70–110)
POCT GLUCOSE: 253 MG/DL (ref 70–110)
POCT GLUCOSE: 264 MG/DL (ref 70–110)
POTASSIUM SERPL-SCNC: 3.2 MMOL/L (ref 3.5–5.1)
POTASSIUM SERPL-SCNC: 3.3 MMOL/L (ref 3.5–5.1)
POTASSIUM SERPL-SCNC: 3.5 MMOL/L (ref 3.5–5.1)
POTASSIUM SERPL-SCNC: 4.1 MMOL/L (ref 3.5–5.1)
RBC # BLD AUTO: 4.47 M/UL (ref 4–5.4)
SODIUM SERPL-SCNC: 131 MMOL/L (ref 136–145)
SODIUM SERPL-SCNC: 134 MMOL/L (ref 136–145)
WBC # BLD AUTO: 11.96 K/UL (ref 3.9–12.7)

## 2023-09-11 PROCEDURE — 36000704 HC OR TIME LEV I 1ST 15 MIN: Performed by: SURGERY

## 2023-09-11 PROCEDURE — 63600175 PHARM REV CODE 636 W HCPCS: Performed by: SURGERY

## 2023-09-11 PROCEDURE — 80048 BASIC METABOLIC PNL TOTAL CA: CPT | Mod: 91

## 2023-09-11 PROCEDURE — 94761 N-INVAS EAR/PLS OXIMETRY MLT: CPT

## 2023-09-11 PROCEDURE — 25000003 PHARM REV CODE 250: Performed by: NURSE PRACTITIONER

## 2023-09-11 PROCEDURE — S5010 5% DEXTROSE AND 0.45% SALINE: HCPCS | Performed by: NURSE PRACTITIONER

## 2023-09-11 PROCEDURE — 25000003 PHARM REV CODE 250: Performed by: SURGERY

## 2023-09-11 PROCEDURE — 99223 PR INITIAL HOSPITAL CARE,LEVL III: ICD-10-PCS | Mod: 25,S$GLB,, | Performed by: SURGERY

## 2023-09-11 PROCEDURE — 87147 CULTURE TYPE IMMUNOLOGIC: CPT | Mod: 59 | Performed by: SURGERY

## 2023-09-11 PROCEDURE — 63600175 PHARM REV CODE 636 W HCPCS: Performed by: NURSE PRACTITIONER

## 2023-09-11 PROCEDURE — 10061 I&D ABSCESS COMP/MULTIPLE: CPT | Mod: ,,, | Performed by: SURGERY

## 2023-09-11 PROCEDURE — 36000705 HC OR TIME LEV I EA ADD 15 MIN: Performed by: SURGERY

## 2023-09-11 PROCEDURE — 36410 VNPNXR 3YR/> PHY/QHP DX/THER: CPT

## 2023-09-11 PROCEDURE — 76937 US GUIDE VASCULAR ACCESS: CPT

## 2023-09-11 PROCEDURE — C1751 CATH, INF, PER/CENT/MIDLINE: HCPCS

## 2023-09-11 PROCEDURE — 20600001 HC STEP DOWN PRIVATE ROOM

## 2023-09-11 PROCEDURE — 25000003 PHARM REV CODE 250: Performed by: ANESTHESIOLOGY

## 2023-09-11 PROCEDURE — 27201423 OPTIME MED/SURG SUP & DEVICES STERILE SUPPLY: Performed by: SURGERY

## 2023-09-11 PROCEDURE — 87186 SC STD MICRODIL/AGAR DIL: CPT | Performed by: SURGERY

## 2023-09-11 PROCEDURE — 87075 CULTR BACTERIA EXCEPT BLOOD: CPT | Performed by: SURGERY

## 2023-09-11 PROCEDURE — 25000003 PHARM REV CODE 250: Performed by: NURSE ANESTHETIST, CERTIFIED REGISTERED

## 2023-09-11 PROCEDURE — 37000009 HC ANESTHESIA EA ADD 15 MINS: Performed by: SURGERY

## 2023-09-11 PROCEDURE — 99223 1ST HOSP IP/OBS HIGH 75: CPT | Mod: 25,S$GLB,, | Performed by: SURGERY

## 2023-09-11 PROCEDURE — 36415 COLL VENOUS BLD VENIPUNCTURE: CPT

## 2023-09-11 PROCEDURE — D9220A PRA ANESTHESIA: ICD-10-PCS | Mod: CRNA,,, | Performed by: NURSE ANESTHETIST, CERTIFIED REGISTERED

## 2023-09-11 PROCEDURE — 87205 SMEAR GRAM STAIN: CPT | Performed by: SURGERY

## 2023-09-11 PROCEDURE — 71000033 HC RECOVERY, INTIAL HOUR: Performed by: SURGERY

## 2023-09-11 PROCEDURE — 36415 COLL VENOUS BLD VENIPUNCTURE: CPT | Performed by: NURSE PRACTITIONER

## 2023-09-11 PROCEDURE — 63600175 PHARM REV CODE 636 W HCPCS: Performed by: NURSE ANESTHETIST, CERTIFIED REGISTERED

## 2023-09-11 PROCEDURE — 84100 ASSAY OF PHOSPHORUS: CPT | Performed by: NURSE PRACTITIONER

## 2023-09-11 PROCEDURE — 87070 CULTURE OTHR SPECIMN AEROBIC: CPT | Performed by: SURGERY

## 2023-09-11 PROCEDURE — 10061 PR DRAIN SKIN ABSCESS COMPLIC: ICD-10-PCS | Mod: ,,, | Performed by: SURGERY

## 2023-09-11 PROCEDURE — 36415 COLL VENOUS BLD VENIPUNCTURE: CPT | Performed by: INTERNAL MEDICINE

## 2023-09-11 PROCEDURE — D9220A PRA ANESTHESIA: Mod: CRNA,,, | Performed by: NURSE ANESTHETIST, CERTIFIED REGISTERED

## 2023-09-11 PROCEDURE — 87077 CULTURE AEROBIC IDENTIFY: CPT | Performed by: SURGERY

## 2023-09-11 PROCEDURE — D9220A PRA ANESTHESIA: ICD-10-PCS | Mod: ANES,,, | Performed by: ANESTHESIOLOGY

## 2023-09-11 PROCEDURE — 80048 BASIC METABOLIC PNL TOTAL CA: CPT | Mod: 91 | Performed by: INTERNAL MEDICINE

## 2023-09-11 PROCEDURE — 37000008 HC ANESTHESIA 1ST 15 MINUTES: Performed by: SURGERY

## 2023-09-11 PROCEDURE — 63600175 PHARM REV CODE 636 W HCPCS

## 2023-09-11 PROCEDURE — D9220A PRA ANESTHESIA: Mod: ANES,,, | Performed by: ANESTHESIOLOGY

## 2023-09-11 PROCEDURE — 27200651 HC AIRWAY, LMA: Performed by: ANESTHESIOLOGY

## 2023-09-11 PROCEDURE — 85025 COMPLETE CBC W/AUTO DIFF WBC: CPT | Performed by: NURSE PRACTITIONER

## 2023-09-11 PROCEDURE — 80202 ASSAY OF VANCOMYCIN: CPT | Performed by: INTERNAL MEDICINE

## 2023-09-11 RX ORDER — POTASSIUM CHLORIDE 7.45 MG/ML
60 INJECTION INTRAVENOUS
Status: DISCONTINUED | OUTPATIENT
Start: 2023-09-11 | End: 2023-09-14 | Stop reason: HOSPADM

## 2023-09-11 RX ORDER — LIDOCAINE HYDROCHLORIDE 20 MG/ML
INJECTION INTRAVENOUS
Status: DISCONTINUED | OUTPATIENT
Start: 2023-09-11 | End: 2023-09-11

## 2023-09-11 RX ORDER — MAGNESIUM SULFATE HEPTAHYDRATE 40 MG/ML
4 INJECTION, SOLUTION INTRAVENOUS
Status: DISCONTINUED | OUTPATIENT
Start: 2023-09-11 | End: 2023-09-14 | Stop reason: HOSPADM

## 2023-09-11 RX ORDER — CALCIUM GLUCONATE 20 MG/ML
1 INJECTION, SOLUTION INTRAVENOUS
Status: DISCONTINUED | OUTPATIENT
Start: 2023-09-11 | End: 2023-09-14 | Stop reason: HOSPADM

## 2023-09-11 RX ORDER — DEXAMETHASONE SODIUM PHOSPHATE 4 MG/ML
INJECTION, SOLUTION INTRA-ARTICULAR; INTRALESIONAL; INTRAMUSCULAR; INTRAVENOUS; SOFT TISSUE
Status: DISCONTINUED | OUTPATIENT
Start: 2023-09-11 | End: 2023-09-11

## 2023-09-11 RX ORDER — ONDANSETRON 2 MG/ML
INJECTION INTRAMUSCULAR; INTRAVENOUS
Status: DISCONTINUED | OUTPATIENT
Start: 2023-09-11 | End: 2023-09-11

## 2023-09-11 RX ORDER — OXYCODONE HYDROCHLORIDE 5 MG/1
5 TABLET ORAL ONCE AS NEEDED
Status: DISCONTINUED | OUTPATIENT
Start: 2023-09-11 | End: 2023-09-14 | Stop reason: HOSPADM

## 2023-09-11 RX ORDER — DEXTROSE MONOHYDRATE, SODIUM CHLORIDE, AND POTASSIUM CHLORIDE 50; 1.49; 4.5 G/1000ML; G/1000ML; G/1000ML
INJECTION, SOLUTION INTRAVENOUS CONTINUOUS
Status: DISCONTINUED | OUTPATIENT
Start: 2023-09-11 | End: 2023-09-12

## 2023-09-11 RX ORDER — HYDROCODONE BITARTRATE AND ACETAMINOPHEN 5; 325 MG/1; MG/1
1 TABLET ORAL EVERY 4 HOURS PRN
Status: DISCONTINUED | OUTPATIENT
Start: 2023-09-11 | End: 2023-09-14 | Stop reason: HOSPADM

## 2023-09-11 RX ORDER — MAGNESIUM SULFATE HEPTAHYDRATE 40 MG/ML
2 INJECTION, SOLUTION INTRAVENOUS
Status: DISCONTINUED | OUTPATIENT
Start: 2023-09-11 | End: 2023-09-14 | Stop reason: HOSPADM

## 2023-09-11 RX ORDER — POTASSIUM CHLORIDE 7.45 MG/ML
80 INJECTION INTRAVENOUS
Status: DISCONTINUED | OUTPATIENT
Start: 2023-09-11 | End: 2023-09-14 | Stop reason: HOSPADM

## 2023-09-11 RX ORDER — ACETAMINOPHEN 10 MG/ML
INJECTION, SOLUTION INTRAVENOUS
Status: DISCONTINUED | OUTPATIENT
Start: 2023-09-11 | End: 2023-09-11

## 2023-09-11 RX ORDER — MIDAZOLAM HYDROCHLORIDE 1 MG/ML
INJECTION INTRAMUSCULAR; INTRAVENOUS
Status: DISCONTINUED | OUTPATIENT
Start: 2023-09-11 | End: 2023-09-11

## 2023-09-11 RX ORDER — FENTANYL CITRATE 50 UG/ML
25 INJECTION, SOLUTION INTRAMUSCULAR; INTRAVENOUS EVERY 5 MIN PRN
Status: DISCONTINUED | OUTPATIENT
Start: 2023-09-11 | End: 2023-09-14 | Stop reason: HOSPADM

## 2023-09-11 RX ORDER — POTASSIUM CHLORIDE 7.45 MG/ML
40 INJECTION INTRAVENOUS
Status: DISCONTINUED | OUTPATIENT
Start: 2023-09-11 | End: 2023-09-14 | Stop reason: HOSPADM

## 2023-09-11 RX ORDER — MUPIROCIN 20 MG/G
1 OINTMENT TOPICAL 2 TIMES DAILY
Status: DISCONTINUED | OUTPATIENT
Start: 2023-09-11 | End: 2023-09-14 | Stop reason: HOSPADM

## 2023-09-11 RX ORDER — FENTANYL CITRATE 50 UG/ML
INJECTION, SOLUTION INTRAMUSCULAR; INTRAVENOUS
Status: DISCONTINUED | OUTPATIENT
Start: 2023-09-11 | End: 2023-09-11

## 2023-09-11 RX ORDER — ONDANSETRON 2 MG/ML
4 INJECTION INTRAMUSCULAR; INTRAVENOUS ONCE AS NEEDED
Status: DISCONTINUED | OUTPATIENT
Start: 2023-09-11 | End: 2023-09-14 | Stop reason: HOSPADM

## 2023-09-11 RX ORDER — CALCIUM GLUCONATE 20 MG/ML
2 INJECTION, SOLUTION INTRAVENOUS
Status: DISCONTINUED | OUTPATIENT
Start: 2023-09-11 | End: 2023-09-14 | Stop reason: HOSPADM

## 2023-09-11 RX ORDER — PROPOFOL 10 MG/ML
VIAL (ML) INTRAVENOUS
Status: DISCONTINUED | OUTPATIENT
Start: 2023-09-11 | End: 2023-09-11

## 2023-09-11 RX ORDER — CALCIUM GLUCONATE 20 MG/ML
3 INJECTION, SOLUTION INTRAVENOUS
Status: DISCONTINUED | OUTPATIENT
Start: 2023-09-11 | End: 2023-09-14 | Stop reason: HOSPADM

## 2023-09-11 RX ADMIN — POTASSIUM CHLORIDE 10 MEQ: 7.46 INJECTION, SOLUTION INTRAVENOUS at 02:09

## 2023-09-11 RX ADMIN — DEXAMETHASONE SODIUM PHOSPHATE 4 MG: 4 INJECTION, SOLUTION INTRA-ARTICULAR; INTRALESIONAL; INTRAMUSCULAR; INTRAVENOUS; SOFT TISSUE at 08:09

## 2023-09-11 RX ADMIN — FENTANYL CITRATE 50 MCG: 50 INJECTION, SOLUTION INTRAMUSCULAR; INTRAVENOUS at 08:09

## 2023-09-11 RX ADMIN — GLYCOPYRROLATE 0.2 MG: 0.2 INJECTION, SOLUTION INTRAMUSCULAR; INTRAVITREAL at 08:09

## 2023-09-11 RX ADMIN — FAMOTIDINE 20 MG: 10 INJECTION, SOLUTION INTRAVENOUS at 08:09

## 2023-09-11 RX ADMIN — POTASSIUM CHLORIDE 10 MEQ: 7.46 INJECTION, SOLUTION INTRAVENOUS at 04:09

## 2023-09-11 RX ADMIN — ONDANSETRON 4 MG: 2 INJECTION INTRAMUSCULAR; INTRAVENOUS at 08:09

## 2023-09-11 RX ADMIN — DEXTROSE, SODIUM CHLORIDE, AND POTASSIUM CHLORIDE: 5; .45; .15 INJECTION INTRAVENOUS at 01:09

## 2023-09-11 RX ADMIN — POTASSIUM CHLORIDE 10 MEQ: 7.46 INJECTION, SOLUTION INTRAVENOUS at 05:09

## 2023-09-11 RX ADMIN — FAMOTIDINE 20 MG: 10 INJECTION, SOLUTION INTRAVENOUS at 10:09

## 2023-09-11 RX ADMIN — POTASSIUM CHLORIDE 10 MEQ: 7.46 INJECTION, SOLUTION INTRAVENOUS at 07:09

## 2023-09-11 RX ADMIN — LIDOCAINE HYDROCHLORIDE 75 MG: 20 INJECTION, SOLUTION INTRAVENOUS at 08:09

## 2023-09-11 RX ADMIN — SODIUM CHLORIDE, SODIUM GLUCONATE, SODIUM ACETATE, POTASSIUM CHLORIDE, MAGNESIUM CHLORIDE, SODIUM PHOSPHATE, DIBASIC, AND POTASSIUM PHOSPHATE: .53; .5; .37; .037; .03; .012; .00082 INJECTION, SOLUTION INTRAVENOUS at 08:09

## 2023-09-11 RX ADMIN — SODIUM CHLORIDE 0.05 UNITS/KG/HR: 9 INJECTION, SOLUTION INTRAVENOUS at 11:09

## 2023-09-11 RX ADMIN — ACETAMINOPHEN 1000 MG: 10 INJECTION, SOLUTION INTRAVENOUS at 08:09

## 2023-09-11 RX ADMIN — DEXTROSE, SODIUM CHLORIDE, AND POTASSIUM CHLORIDE: 5; .45; .15 INJECTION INTRAVENOUS at 07:09

## 2023-09-11 RX ADMIN — VANCOMYCIN HYDROCHLORIDE 1000 MG: 1 INJECTION, POWDER, LYOPHILIZED, FOR SOLUTION INTRAVENOUS at 11:09

## 2023-09-11 RX ADMIN — PROPOFOL 180 MG: 10 INJECTION, EMULSION INTRAVENOUS at 08:09

## 2023-09-11 RX ADMIN — POTASSIUM CHLORIDE 10 MEQ: 7.46 INJECTION, SOLUTION INTRAVENOUS at 03:09

## 2023-09-11 RX ADMIN — DEXTROSE AND SODIUM CHLORIDE 125 ML/HR: 5; 450 INJECTION, SOLUTION INTRAVENOUS at 03:09

## 2023-09-11 RX ADMIN — SODIUM CHLORIDE 0.05 UNITS/KG/HR: 9 INJECTION, SOLUTION INTRAVENOUS at 10:09

## 2023-09-11 RX ADMIN — VANCOMYCIN HYDROCHLORIDE 1000 MG: 1 INJECTION, POWDER, LYOPHILIZED, FOR SOLUTION INTRAVENOUS at 10:09

## 2023-09-11 RX ADMIN — MUPIROCIN 1 G: 20 OINTMENT TOPICAL at 10:09

## 2023-09-11 RX ADMIN — MIDAZOLAM HYDROCHLORIDE 2 MG: 1 INJECTION, SOLUTION INTRAMUSCULAR; INTRAVENOUS at 08:09

## 2023-09-11 RX ADMIN — SODIUM PHOSPHATE, MONOBASIC, MONOHYDRATE AND SODIUM PHOSPHATE, DIBASIC, ANHYDROUS 20.01 MMOL: 142; 276 INJECTION, SOLUTION INTRAVENOUS at 06:09

## 2023-09-11 NOTE — H&P
[facility] Medicine  History & Physical    Patient Name: Adore Estes  MRN: [id number]  Patient Class: IP- Inpatient  Admission Date: 9/10/2023  Attending Physician: Cyndie Lloyd MD   Primary Care Provider: Octavio Goodwin MD         Patient information was obtained from patient, past medical records and ER records.     Subjective:     Principal Problem:<principal problem not specified>    Chief Complaint:   Chief Complaint   Patient presents with    Nausea     Patient states that she is in diabetic ketoacidosis.    Vomiting    Hyperglycemia        HPI: Adore Estes a 32-year-old female with a past medical history of IDDM type 1 who presents wit and abdominal cramping that started this morning.  Patient was seen in the ED yesterday for laceration to her scalp.  She underwent an I&D and was discharged on p.o. doxycycline.  She reports she was able to take her morning dose of doxycycline, but unable to tolerate p.o. in the evening.  Patient denies fever and worsening drainage from her scalp abscess.  Patient has a history of DKA and states her symptoms today are similar.  ED workup revealed leukocytosis 15.06, and glucose 478.  Potassium 4.2, and sodium 133.  VBG revealed acidosis.  Patient received IV bolus NS and was referred to Hospital Medicine.  Patient seen in the ED with mother at bedside.  She reports feeling weak, nauseated with abdominal cramping.  She denies chest pain, shortness of breath, fever, chills, diarrhea and dysuria.  Patient will be to Hospital Medicine for further evaluation and management.            Past Medical History:   Diagnosis Date    Diabetes mellitus     Diabetes mellitus type I     Diabetes mellitus, type 2     Insulin pump in place     Seizures     last seizure         Past Surgical History:   Procedure Laterality Date    APPENDECTOMY  2017     SECTION         Review of patient's allergies indicates:   Allergen Reactions     Cerebyx [fosphenytoin] Itching    Amoxicillin Hives       No current facility-administered medications on file prior to encounter.     Current Outpatient Medications on File Prior to Encounter   Medication Sig    blood sugar diagnostic Strp Check 5x daily for hypoglycemia.  True Metrix    doxycycline (VIBRAMYCIN) 100 MG Cap Take 1 capsule (100 mg total) by mouth 2 (two) times daily. for 10 days    insulin aspart U-100 (NOVOLOG) 100 unit/mL (3 mL) InPn pen To use with carb ratio 1:4, ISF: 20 MDD: 80 units    insulin detemir U-100, Levemir, (LEVEMIR FLEXPEN) 100 unit/mL (3 mL) InPn pen Inject 15 Units into the skin 2 (two) times daily.     Family History       Problem Relation (Age of Onset)    Asthma Sister    Breast cancer Maternal Aunt          Tobacco Use    Smoking status: Never    Smokeless tobacco: Never   Substance and Sexual Activity    Alcohol use: Yes     Alcohol/week: 3.0 standard drinks of alcohol     Types: 1 Glasses of wine, 1 Cans of beer, 1 Shots of liquor per week     Comment: 2 drink per week     Drug use: No    Sexual activity: Yes     Partners: Male     Birth control/protection: None, Condom     Review of Systems   Constitutional:  Negative for activity change, appetite change, chills and fever.   HENT:  Negative for congestion, sore throat and trouble swallowing.    Eyes:  Negative for photophobia and visual disturbance.   Respiratory:  Negative for cough, chest tightness and shortness of breath.    Cardiovascular:  Negative for chest pain, palpitations and leg swelling.   Gastrointestinal:  Positive for abdominal pain, nausea and vomiting. Negative for diarrhea.   Genitourinary:  Negative for dysuria, flank pain and hematuria.   Musculoskeletal:  Negative for back pain.   Neurological:  Positive for weakness. Negative for dizziness and headaches.   Psychiatric/Behavioral:  Negative for confusion.      Objective:     Vital Signs (Most Recent):  Temp: 97.8 °F (36.6 °C) (09/10/23  1959)  Pulse: 107 (09/10/23 2040)  Resp: (!) 24 (09/10/23 2031)  BP: (!) 149/84 (09/10/23 2040)  SpO2: 100 % (09/10/23 2040) Vital Signs (24h Range):  Temp:  [97.8 °F (36.6 °C)] 97.8 °F (36.6 °C)  Pulse:  [107-123] 107  Resp:  [24] 24  SpO2:  [99 %-100 %] 100 %  BP: (136-149)/(83-84) 149/84     Weight: 65.8 kg (145 lb)  Body mass index is 20.81 kg/m².     Physical Exam  Vitals reviewed.   Constitutional:       Appearance: Normal appearance. She is normal weight. She is ill-appearing.   HENT:      Head: Normocephalic.        Comments: Scalp abscess (see photo in chart)     Mouth/Throat:      Mouth: Mucous membranes are moist.      Pharynx: Oropharynx is clear.   Eyes:      Pupils: Pupils are equal, round, and reactive to light.   Cardiovascular:      Rate and Rhythm: Regular rhythm. Tachycardia present.      Pulses: Normal pulses.      Heart sounds: Normal heart sounds.   Pulmonary:      Effort: Pulmonary effort is normal.      Breath sounds: Normal breath sounds.   Abdominal:      General: Bowel sounds are normal.      Palpations: Abdomen is soft.   Musculoskeletal:         General: Normal range of motion.      Cervical back: Normal range of motion.   Skin:     General: Skin is warm and dry.   Neurological:      Mental Status: She is alert and oriented to person, place, and time. Mental status is at baseline.   Psychiatric:         Mood and Affect: Mood normal.              CRANIAL NERVES     CN III, IV, VI   Pupils are equal, round, and reactive to light.       Significant Labs: All pertinent labs within the past 24 hours have been reviewed.  ABGs:   Recent Labs   Lab 09/09/23  1929 09/10/23  2031   PH 7.331* 7.044*   PCO2 30.1* 23.7*   HCO3 15.9* 6.5*   POCSATURATED 77* 43*   BE -10 -24   PO2 43 34*     CBC:   Recent Labs   Lab 09/09/23  1822 09/10/23  2019   WBC 10.86 15.06*   HGB 14.5 15.6   HCT 42.2 48.1    452*     CMP:   Recent Labs   Lab 09/09/23 1822 09/10/23  2019    133*   K 4.0 4.2     103   CO2 16* 6*   * 478*   BUN 8 11   CREATININE 0.7 1.3   CALCIUM 9.6 9.3   PROT  --  8.6*   ALBUMIN  --  3.9   BILITOT  --  0.2   ALKPHOS  --  147*   AST  --  9*   ALT  --  9*   ANIONGAP 17* 24*       Significant Imaging: I have reviewed all pertinent imaging results/findings within the past 24 hours.    Assessment/Plan:     Abscess  Scalp abscess s/p I & D    -Vancomycin    Type 1 diabetes mellitus with ketoacidosis without coma  Patient's FSGs are uncontrolled due to hyperglycemia on current medication regimen.  Last A1c reviewed-   Lab Results   Component Value Date    HGBA1C 13.5 (H) 07/31/2023     Most recent fingerstick glucose reviewed-   Recent Labs   Lab 09/10/23  2023   POCTGLUCOSE 449*     Current correctional scale  insulin infusion as below for DKA  Maintain anti-hyperglycemic dose as follows-   Antihyperglycemics (From admission, onward)    Start     Stop Route Frequency Ordered    09/10/23 2215  insulin regular bolus from bag/infusion 6.58 Units 6.58 mL         -- IV Once 09/10/23 2116    09/10/23 2130  insulin regular 1 Units/mL in sodium chloride 0.9% 100 mL infusion        Question Answer Comment   Initial dose (DO NOT CHANGE): 0.1 units/kg/hr    Insulin Rate Adjustment (DO NOT MODIFY ANSWER) [email or web address] DKA version [email or web address]        -- IV Continuous 09/10/23 2116        Hold Oral hypoglycemics while patient is in the hospital.          VTE Risk Mitigation (From admission, onward)         Ordered     IP VTE HIGH RISK PATIENT  Once         09/10/23 2116     Place KIANA hose  Until discontinued         09/10/23 2116     Place sequential compression device  Until discontinued         09/10/23 2116                     Kassi Roman NP  Department of Hospital Medicine  [facility]

## 2023-09-11 NOTE — PROGRESS NOTES
[facility] Medicine  Progress Note    Patient Name: Adore Estes  MRN: [id number]  Patient Class: IP- Inpatient   Admission Date: 9/10/2023  Length of Stay: 1 days  Attending Physician: Cyndie Lloyd MD  Primary Care Provider: Octavio Goodwin MD        Subjective:     Principal Problem:Type 1 diabetes mellitus with ketoacidosis without coma        HPI:  Adore Estes a 32-year-old female with a past medical history of IDDM type 1 who presents wit and abdominal cramping that started this morning.  Patient was seen in the ED yesterday for laceration to her scalp.  She underwent an I&D and was discharged on p.o. doxycycline.  She reports she was able to take her morning dose of doxycycline, but unable to tolerate p.o. in the evening.  Patient denies fever and worsening drainage from her scalp abscess.  Patient has a history of DKA and states her symptoms today are similar.  ED workup revealed leukocytosis 15.06, and glucose 478.  Potassium 4.2, and sodium 133.  VBG revealed acidosis.  Patient received IV bolus NS and was referred to Hospital Medicine.  Patient seen in the ED with mother at bedside.  She reports feeling weak, nauseated with abdominal cramping.  She denies chest pain, shortness of breath, fever, chills, diarrhea and dysuria.  Patient will be to Hospital Medicine for further evaluation and management.            Overview/Hospital Course:  No notes on file    Interval History: Patient seen examined.  Overnight notes reviewed.   Patient remains on DKA pathway.  Trending BMP.  Anion gap closed however patient remains acidotic.   Patient has abscess to scalp with purulent drainage.  Wound care in general surgery consulted.  Patient remains on IV vanc.    Review of Systems   Constitutional:  Positive for fatigue.   Respiratory:  Negative for shortness of breath.    Cardiovascular:  Negative for chest pain.   Gastrointestinal:  Negative for abdominal pain and nausea.   Skin:   Positive for wound.     Objective:     Vital Signs (Most Recent):  Temp: 97.6 °F (36.4 °C) (09/11/23 0800)  Pulse: 80 (09/11/23 1300)  Resp: 17 (09/11/23 1300)  BP: 124/74 (09/11/23 1300)  SpO2: 100 % (09/11/23 1300) Vital Signs (24h Range):  Temp:  [97.6 °F (36.4 °C)-98.4 °F (36.9 °C)] 97.6 °F (36.4 °C)  Pulse:  [] 80  Resp:  [13-24] 17  SpO2:  [96 %-100 %] 100 %  BP: (112-149)/(63-87) 124/74     Weight: 65.5 kg (144 lb 6.4 oz)  Body mass index is 20.72 kg/m².    Intake/Output Summary (Last 24 hours) at 9/11/2023 1502  Last data filed at 9/11/2023 0600  Gross per 24 hour   Intake --   Output 600 ml   Net -600 ml         Physical Exam  Vitals and nursing note reviewed.   Constitutional:       General: She is not in acute distress.     Appearance: Normal appearance. She is normal weight.   HENT:      Head: Normocephalic and atraumatic.      Mouth/Throat:      Mouth: Mucous membranes are moist.      Pharynx: Oropharynx is clear.   Eyes:      Extraocular Movements: Extraocular movements intact.      Pupils: Pupils are equal, round, and reactive to light.   Cardiovascular:      Rate and Rhythm: Normal rate and regular rhythm.      Pulses: Normal pulses.      Heart sounds: Normal heart sounds.   Pulmonary:      Effort: Pulmonary effort is normal.      Breath sounds: Normal breath sounds.   Abdominal:      General: Abdomen is flat. Bowel sounds are normal.      Palpations: Abdomen is soft.      Tenderness: There is no abdominal tenderness.   Musculoskeletal:         General: Normal range of motion.      Cervical back: Normal range of motion and neck supple.   Skin:     General: Skin is warm.      Capillary Refill: Capillary refill takes 2 to 3 seconds.      Findings: Abscess (To scalp) present.             Comments:  Abscess to scalp with noted purulent drainage.  See photo   Neurological:      General: No focal deficit present.      Mental Status: She is alert and oriented to person, place, and time. Mental status  is at baseline.   Psychiatric:         Mood and Affect: Mood normal.         Behavior: Behavior normal.                 Significant Labs: All pertinent labs within the past 24 hours have been reviewed.  CBC:   Recent Labs   Lab 09/09/23  1822 09/10/23  2019 09/11/23  0352   WBC 10.86 15.06* 11.96   HGB 14.5 15.6 13.0   HCT 42.2 48.1 38.5    452* 322     CMP:   Recent Labs   Lab 09/10/23  2019 09/10/23  2351 09/11/23  0352 09/11/23  0745   * 134* 131* 134*   K 4.2 4.1 3.5 3.3*    110 110 112*   CO2 6* 8* 10* 12*   * 253* 233* 186*   BUN 11 10 8 8   CREATININE 1.3 0.8 0.8 0.8   CALCIUM 9.3 8.3* 8.1* 8.0*   PROT 8.6*  --   --   --    ALBUMIN 3.9  --   --   --    BILITOT 0.2  --   --   --    ALKPHOS 147*  --   --   --    AST 9*  --   --   --    ALT 9*  --   --   --    ANIONGAP 24* 16 11 10     POCT Glucose:   Recent Labs   Lab 09/11/23  1307 09/11/23  1400 09/11/23  1502   POCTGLUCOSE 166* 141* 151*       Significant Imaging: I have reviewed all pertinent imaging results/findings within the past 24 hours.      Assessment/Plan:      * Type 1 diabetes mellitus with ketoacidosis without coma  Patient's FSGs are uncontrolled due to hyperglycemia on current medication regimen.  Last A1c reviewed-   Lab Results   Component Value Date    HGBA1C 13.5 (H) 07/31/2023     Most recent fingerstick glucose reviewed-   Recent Labs   Lab 09/11/23  1159 09/11/23  1307 09/11/23  1400 09/11/23  1502   POCTGLUCOSE 159* 166* 141* 151*     Current correctional scale  insulin infusion as below for DKA  Maintain anti-hyperglycemic dose as follows-   Antihyperglycemics (From admission, onward)    Start     Stop Route Frequency Ordered    09/10/23 2130  insulin regular 1 Units/mL in sodium chloride 0.9% 100 mL infusion        Question Answer Comment   Initial dose (DO NOT CHANGE): 0.1 units/kg/hr    Insulin Rate Adjustment (DO NOT MODIFY ANSWER) [email or web address]\[email or web address]\Images\Pharmacy\InsulinInfusions\INSULIN ADJUSTMENT DKA  version [id number]        -- IV Continuous 09/10/23 2116        Hold Oral hypoglycemics while patient is in the hospital.    Case discussed with patient's Endocrinologist didier recommended increasing patient's home levemir from 15 units BID to 17 units BID.       Abscess  Scalp abscess s/p I & D in ED9/9  -Vancomycin  - wound care consulted  - General surgery consulted to re-evaluate for need for repeat I&D    Hypokalemia  Patient has hypokalemia which is Acute and currently controlled. Most recent potassium levels reviewed-   Lab Results   Component Value Date    K 3.3 (L) 09/11/2023   . Will continue potassium replacement per protocol and recheck repeat levels after replacement completed.           VTE Risk Mitigation (From admission, onward)         Ordered     IP VTE HIGH RISK PATIENT  Once         09/10/23 2116     Place KIANA hose  Until discontinued         09/10/23 2116     Place sequential compression device  Until discontinued         09/10/23 2116                Discharge Planning   TRICE: 9/13/2023     Code Status: Full Code   Is the patient medically ready for discharge?:     Reason for patient still in hospital (select all that apply): Patient trending condition, Laboratory test, Treatment, Consult recommendations and Pending disposition  Discharge Plan A: Home with family                  Katerina Patel PA-C  Department of Hospital Medicine   [facility]/Surg

## 2023-09-11 NOTE — ASSESSMENT & PLAN NOTE
Scalp abscess s/p I & D in ED9/9  -Vancomycin  - wound care consulted  - General surgery consulted to re-evaluate for need for repeat I&D

## 2023-09-11 NOTE — HPI
Adore Estes a 32-year-old female with a past medical history of IDDM type 1 who presents wit and abdominal cramping that started this morning.  Patient was seen in the ED yesterday for laceration to her scalp.  She underwent an I&D and was discharged on p.o. doxycycline.  She reports she was able to take her morning dose of doxycycline, but unable to tolerate p.o. in the evening.  Patient denies fever and worsening drainage from her scalp abscess.  Patient has a history of DKA and states her symptoms today are similar.  ED workup revealed leukocytosis 15.06, and glucose 478.  Potassium 4.2, and sodium 133.  VBG revealed acidosis.  Patient received IV bolus NS and was referred to Hospital Medicine.  Patient seen in the ED with mother at bedside.  She reports feeling weak, nauseated with abdominal cramping.  She denies chest pain, shortness of breath, fever, chills, diarrhea and dysuria.  Patient will be to Hospital Medicine for further evaluation and management.

## 2023-09-11 NOTE — SUBJECTIVE & OBJECTIVE
Past Medical History:   Diagnosis Date    Diabetes mellitus     Diabetes mellitus type I     Diabetes mellitus, type 2     Insulin pump in place     Seizures     last seizure         Past Surgical History:   Procedure Laterality Date    APPENDECTOMY  2017     SECTION         Review of patient's allergies indicates:   Allergen Reactions    Cerebyx [fosphenytoin] Itching    Amoxicillin Hives       No current facility-administered medications on file prior to encounter.     Current Outpatient Medications on File Prior to Encounter   Medication Sig    blood sugar diagnostic Strp Check 5x daily for hypoglycemia.  True Metrix    doxycycline (VIBRAMYCIN) 100 MG Cap Take 1 capsule (100 mg total) by mouth 2 (two) times daily. for 10 days    insulin aspart U-100 (NOVOLOG) 100 unit/mL (3 mL) InPn pen To use with carb ratio 1:4, ISF: 20 MDD: 80 units    insulin detemir U-100, Levemir, (LEVEMIR FLEXPEN) 100 unit/mL (3 mL) InPn pen Inject 15 Units into the skin 2 (two) times daily.     Family History       Problem Relation (Age of Onset)    Asthma Sister    Breast cancer Maternal Aunt          Tobacco Use    Smoking status: Never    Smokeless tobacco: Never   Substance and Sexual Activity    Alcohol use: Yes     Alcohol/week: 3.0 standard drinks of alcohol     Types: 1 Glasses of wine, 1 Cans of beer, 1 Shots of liquor per week     Comment: 2 drink per week     Drug use: No    Sexual activity: Yes     Partners: Male     Birth control/protection: None, Condom     Review of Systems   Constitutional:  Negative for activity change, appetite change, chills and fever.   HENT:  Negative for congestion, sore throat and trouble swallowing.    Eyes:  Negative for photophobia and visual disturbance.   Respiratory:  Negative for cough, chest tightness and shortness of breath.    Cardiovascular:  Negative for chest pain, palpitations and leg swelling.   Gastrointestinal:  Positive for abdominal pain, nausea and vomiting. Negative  for diarrhea.   Genitourinary:  Negative for dysuria, flank pain and hematuria.   Musculoskeletal:  Negative for back pain.   Neurological:  Positive for weakness. Negative for dizziness and headaches.   Psychiatric/Behavioral:  Negative for confusion.      Objective:     Vital Signs (Most Recent):  Temp: 97.8 °F (36.6 °C) (09/10/23 1959)  Pulse: 107 (09/10/23 2040)  Resp: (!) 24 (09/10/23 2031)  BP: (!) 149/84 (09/10/23 2040)  SpO2: 100 % (09/10/23 2040) Vital Signs (24h Range):  Temp:  [97.8 °F (36.6 °C)] 97.8 °F (36.6 °C)  Pulse:  [107-123] 107  Resp:  [24] 24  SpO2:  [99 %-100 %] 100 %  BP: (136-149)/(83-84) 149/84     Weight: 65.8 kg (145 lb)  Body mass index is 20.81 kg/m².     Physical Exam  Vitals reviewed.   Constitutional:       Appearance: Normal appearance. She is normal weight. She is ill-appearing.   HENT:      Head: Normocephalic.        Comments: Scalp abscess (see photo in chart)     Mouth/Throat:      Mouth: Mucous membranes are moist.      Pharynx: Oropharynx is clear.   Eyes:      Pupils: Pupils are equal, round, and reactive to light.   Cardiovascular:      Rate and Rhythm: Regular rhythm. Tachycardia present.      Pulses: Normal pulses.      Heart sounds: Normal heart sounds.   Pulmonary:      Effort: Pulmonary effort is normal.      Breath sounds: Normal breath sounds.   Abdominal:      General: Bowel sounds are normal.      Palpations: Abdomen is soft.   Musculoskeletal:         General: Normal range of motion.      Cervical back: Normal range of motion.   Skin:     General: Skin is warm and dry.   Neurological:      Mental Status: She is alert and oriented to person, place, and time. Mental status is at baseline.   Psychiatric:         Mood and Affect: Mood normal.              CRANIAL NERVES     CN III, IV, VI   Pupils are equal, round, and reactive to light.       Significant Labs: All pertinent labs within the past 24 hours have been reviewed.  ABGs:   Recent Labs   Lab 09/09/23 1929  09/10/23  2031   PH 7.331* 7.044*   PCO2 30.1* 23.7*   HCO3 15.9* 6.5*   POCSATURATED 77* 43*   BE -10 -24   PO2 43 34*     CBC:   Recent Labs   Lab 09/09/23  1822 09/10/23  2019   WBC 10.86 15.06*   HGB 14.5 15.6   HCT 42.2 48.1    452*     CMP:   Recent Labs   Lab 09/09/23  1822 09/10/23  2019    133*   K 4.0 4.2    103   CO2 16* 6*   * 478*   BUN 8 11   CREATININE 0.7 1.3   CALCIUM 9.6 9.3   PROT  --  8.6*   ALBUMIN  --  3.9   BILITOT  --  0.2   ALKPHOS  --  147*   AST  --  9*   ALT  --  9*   ANIONGAP 17* 24*       Significant Imaging: I have reviewed all pertinent imaging results/findings within the past 24 hours.

## 2023-09-11 NOTE — ASSESSMENT & PLAN NOTE
Patient's FSGs are uncontrolled due to hyperglycemia on current medication regimen.  Last A1c reviewed-   Lab Results   Component Value Date    HGBA1C 13.5 (H) 07/31/2023     Most recent fingerstick glucose reviewed-   Recent Labs   Lab 09/11/23  1159 09/11/23  1307 09/11/23  1400 09/11/23  1502   POCTGLUCOSE 159* 166* 141* 151*     Current correctional scale  insulin infusion as below for DKA  Maintain anti-hyperglycemic dose as follows-   Antihyperglycemics (From admission, onward)    Start     Stop Route Frequency Ordered    09/10/23 2130  insulin regular 1 Units/mL in sodium chloride 0.9% 100 mL infusion        Question Answer Comment   Initial dose (DO NOT CHANGE): 0.1 units/kg/hr    Insulin Rate Adjustment (DO NOT MODIFY ANSWER) [email or web address]\Images\Pharmacy\InsulinInfusions\INSULIN ADJUSTMENT DKA version [id number]        -- IV Continuous 09/10/23 2116        Hold Oral hypoglycemics while patient is in the hospital.    Case discussed with patient's Endocrinologist didier recommended increasing patient's home levemir from 15 units BID to 17 units BID.

## 2023-09-11 NOTE — NURSING
Head wound examined by Dr Adamson at bedside. Purulent drainage expressed by MD- 4x4 gauze changed, recovered with 4x4 and cloth band to hold in place. Phlebotomist at bedside to recollect lab specimen.

## 2023-09-11 NOTE — EICU
Intervention Initiated From:  [facility] / EICU    Lenny intervened regarding:  Rounding (Video assessment)    Nurse Notified:  No    Doctor Notified:  No    Comments: EICU rounding done. Pt in bed, appears to be sleeping.  Chart and meds reviewed.  VS stable.

## 2023-09-11 NOTE — NURSING
05:02 am Notified Rupinder Frazier NP that patient went from  at last check to  now. Patient remains on titrating insulin drip. Restarted NS @ 125 ml/hr per NP instructions and paused D5 0.45% at this time.

## 2023-09-11 NOTE — PLAN OF CARE
Problem: Adult Inpatient Plan of Care  Goal: Plan of Care Review  Outcome: Ongoing, Progressing     Problem: Adult Inpatient Plan of Care  Goal: Patient-Specific Goal (Individualized)  Outcome: Ongoing, Progressing     Problem: Diabetes Comorbidity  Goal: Blood Glucose Level Within Targeted Range  Outcome: Ongoing, Progressing     Problem: Diabetic Ketoacidosis  Goal: Fluid and Electrolyte Balance with Absence of Ketosis  Outcome: Ongoing, Progressing

## 2023-09-11 NOTE — ED PROVIDER NOTES
Chief complaint:  Nausea (Patient states that she is in diabetic ketoacidosis.), Vomiting, and Hyperglycemia      HPI:  Adore Estes is a 32 y.o. female with hx IDDM, prior DKA, and I&D with PO doxycycline for scalp abscess in ED presenting with onset of malaise, nausea, nonbilious nonbloody vomiting with generalized crampy abdominal pain since this morning.  She denies diarrhea.  She was able to take morning dose of doxycycline but not in the evening.  She denies change in drained scalp abscess.  She denies fever.  Abdominal pain is intermittent and crampy.  Symptoms do feel similar to previous DKA.  She denies other new medications.    ROS: As per HPI and below:  No lightheadedness, syncope, chest pain, cough, congestion, dysuria urinary frequency, hematuria.    Review of patient's allergies indicates:   Allergen Reactions    Cerebyx [fosphenytoin] Itching    Amoxicillin Hives       Patient's Medications   New Prescriptions    No medications on file   Previous Medications    BLOOD SUGAR DIAGNOSTIC STRP    Check 5x daily for hypoglycemia.  True Metrix    DOXYCYCLINE (VIBRAMYCIN) 100 MG CAP    Take 1 capsule (100 mg total) by mouth 2 (two) times daily. for 10 days    INSULIN ASPART U-100 (NOVOLOG) 100 UNIT/ML (3 ML) INPN PEN    To use with carb ratio 1:4, ISF: 20 MDD: 80 units    INSULIN DETEMIR U-100, LEVEMIR, (LEVEMIR FLEXPEN) 100 UNIT/ML (3 ML) INPN PEN    Inject 15 Units into the skin 2 (two) times daily.   Modified Medications    No medications on file   Discontinued Medications    No medications on file       PMH:  As per HPI and below:  Past Medical History:   Diagnosis Date    Diabetes mellitus     Diabetes mellitus type I     Diabetes mellitus, type 2     Insulin pump in place     Seizures     last seizure       Past Surgical History:   Procedure Laterality Date    APPENDECTOMY  2017     SECTION         Social History     Socioeconomic History    Marital status:    Occupational  History    Occupation: [employer]  & work at [employer]    Tobacco Use    Smoking status: Never    Smokeless tobacco: Never   Substance and Sexual Activity    Alcohol use: Yes     Alcohol/week: 3.0 standard drinks of alcohol     Types: 1 Glasses of wine, 1 Cans of beer, 1 Shots of liquor per week     Comment: 2 drink per week     Drug use: No    Sexual activity: Yes     Partners: Male     Birth control/protection: None, Condom     Social Determinants of Health     Financial Resource Strain: Unknown (11/19/2022)    Overall Financial Resource Strain (CARDIA)     Difficulty of Paying Living Expenses: Patient refused   Food Insecurity: Unknown (11/19/2022)    Hunger Vital Sign     Worried About Running Out of Food in the Last Year: Patient refused     Ran Out of Food in the Last Year: Patient refused   Transportation Needs: Unknown (11/19/2022)    PRAPARE - Transportation     Lack of Transportation (Medical): Patient refused     Lack of Transportation (Non-Medical): Patient refused   Physical Activity: Unknown (11/19/2022)    Exercise Vital Sign     Days of Exercise per Week: Patient refused     Minutes of Exercise per Session: Patient refused   Stress: Unknown (11/19/2022)    [employer] [place] [employer] - Occupational Stress Questionnaire     Feeling of Stress : Patient refused   Social Connections: Unknown (11/19/2022)    Social Connection and Isolation Panel [NHANES]     Frequency of Communication with Friends and Family: Patient refused     Frequency of Social Gatherings with Friends and Family: Patient refused     Attends Hinduism Services: Patient refused     Active Member of Clubs or Organizations: Patient refused     Attends Club or Organization Meetings: Patient refused     Marital Status: Patient refused   Housing Stability: Unknown (11/19/2022)    Housing Stability Vital Sign     Unable to Pay for Housing in the Last Year: Patient refused     Unstable Housing in the Last Year: Patient  refused       Family History   Problem Relation Age of Onset    Breast cancer Maternal Aunt     Asthma Sister     Colon cancer Neg Hx     Ovarian cancer Neg Hx        Physical Exam:    Vitals:    09/10/23 2040   BP: (!) 149/84   Pulse: 107   Resp:    Temp:      GENERAL:  Mild discomfort secondary to nausea.  Alert.    HEENT:  Moist mucous membranes.  Normocephalic and atraumatic.    NECK:  No swelling.  Midline trachea.   CARDIOVASCULAR:  Tachycardic with regular rhythm.  2+ radial pulses.  No murmur.  PULMONARY:  Lungs clear to auscultation bilaterally.  No wheezes, rales, or rhonci.    ABDOMEN:  Mild bilateral upper quadrant and left lower quadrant tenderness without voluntary or involuntary guarding.  No distention, masses, palpable hernias.  EXTREMITIES:  Warm and well perfused.  Brisk capillary refill.  No peripheral edema.  NEUROLOGICAL:  Normal mental status.  Appropriate and conversant.    SKIN:  No rashes or ecchymoses.    BACK:  Atraumatic.  No CVA tenderness to palpation.      Labs Reviewed   CBC W/ AUTO DIFFERENTIAL - Abnormal; Notable for the following components:       Result Value    WBC 15.06 (*)     Platelets 452 (*)     MPV 9.1 (*)     Immature Granulocytes 1.2 (*)     Gran # (ANC) 12.1 (*)     Immature Grans (Abs) 0.18 (*)     Gran % 80.2 (*)     Lymph % 13.4 (*)     All other components within normal limits   COMPREHENSIVE METABOLIC PANEL - Abnormal; Notable for the following components:    Sodium 133 (*)     CO2 6 (*)     Glucose 478 (*)     Total Protein 8.6 (*)     Alkaline Phosphatase 147 (*)     AST 9 (*)     ALT 9 (*)     eGFR 56 (*)     Anion Gap 24 (*)     All other components within normal limits    Narrative:        CO2 ,Glu critical result(s) called and verbal readback obtained   from Jenifer Mei RN ED  by [facility] 09/10/2023 21:02   BETA - HYDROXYBUTYRATE, SERUM - Abnormal; Notable for the following components:    Beta-Hydroxybutyrate 5.3 (*)     All other components within normal  limits   POCT GLUCOSE - Abnormal; Notable for the following components:    POCT Glucose 449 (*)     All other components within normal limits   ISTAT PROCEDURE - Abnormal; Notable for the following components:    POC PH 7.044 (*)     POC PCO2 23.7 (*)     POC PO2 34 (*)     POC HCO3 6.5 (*)     POC SATURATED O2 43 (*)     POC TCO2 7 (*)     All other components within normal limits   POCT URINE PREGNANCY - Normal   LIPASE   PHOSPHORUS    Narrative:     With next lab draw   MAGNESIUM    Narrative:     With next lab draw   BASIC METABOLIC PANEL   PREGNANCY TEST SCREENING, SERUM   POCT GLUCOSE MONITORING CONTINUOUS   POCT GLUCOSE MONITORING CONTINUOUS       Current Discharge Medication List        CONTINUE these medications which have NOT CHANGED    Details   blood sugar diagnostic Strp Check 5x daily for hypoglycemia.  True Metrix  Qty: 500 strip, Refills: 3    Associated Diagnoses: Type 1 diabetes mellitus without complication      doxycycline (VIBRAMYCIN) 100 MG Cap Take 1 capsule (100 mg total) by mouth 2 (two) times daily. for 10 days  Qty: 19 capsule, Refills: 0      insulin aspart U-100 (NOVOLOG) 100 unit/mL (3 mL) InPn pen To use with carb ratio 1:4, ISF: 20 MDD: 80 units  Qty: 30 mL, Refills: 11    Associated Diagnoses: Type 1 diabetes mellitus without complication      insulin detemir U-100, Levemir, (LEVEMIR FLEXPEN) 100 unit/mL (3 mL) InPn pen Inject 15 Units into the skin 2 (two) times daily.  Qty: 30 mL, Refills: 3    Associated Diagnoses: Type 1 diabetes mellitus with hyperglycemia             Orders Placed This Encounter   Procedures    Complete Blood Count (CBC)    Comprehensive Metabolic Panel (CMP)    Lipase    Beta-Hydroxybutyrate, Serum    Phosphorus    Magnesium    Basic metabolic panel    Phosphorus    CBC auto differential    Pregnancy Test Screening, Serum    Diet NPO    Cardiac Monitoring - Adult    Height and weight    Saline lock IV    Strict intake and output    If any glucose result is  less than 50 or greater than 400:    If 2nd glucose result is less than 50 or greater than 400:    Notify Physician    Notify physician for any DKA/HHS patient that is not responding as expected to Step 2 (running infusion at set rate without titration).  Not responding is defined as any increase in glucose from previous glucose OR not achieving  less than 200 after 6 hours on the continuous rate.    Notify physician if Potassium less than 3.0    Place KIANA hose    Place sequential compression device    BG 80 - 100 mg/dL:    Vital signs q4h - select for floor patients    Full code    Inpatient consult to Registered Dietitian/Nutritionist    Pharmacy to dose Vancomycin consult    POCT VENOUS BLOOD GAS    Pulse Oximetry Q Shift    POCT urine pregnancy    Insert Saline lock IV    Possible Hospitalization    Admit to Inpatient       Imaging Results    None              MDM:    32 y.o. female with onset of vomiting and crampy abdominal pain in this patient with diabetes concerning for DKA.  Laboratory sent to assess for DKA including venous blood gas.  Laboratories also sent to assess for electrolytes, renal function.  Possible contribution of recent infection despite recent incision and drainage and antibiotic initiation.  She claims good compliance with insulin therapy.  I have very low suspicion for life-threatening intra-abdominal process requiring further imaging or surgical consultation.  I doubt cholecystitis, abscess, diverticulitis, appendicitis.  Antiemetic and IV fluids initially ordered for symptomatic relief pending laboratories.      Patient has initially adequate potassium with insulin drip ordered for DKA marked by acidosis and anion gap.  There is no mental status change or indication of cerebral edema.  There is ongoing volume resuscitation as well.  I will admit for insulin therapy and treatment of DKA.  Symptoms improved with fluids and antiemetic here.  Nonspecific leukocytosis noted, possibly  related to known focal skin infection.  I have discussed with hospital medicine who will assume care.    Diagnoses:    1. Hyperglycemia  2. Vomiting  3. DKA  4. Scalp abscess    Critical Care    Date/Time: 9/10/2023 7:52 PM    Performed by: Jorge Simpson MD  Authorized by: Cyndie Lloyd MD  Direct patient critical care time: 15 minutes  Additional history critical care time: 5 minutes  Ordering / reviewing critical care time: 6 minutes  Documentation critical care time: 5 minutes  Consulting other physicians critical care time: 2 minutes  Total critical care time (exclusive of procedural time) : 33 minutes  Critical care time was exclusive of separately billable procedures and treating other patients.               Jorge Simpson MD  09/10/23 6019

## 2023-09-11 NOTE — PLAN OF CARE
[facility] - Med/Surg  Initial Discharge Assessment       Primary Care Provider: Octavio Goodwin MD    Admission Diagnosis: Diabetic ketoacidosis without coma associated with type 1 diabetes mellitus [E10.10]    Admission Date: 9/10/2023  Expected Discharge Date:     Transition of Care Barriers: None    Payor: MEDICAID / Plan: AETNA [facility] / Product Type: Managed Medicaid /     Extended Emergency Contact Information  Primary Emergency Contact: Dee Godinez  Address: [address] [place] [address] [place] [address] [facility]  Home Phone: [phone]  Mobile Phone: [phone]  Relation: Mother    Discharge Plan A: Home with family  Discharge Plan B: Home with family      [id number] - [place] - [address] & [facility]  [address]   [place] [address]  Phone: [phone] Fax: [phone]    DC assessment completed with pt at bedside, information on facesheet verified. Lives at listed address with family. Mother or sister will drive her home. PCP is Dr. Goodwin. [facility] [address]. Denies coumadin, HH, HD. DME- glucometer. Insurance verified. Drives self. Denies POA. Denies recent admission. Planning to DC home when clear.     Initial Assessment (most recent)       Adult Discharge Assessment - 09/11/23 1458          Discharge Assessment    Assessment Type Discharge Planning Assessment     Confirmed/corrected address, phone number and insurance Yes     Confirmed Demographics Correct on Facesheet     Source of Information patient     Communicated TRICE with patient/caregiver Yes     People in Home parent(s);sibling(s)     Facility Arrived From: home     Do you expect to return to your current living situation? Yes     Do you have help at home or someone to help you manage your care at home? No     Prior to hospitilization cognitive status: Alert/Oriented     Current cognitive status: Alert/Oriented     Equipment Currently Used  at Home glucometer     Readmission within 30 days? No     Patient currently being followed by outpatient case management? No     Do you currently have service(s) that help you manage your care at home? No     Do you take prescription medications? Yes     Do you have prescription coverage? Yes     Do you have any problems affording any of your prescribed medications? TBD     Is the patient taking medications as prescribed? yes     Who is going to help you get home at discharge? mother or sister     How do you get to doctors appointments? car, drives self     Are you on dialysis? No     Do you take coumadin? No     DME Needed Upon Discharge  none     Discharge Plan discussed with: Patient     Transition of Care Barriers None     Discharge Plan A Home with family     Discharge Plan B Home with family

## 2023-09-11 NOTE — CONSULTS
Food & Nutrition                                                           Education     Diet Education: Diabetic Diet, Diabetes Management  Time Spent: 15 minutes  Learners: Patient        Nutrition Education provided with handouts: no, deferred        Comments: Patient with DM1 admitted with DKA. According to last endo note in July CF 20 for goal of 120 mg/dl, and insulin:carb ratio is 1:4, she is to take levemir BID 15 units. Pt is NPO today, feeling less nauseous, only had N/V x 1 day PTA s/p eating dinnner. Diabetes uncontrolled, A1C 13.5.    I spoke with the pt and asked her to recall her correction factor, insulin:carb ratio, to tell me when she takes her insulin and when she checks her blood sugars. She claims to eat 3 meals/day and to take all of her insulin shots as ordered. Math for correction factor and carb counting checked, demonstration used. Pt is counting and doing correction math correctly. Despite claiming that she is following her care plan, pt says she has an abscess in her neck and is under an extreme amount of stress at home and often will wait until last to eat meals ( feeds son/room ). Again denies that she skips meals/doses. Her sugars are high 100's to 200's when she wakes up and variable at dinner time as her meal time is always different. She will correct her blood sugars when she feels that she is getting to high at other times during the day ( only uses insulin pens- does not use CGM/insulin pump). I encouraged the pt to remember to eat and take her inulin as well as reach out to her endocrinologist.    I messaged Dr. Parsons and she recommends increasing levemir to 17 units BID for home.     All questions and concerns answered. Dietitian's contact information provided.         Follow-Up: yes     Please Re-consult as needed           Thanks!

## 2023-09-11 NOTE — CONSULTS
18 Gx 10cm PowerGlide Midline placed to pts brachial vein with the use of ultrasound guidance.    Ultrasound guidance: yes  Vessel Caliber: large and patent, compressibility normal  Needle advanced into vessel with real time Ultrasound guidance.  Guidewire confirmed in vessel.  Image recorded and saved.  Sterile sheath used.  Sterile dressing applied  Arm circumference- 26cm  Dressing dated   Education provided to patient re: proper maintenance of line- pt verbalized understanding  Limb alert applied.

## 2023-09-11 NOTE — SUBJECTIVE & OBJECTIVE
Interval History: Patient seen examined.  Overnight notes reviewed.   Patient remains on DKA pathway.  Trending BMP.  Anion gap closed however patient remains acidotic.   Patient has abscess to scalp with purulent drainage.  Wound care in general surgery consulted.  Patient remains on IV vanc.    Review of Systems   Constitutional:  Positive for fatigue.   Respiratory:  Negative for shortness of breath.    Cardiovascular:  Negative for chest pain.   Gastrointestinal:  Negative for abdominal pain and nausea.   Skin:  Positive for wound.     Objective:     Vital Signs (Most Recent):  Temp: 97.6 °F (36.4 °C) (09/11/23 0800)  Pulse: 80 (09/11/23 1300)  Resp: 17 (09/11/23 1300)  BP: 124/74 (09/11/23 1300)  SpO2: 100 % (09/11/23 1300) Vital Signs (24h Range):  Temp:  [97.6 °F (36.4 °C)-98.4 °F (36.9 °C)] 97.6 °F (36.4 °C)  Pulse:  [] 80  Resp:  [13-24] 17  SpO2:  [96 %-100 %] 100 %  BP: (112-149)/(63-87) 124/74     Weight: 65.5 kg (144 lb 6.4 oz)  Body mass index is 20.72 kg/m².    Intake/Output Summary (Last 24 hours) at 9/11/2023 1502  Last data filed at 9/11/2023 0600  Gross per 24 hour   Intake --   Output 600 ml   Net -600 ml         Physical Exam  Vitals and nursing note reviewed.   Constitutional:       General: She is not in acute distress.     Appearance: Normal appearance. She is normal weight.   HENT:      Head: Normocephalic and atraumatic.      Mouth/Throat:      Mouth: Mucous membranes are moist.      Pharynx: Oropharynx is clear.   Eyes:      Extraocular Movements: Extraocular movements intact.      Pupils: Pupils are equal, round, and reactive to light.   Cardiovascular:      Rate and Rhythm: Normal rate and regular rhythm.      Pulses: Normal pulses.      Heart sounds: Normal heart sounds.   Pulmonary:      Effort: Pulmonary effort is normal.      Breath sounds: Normal breath sounds.   Abdominal:      General: Abdomen is flat. Bowel sounds are normal.      Palpations: Abdomen is soft.       Tenderness: There is no abdominal tenderness.   Musculoskeletal:         General: Normal range of motion.      Cervical back: Normal range of motion and neck supple.   Skin:     General: Skin is warm.      Capillary Refill: Capillary refill takes 2 to 3 seconds.      Findings: Abscess (To scalp) present.             Comments:  Abscess to scalp with noted purulent drainage.  See photo   Neurological:      General: No focal deficit present.      Mental Status: She is alert and oriented to person, place, and time. Mental status is at baseline.   Psychiatric:         Mood and Affect: Mood normal.         Behavior: Behavior normal.                 Significant Labs: All pertinent labs within the past 24 hours have been reviewed.  CBC:   Recent Labs   Lab 09/09/23  1822 09/10/23  2019 09/11/23  0352   WBC 10.86 15.06* 11.96   HGB 14.5 15.6 13.0   HCT 42.2 48.1 38.5    452* 322     CMP:   Recent Labs   Lab 09/10/23  2019 09/10/23  2351 09/11/23  0352 09/11/23  0745   * 134* 131* 134*   K 4.2 4.1 3.5 3.3*    110 110 112*   CO2 6* 8* 10* 12*   * 253* 233* 186*   BUN 11 10 8 8   CREATININE 1.3 0.8 0.8 0.8   CALCIUM 9.3 8.3* 8.1* 8.0*   PROT 8.6*  --   --   --    ALBUMIN 3.9  --   --   --    BILITOT 0.2  --   --   --    ALKPHOS 147*  --   --   --    AST 9*  --   --   --    ALT 9*  --   --   --    ANIONGAP 24* 16 11 10     POCT Glucose:   Recent Labs   Lab 09/11/23  1307 09/11/23  1400 09/11/23  1502   POCTGLUCOSE 166* 141* 151*       Significant Imaging: I have reviewed all pertinent imaging results/findings within the past 24 hours.

## 2023-09-11 NOTE — PLAN OF CARE
Problem: Diabetic Ketoacidosis  Goal: Fluid and Electrolyte Balance with Absence of Ketosis  Outcome: Ongoing, Progressing     Problem: Adult Inpatient Plan of Care  Goal: Plan of Care Review  Outcome: Ongoing, Progressing  Goal: Patient-Specific Goal (Individualized)  Outcome: Ongoing, Progressing  Goal: Absence of Hospital-Acquired Illness or Injury  Outcome: Ongoing, Progressing  Goal: Optimal Comfort and Wellbeing  Outcome: Ongoing, Progressing  Goal: Readiness for Transition of Care  Outcome: Ongoing, Progressing     Problem: Diabetes Comorbidity  Goal: Blood Glucose Level Within Targeted Range  Outcome: Ongoing, Progressing     Problem: Impaired Wound Healing  Goal: Optimal Wound Healing  Outcome: Ongoing, Progressing     Problem: Skin Injury Risk Increased  Goal: Skin Health and Integrity  Outcome: Ongoing, Progressing     Problem: Infection  Goal: Absence of Infection Signs and Symptoms  Outcome: Ongoing, Progressing

## 2023-09-11 NOTE — PROGRESS NOTES
"Pharmacokinetic Initial Assessment: IV Vancomycin    Assessment/Plan:    Initiate intravenous vancomycin with a maintenance dose of vancomycin 1000mg IV every 12 hours  Desired empiric serum trough concentration is 10 to 15 mcg/mL  Draw vancomycin trough level 60 min prior to fourth dose on 9/12 at approximately 1000  Pharmacy will continue to follow and monitor vancomycin.      Please contact pharmacy at extension [phone] with any questions regarding this assessment.     Thank you for the consult,   Nathan Olivares       Patient brief summary:  Adore Estes is a 32 y.o. female initiated on antimicrobial therapy with IV Vancomycin for treatment of suspected skin & soft tissue infection    Drug Allergies:   Review of patient's allergies indicates:   Allergen Reactions    Cerebyx [fosphenytoin] Itching    Amoxicillin Hives       Actual Body Weight:   65.8 kg    Renal Function:   Estimated Creatinine Clearance: 63.9 mL/min (based on SCr of 1.3 mg/dL).,     Dialysis Method (if applicable):  N/A    CBC (last 72 hours):  Recent Labs   Lab Result Units 09/09/23  1822 09/10/23  2019   WBC K/uL 10.86 15.06*   Hemoglobin g/dL 14.5 15.6   Hematocrit % 42.2 48.1   Platelets K/uL 361 452*   Gran % % 71.4 80.2*   Lymph % % 19.4 13.4*   Mono % % 7.5 4.4   Eosinophil % % 0.8 0.4   Basophil % % 0.3 0.4   Differential Method  Automated Automated       Metabolic Panel (last 72 hours):  Recent Labs   Lab Result Units 09/09/23  1822 09/10/23  2019   Sodium mmol/L 136 133*   Potassium mmol/L 4.0 4.2   Chloride mmol/L 103 103   CO2 mmol/L 16* 6*   Glucose mg/dL 297* 478*   BUN mg/dL 8 11   Creatinine mg/dL 0.7 1.3   Albumin g/dL  --  3.9   Total Bilirubin mg/dL  --  0.2   Alkaline Phosphatase U/L  --  147*   AST U/L  --  9*   ALT U/L  --  9*   Magnesium mg/dL  --  1.9   Phosphorus mg/dL  --  4.1       Drug levels (last 3 results):  No results for input(s): "VANCOMYCINRA", "VANCORANDOM", "VANCOMYCINPE", "VANCOPEAK", "VANCOMYCINTR", "VANCOTROUGH" " in the last 72 hours.    Microbiologic Results:  Microbiology Results (last 7 days)       ** No results found for the last 168 hours. **

## 2023-09-11 NOTE — CONSULTS
Consulted for wound to her posterior head. Wound is very painful to minimal touch. Patient could not tolerated cleaning of the wound. Due to induration, redness and pain was not able to better assess wound. Patient has a consult with general surgery to assess. Wound care will follow up post any surgical intervention.         Posterior head wound measures 3cm x 3.5cm and unable to obtain depth.

## 2023-09-11 NOTE — ASSESSMENT & PLAN NOTE
Patient's FSGs are uncontrolled due to hyperglycemia on current medication regimen.  Last A1c reviewed-   Lab Results   Component Value Date    HGBA1C 13.5 (H) 07/31/2023     Most recent fingerstick glucose reviewed-   Recent Labs   Lab 09/10/23  2023   POCTGLUCOSE 449*     Current correctional scale  insulin infusion as below for DKA  Maintain anti-hyperglycemic dose as follows-   Antihyperglycemics (From admission, onward)    Start     Stop Route Frequency Ordered    09/10/23 2215  insulin regular bolus from bag/infusion 6.58 Units 6.58 mL         -- IV Once 09/10/23 2116    09/10/23 2130  insulin regular 1 Units/mL in sodium chloride 0.9% 100 mL infusion        Question Answer Comment   Initial dose (DO NOT CHANGE): 0.1 units/kg/hr    Insulin Rate Adjustment (DO NOT MODIFY ANSWER) [email or web address] DKA version [email or web address]        -- IV Continuous 09/10/23 2116        Hold Oral hypoglycemics while patient is in the hospital.

## 2023-09-11 NOTE — PROGRESS NOTES
9/11/2023 Vancomycin Follow Up   Adore Estes is a 32 y.o. female being treated for skin & soft tissue infection. Goal 10 to 15 mcg/mL.     Vancomycin level has been adjusted to 9/11/23 @ 2200.    Hope Fregoso, PharmD

## 2023-09-12 PROBLEM — E10.10 DIABETIC KETOACIDOSIS WITHOUT COMA ASSOCIATED WITH TYPE 1 DIABETES MELLITUS: Status: ACTIVE | Noted: 2023-09-12

## 2023-09-12 PROBLEM — L02.811 SCALP ABSCESS: Status: ACTIVE | Noted: 2023-09-12

## 2023-09-12 LAB
ANION GAP SERPL CALC-SCNC: 13 MMOL/L (ref 8–16)
ANION GAP SERPL CALC-SCNC: 7 MMOL/L (ref 8–16)
ANION GAP SERPL CALC-SCNC: 8 MMOL/L (ref 8–16)
ANION GAP SERPL CALC-SCNC: 9 MMOL/L (ref 8–16)
ANION GAP SERPL CALC-SCNC: 9 MMOL/L (ref 8–16)
BASOPHILS # BLD AUTO: 0.02 K/UL (ref 0–0.2)
BASOPHILS NFR BLD: 0.3 % (ref 0–1.9)
BUN SERPL-MCNC: 2 MG/DL (ref 6–20)
BUN SERPL-MCNC: 2 MG/DL (ref 6–20)
BUN SERPL-MCNC: 3 MG/DL (ref 6–20)
BUN SERPL-MCNC: 3 MG/DL (ref 6–20)
BUN SERPL-MCNC: 4 MG/DL (ref 6–20)
CALCIUM SERPL-MCNC: 7.9 MG/DL (ref 8.7–10.5)
CALCIUM SERPL-MCNC: 8.1 MG/DL (ref 8.7–10.5)
CALCIUM SERPL-MCNC: 8.2 MG/DL (ref 8.7–10.5)
CHLORIDE SERPL-SCNC: 105 MMOL/L (ref 95–110)
CHLORIDE SERPL-SCNC: 107 MMOL/L (ref 95–110)
CHLORIDE SERPL-SCNC: 109 MMOL/L (ref 95–110)
CHLORIDE SERPL-SCNC: 110 MMOL/L (ref 95–110)
CHLORIDE SERPL-SCNC: 111 MMOL/L (ref 95–110)
CO2 SERPL-SCNC: 16 MMOL/L (ref 23–29)
CO2 SERPL-SCNC: 17 MMOL/L (ref 23–29)
CO2 SERPL-SCNC: 18 MMOL/L (ref 23–29)
CO2 SERPL-SCNC: 20 MMOL/L (ref 23–29)
CO2 SERPL-SCNC: 20 MMOL/L (ref 23–29)
CREAT SERPL-MCNC: 0.6 MG/DL (ref 0.5–1.4)
CREAT SERPL-MCNC: 0.7 MG/DL (ref 0.5–1.4)
DIFFERENTIAL METHOD: ABNORMAL
EOSINOPHIL # BLD AUTO: 0 K/UL (ref 0–0.5)
EOSINOPHIL NFR BLD: 0.1 % (ref 0–8)
ERYTHROCYTE [DISTWIDTH] IN BLOOD BY AUTOMATED COUNT: 11.7 % (ref 11.5–14.5)
EST. GFR  (NO RACE VARIABLE): >60 ML/MIN/1.73 M^2
GLUCOSE SERPL-MCNC: 155 MG/DL (ref 70–110)
GLUCOSE SERPL-MCNC: 188 MG/DL (ref 70–110)
GLUCOSE SERPL-MCNC: 194 MG/DL (ref 70–110)
GLUCOSE SERPL-MCNC: 220 MG/DL (ref 70–110)
GLUCOSE SERPL-MCNC: 317 MG/DL (ref 70–110)
GLUCOSE SERPL-MCNC: 367 MG/DL (ref 70–110)
HCT VFR BLD AUTO: 35 % (ref 37–48.5)
HGB BLD-MCNC: 11.6 G/DL (ref 12–16)
IMM GRANULOCYTES # BLD AUTO: 0.03 K/UL (ref 0–0.04)
IMM GRANULOCYTES NFR BLD AUTO: 0.4 % (ref 0–0.5)
LYMPHOCYTES # BLD AUTO: 1 K/UL (ref 1–4.8)
LYMPHOCYTES NFR BLD: 13.2 % (ref 18–48)
MCH RBC QN AUTO: 28.4 PG (ref 27–31)
MCHC RBC AUTO-ENTMCNC: 33.1 G/DL (ref 32–36)
MCV RBC AUTO: 86 FL (ref 82–98)
MONOCYTES # BLD AUTO: 0.4 K/UL (ref 0.3–1)
MONOCYTES NFR BLD: 5.4 % (ref 4–15)
NEUTROPHILS # BLD AUTO: 6.3 K/UL (ref 1.8–7.7)
NEUTROPHILS NFR BLD: 80.6 % (ref 38–73)
NRBC BLD-RTO: 0 /100 WBC
PHOSPHATE SERPL-MCNC: 1.3 MG/DL (ref 2.7–4.5)
PLATELET # BLD AUTO: 315 K/UL (ref 150–450)
PMV BLD AUTO: 9.3 FL (ref 9.2–12.9)
POCT GLUCOSE: 147 MG/DL (ref 70–110)
POCT GLUCOSE: 172 MG/DL (ref 70–110)
POCT GLUCOSE: 175 MG/DL (ref 70–110)
POCT GLUCOSE: 182 MG/DL (ref 70–110)
POCT GLUCOSE: 183 MG/DL (ref 70–110)
POCT GLUCOSE: 184 MG/DL (ref 70–110)
POCT GLUCOSE: 186 MG/DL (ref 70–110)
POCT GLUCOSE: 188 MG/DL (ref 70–110)
POCT GLUCOSE: 194 MG/DL (ref 70–110)
POCT GLUCOSE: 194 MG/DL (ref 70–110)
POCT GLUCOSE: 195 MG/DL (ref 70–110)
POCT GLUCOSE: 258 MG/DL (ref 70–110)
POCT GLUCOSE: 306 MG/DL (ref 70–110)
POCT GLUCOSE: 332 MG/DL (ref 70–110)
POCT GLUCOSE: 342 MG/DL (ref 70–110)
POCT GLUCOSE: >500 MG/DL (ref 70–110)
POTASSIUM SERPL-SCNC: 3.2 MMOL/L (ref 3.5–5.1)
POTASSIUM SERPL-SCNC: 3.2 MMOL/L (ref 3.5–5.1)
POTASSIUM SERPL-SCNC: 3.5 MMOL/L (ref 3.5–5.1)
POTASSIUM SERPL-SCNC: 3.6 MMOL/L (ref 3.5–5.1)
POTASSIUM SERPL-SCNC: 3.8 MMOL/L (ref 3.5–5.1)
RBC # BLD AUTO: 4.09 M/UL (ref 4–5.4)
SODIUM SERPL-SCNC: 134 MMOL/L (ref 136–145)
SODIUM SERPL-SCNC: 135 MMOL/L (ref 136–145)
SODIUM SERPL-SCNC: 136 MMOL/L (ref 136–145)
SODIUM SERPL-SCNC: 137 MMOL/L (ref 136–145)
SODIUM SERPL-SCNC: 137 MMOL/L (ref 136–145)
VANCOMYCIN SERPL-MCNC: 4.1 UG/ML
VANCOMYCIN TROUGH SERPL-MCNC: 21 UG/ML (ref 10–22)
WBC # BLD AUTO: 7.8 K/UL (ref 3.9–12.7)

## 2023-09-12 PROCEDURE — 63600175 PHARM REV CODE 636 W HCPCS: Performed by: SURGERY

## 2023-09-12 PROCEDURE — 80202 ASSAY OF VANCOMYCIN: CPT | Performed by: INTERNAL MEDICINE

## 2023-09-12 PROCEDURE — 25000003 PHARM REV CODE 250

## 2023-09-12 PROCEDURE — 36415 COLL VENOUS BLD VENIPUNCTURE: CPT | Performed by: INTERNAL MEDICINE

## 2023-09-12 PROCEDURE — 63600175 PHARM REV CODE 636 W HCPCS

## 2023-09-12 PROCEDURE — 25000003 PHARM REV CODE 250: Performed by: SURGERY

## 2023-09-12 PROCEDURE — 94761 N-INVAS EAR/PLS OXIMETRY MLT: CPT

## 2023-09-12 PROCEDURE — 82947 ASSAY GLUCOSE BLOOD QUANT: CPT | Performed by: INTERNAL MEDICINE

## 2023-09-12 PROCEDURE — 84100 ASSAY OF PHOSPHORUS: CPT | Performed by: SURGERY

## 2023-09-12 PROCEDURE — 80048 BASIC METABOLIC PNL TOTAL CA: CPT | Performed by: SURGERY

## 2023-09-12 PROCEDURE — 63600175 PHARM REV CODE 636 W HCPCS: Performed by: INTERNAL MEDICINE

## 2023-09-12 PROCEDURE — 85025 COMPLETE CBC W/AUTO DIFF WBC: CPT | Performed by: SURGERY

## 2023-09-12 PROCEDURE — 99900035 HC TECH TIME PER 15 MIN (STAT)

## 2023-09-12 PROCEDURE — 36415 COLL VENOUS BLD VENIPUNCTURE: CPT | Performed by: SURGERY

## 2023-09-12 PROCEDURE — 25000003 PHARM REV CODE 250: Performed by: INTERNAL MEDICINE

## 2023-09-12 PROCEDURE — 94799 UNLISTED PULMONARY SVC/PX: CPT

## 2023-09-12 PROCEDURE — 99221 PR INITIAL HOSPITAL CARE,LEVL I: ICD-10-PCS | Mod: ,,, | Performed by: FAMILY MEDICINE

## 2023-09-12 PROCEDURE — 99221 1ST HOSP IP/OBS SF/LOW 40: CPT | Mod: ,,, | Performed by: FAMILY MEDICINE

## 2023-09-12 PROCEDURE — 20600001 HC STEP DOWN PRIVATE ROOM

## 2023-09-12 RX ORDER — IBUPROFEN 200 MG
16 TABLET ORAL
Status: DISCONTINUED | OUTPATIENT
Start: 2023-09-12 | End: 2023-09-14 | Stop reason: HOSPADM

## 2023-09-12 RX ORDER — GLUCAGON 1 MG
1 KIT INJECTION
Status: DISCONTINUED | OUTPATIENT
Start: 2023-09-12 | End: 2023-09-14 | Stop reason: HOSPADM

## 2023-09-12 RX ORDER — IBUPROFEN 200 MG
24 TABLET ORAL
Status: DISCONTINUED | OUTPATIENT
Start: 2023-09-12 | End: 2023-09-14 | Stop reason: HOSPADM

## 2023-09-12 RX ORDER — OXYCODONE AND ACETAMINOPHEN 10; 325 MG/1; MG/1
1 TABLET ORAL EVERY 4 HOURS PRN
Status: DISCONTINUED | OUTPATIENT
Start: 2023-09-12 | End: 2023-09-12

## 2023-09-12 RX ORDER — INSULIN ASPART 100 [IU]/ML
0-10 INJECTION, SOLUTION INTRAVENOUS; SUBCUTANEOUS
Status: DISCONTINUED | OUTPATIENT
Start: 2023-09-12 | End: 2023-09-14 | Stop reason: HOSPADM

## 2023-09-12 RX ORDER — HYDROCODONE BITARTRATE AND ACETAMINOPHEN 10; 325 MG/1; MG/1
1 TABLET ORAL EVERY 6 HOURS PRN
Status: DISCONTINUED | OUTPATIENT
Start: 2023-09-12 | End: 2023-09-14 | Stop reason: HOSPADM

## 2023-09-12 RX ORDER — OXYCODONE AND ACETAMINOPHEN 5; 325 MG/1; MG/1
1 TABLET ORAL EVERY 4 HOURS PRN
Status: DISCONTINUED | OUTPATIENT
Start: 2023-09-12 | End: 2023-09-12

## 2023-09-12 RX ADMIN — FAMOTIDINE 20 MG: 10 INJECTION, SOLUTION INTRAVENOUS at 08:09

## 2023-09-12 RX ADMIN — SODIUM CHLORIDE 0.05 UNITS/KG/HR: 9 INJECTION, SOLUTION INTRAVENOUS at 03:09

## 2023-09-12 RX ADMIN — POTASSIUM CHLORIDE 10 MEQ: 7.46 INJECTION, SOLUTION INTRAVENOUS at 09:09

## 2023-09-12 RX ADMIN — INSULIN ASPART 3 UNITS: 100 INJECTION, SOLUTION INTRAVENOUS; SUBCUTANEOUS at 09:09

## 2023-09-12 RX ADMIN — SODIUM CHLORIDE 0.05 UNITS/KG/HR: 9 INJECTION, SOLUTION INTRAVENOUS at 05:09

## 2023-09-12 RX ADMIN — DEXTROSE, SODIUM CHLORIDE, AND POTASSIUM CHLORIDE: 5; .45; .15 INJECTION INTRAVENOUS at 03:09

## 2023-09-12 RX ADMIN — POTASSIUM CHLORIDE 10 MEQ: 7.46 INJECTION, SOLUTION INTRAVENOUS at 07:09

## 2023-09-12 RX ADMIN — SODIUM CHLORIDE 0.05 UNITS/KG/HR: 9 INJECTION, SOLUTION INTRAVENOUS at 04:09

## 2023-09-12 RX ADMIN — POTASSIUM CHLORIDE 10 MEQ: 7.46 INJECTION, SOLUTION INTRAVENOUS at 06:09

## 2023-09-12 RX ADMIN — MUPIROCIN 1 G: 20 OINTMENT TOPICAL at 08:09

## 2023-09-12 RX ADMIN — VANCOMYCIN HYDROCHLORIDE 1250 MG: 1.25 INJECTION, POWDER, LYOPHILIZED, FOR SOLUTION INTRAVENOUS at 12:09

## 2023-09-12 RX ADMIN — SODIUM PHOSPHATE, MONOBASIC, MONOHYDRATE AND SODIUM PHOSPHATE, DIBASIC, ANHYDROUS 30 MMOL: 142; 276 INJECTION, SOLUTION INTRAVENOUS at 08:09

## 2023-09-12 RX ADMIN — SODIUM CHLORIDE 0.05 UNITS/KG/HR: 9 INJECTION, SOLUTION INTRAVENOUS at 06:09

## 2023-09-12 RX ADMIN — INSULIN ASPART 8 UNITS: 100 INJECTION, SOLUTION INTRAVENOUS; SUBCUTANEOUS at 05:09

## 2023-09-12 RX ADMIN — INSULIN DETEMIR 17 UNITS: 100 INJECTION, SOLUTION SUBCUTANEOUS at 08:09

## 2023-09-12 RX ADMIN — SODIUM CHLORIDE 0.02 UNITS/KG/HR: 9 INJECTION, SOLUTION INTRAVENOUS at 12:09

## 2023-09-12 RX ADMIN — SODIUM CHLORIDE 125 ML/HR: 9 INJECTION, SOLUTION INTRAVENOUS at 10:09

## 2023-09-12 RX ADMIN — POTASSIUM CHLORIDE 10 MEQ: 7.46 INJECTION, SOLUTION INTRAVENOUS at 08:09

## 2023-09-12 RX ADMIN — INSULIN DETEMIR 17 UNITS: 100 INJECTION, SOLUTION SUBCUTANEOUS at 09:09

## 2023-09-12 RX ADMIN — VANCOMYCIN HYDROCHLORIDE 1250 MG: 1.25 INJECTION, POWDER, LYOPHILIZED, FOR SOLUTION INTRAVENOUS at 08:09

## 2023-09-12 RX ADMIN — SODIUM CHLORIDE 0.05 UNITS/KG/HR: 9 INJECTION, SOLUTION INTRAVENOUS at 02:09

## 2023-09-12 NOTE — PROGRESS NOTES
Pharmacokinetic Assessment Follow Up: IV Vancomycin    Vancomycin serum concentration assessment(s):    The random level was drawn correctly and can be used to guide therapy at this time. The measurement is below the desired definitive target range of 10 to 15 mcg/mL.    Vancomycin Regimen Plan:    Change regimen to Vancomycin 1250 mg IV every 8 hours with next serum trough concentration measured at 0130 prior to third dose on 9/13/23    Drug levels (last 3 results):  Recent Labs   Lab Result Units 09/11/23  2344 09/12/23  0755   Vancomycin, Random ug/mL  --  4.1   Vancomycin-Trough ug/mL 21.0  --        Pharmacy will continue to follow and monitor vancomycin.    Please contact pharmacy at extension [phone] for questions regarding this assessment.    Thank you for the consult,   Hope Fregoso       Patient brief summary:  Adore Estes is a 32 y.o. female initiated on antimicrobial therapy with IV Vancomycin for treatment of skin & soft tissue infection      Drug Allergies:   Review of patient's allergies indicates:   Allergen Reactions    Cerebyx [fosphenytoin] Itching    Amoxicillin Hives       Actual Body Weight:   68.6 kg    Renal Function:   Estimated Creatinine Clearance: 144.2 mL/min (based on SCr of 0.6 mg/dL).,     Dialysis Method (if applicable):  N/A    CBC (last 72 hours):  Recent Labs   Lab Result Units 09/09/23  1822 09/10/23  2019 09/11/23  0352 09/12/23  0459   WBC K/uL 10.86 15.06* 11.96 7.80   Hemoglobin g/dL 14.5 15.6 13.0 11.6*   Hematocrit % 42.2 48.1 38.5 35.0*   Platelets K/uL 361 452* 322 315   Gran % % 71.4 80.2* 75.4* 80.6*   Lymph % % 19.4 13.4* 16.8* 13.2*   Mono % % 7.5 4.4 6.3 5.4   Eosinophil % % 0.8 0.4 0.3 0.1   Basophil % % 0.3 0.4 0.4 0.3   Differential Method  Automated Automated Automated Automated       Metabolic Panel (last 72 hours):  Recent Labs   Lab Result Units 09/09/23  1822 09/10/23  2019 09/10/23  2351 09/11/23  0352 09/11/23  0745 09/11/23  1640 09/11/23  4791  09/12/23  0459 09/12/23  0756 09/12/23  0859   Sodium mmol/L 136 133* 134* 131* 134* 134* 137 137 136  --    Potassium mmol/L 4.0 4.2 4.1 3.5 3.3* 3.2* 3.2* 3.5 3.8  --    Chloride mmol/L 103 103 110 110 112* 107 111* 110 109  --    CO2 mmol/L 16* 6* 8* 10* 12* 14* 17* 18* 20*  --    Glucose mg/dL 297* 478* 253* 233* 186* 156* 155* 194* 188* 317*   BUN mg/dL 8 11 10 8 8 5* 3* 2* 2*  --    Creatinine mg/dL 0.7 1.3 0.8 0.8 0.8 0.8 0.6 0.6 0.6  --    Albumin g/dL  --  3.9  --   --   --   --   --   --   --   --    Total Bilirubin mg/dL  --  0.2  --   --   --   --   --   --   --   --    Alkaline Phosphatase U/L  --  147*  --   --   --   --   --   --   --   --    AST U/L  --  9*  --   --   --   --   --   --   --   --    ALT U/L  --  9*  --   --   --   --   --   --   --   --    Magnesium mg/dL  --  1.9  --   --   --   --   --   --   --   --    Phosphorus mg/dL  --  4.1  --  2.2*  --   --   --  1.3*  --   --        Vancomycin Administrations:  vancomycin given in the last 96 hours                     vancomycin (VANCOCIN) 1,000 mg in dextrose 5 % (D5W) 250 mL IVPB (Vial-Mate) (mg) 1,000 mg New Bag 09/11/23 2230     1,000 mg New Bag  1121     1,000 mg New Bag 09/10/23 2320                    Microbiologic Results:  Microbiology Results (last 7 days)       Procedure Component Value Units Date/Time    Culture, Anaerobe [[id number]] Collected: 09/11/23 2103    Order Status: Sent Specimen: Wound from Head Updated: 09/12/23 0936    Gram stain [[id number]] Collected: 09/11/23 2103    Order Status: Sent Specimen: Wound from Head Updated: 09/12/23 0935    Aerobic culture [[id number]] Collected: 09/11/23 2103    Order Status: Sent Specimen: Wound from Head Updated: 09/12/23 0859

## 2023-09-12 NOTE — SUBJECTIVE & OBJECTIVE
Interval History: Patient seen examined.  Overnight notes reviewed.  Patient states she is feeling better today. CO2 improved on am labs. Transitioning off insulin gtt to SSI and Levemir 17 units (pt's endocrinologist recommended increasing home levemir from 15 units BID to 17 units BID). Diabetic diet ordered. Patient is POD 1 I&D in OR with Dr. Adamson. Wound care and general surgery following.  Patient remains on IV vanc. Intra-operative wound cultures in process.     Review of Systems   Constitutional:  Positive for fatigue.   Respiratory:  Negative for shortness of breath.    Cardiovascular:  Negative for chest pain.   Gastrointestinal:  Negative for abdominal pain and nausea.   Skin:  Positive for wound.     Objective:     Vital Signs (Most Recent):  Temp: 98.8 °F (37.1 °C) (09/12/23 0900)  Pulse: 84 (09/12/23 1300)  Resp: 20 (09/12/23 1300)  BP: (!) 105/58 (09/12/23 1300)  SpO2: 100 % (09/12/23 1300) Vital Signs (24h Range):  Temp:  [97 °F (36.1 °C)-98.8 °F (37.1 °C)] 98.8 °F (37.1 °C)  Pulse:  [66-96] 84  Resp:  [0-24] 20  SpO2:  [96 %-100 %] 100 %  BP: ()/(56-90) 105/58     Weight: 68.6 kg (151 lb 3.8 oz)  Body mass index is 21.7 kg/m².    Intake/Output Summary (Last 24 hours) at 9/12/2023 1440  Last data filed at 9/12/2023 0610  Gross per 24 hour   Intake 740 ml   Output 3410 ml   Net -2670 ml           Physical Exam  Vitals and nursing note reviewed.   Constitutional:       General: She is not in acute distress.     Appearance: Normal appearance. She is normal weight.   HENT:      Head: Normocephalic and atraumatic.      Mouth/Throat:      Mouth: Mucous membranes are moist.      Pharynx: Oropharynx is clear.   Eyes:      Extraocular Movements: Extraocular movements intact.      Pupils: Pupils are equal, round, and reactive to light.   Cardiovascular:      Rate and Rhythm: Normal rate and regular rhythm.      Pulses: Normal pulses.      Heart sounds: Normal heart sounds.   Pulmonary:      Effort:  Pulmonary effort is normal.      Breath sounds: Normal breath sounds.   Abdominal:      General: Abdomen is flat. Bowel sounds are normal.      Palpations: Abdomen is soft.      Tenderness: There is no abdominal tenderness.   Musculoskeletal:         General: Normal range of motion.      Cervical back: Normal range of motion and neck supple.   Skin:     General: Skin is warm.      Capillary Refill: Capillary refill takes 2 to 3 seconds.      Findings: Abscess (To scalp) present.             Comments: Pod 1 I&D with Dr. Adamson.  Dressing in place   Wound Care following   Neurological:      General: No focal deficit present.      Mental Status: She is alert and oriented to person, place, and time. Mental status is at baseline.   Psychiatric:         Mood and Affect: Mood normal.         Behavior: Behavior normal.               Significant Labs: All pertinent labs within the past 24 hours have been reviewed.  CBC:   Recent Labs   Lab 09/10/23  2019 09/11/23  0352 09/12/23  0459   WBC 15.06* 11.96 7.80   HGB 15.6 13.0 11.6*   HCT 48.1 38.5 35.0*   * 322 315       CMP:   Recent Labs   Lab 09/10/23  2019 09/10/23  2351 09/12/23  0459 09/12/23  0756 09/12/23  0859 09/12/23  1218   *   < > 137 136  --  135*   K 4.2   < > 3.5 3.8  --  3.2*      < > 110 109  --  107   CO2 6*   < > 18* 20*  --  20*   *   < > 194* 188* 317* 220*   BUN 11   < > 2* 2*  --  3*   CREATININE 1.3   < > 0.6 0.6  --  0.6   CALCIUM 9.3   < > 8.2* 8.1*  --  7.9*   PROT 8.6*  --   --   --   --   --    ALBUMIN 3.9  --   --   --   --   --    BILITOT 0.2  --   --   --   --   --    ALKPHOS 147*  --   --   --   --   --    AST 9*  --   --   --   --   --    ALT 9*  --   --   --   --   --    ANIONGAP 24*   < > 9 7*  --  8    < > = values in this interval not displayed.       POCT Glucose:   Recent Labs   Lab 09/12/23  1031 09/12/23  1127 09/12/23  1217   POCTGLUCOSE 342* 188* 194*         Significant Imaging: I have reviewed all  pertinent imaging results/findings within the past 24 hours.

## 2023-09-12 NOTE — CARE UPDATE
09/12/23 0809   Patient Assessment/Suction   Level of Consciousness (AVPU) alert   Respiratory Effort Unlabored   Expansion/Accessory Muscles/Retractions no use of accessory muscles;no retractions;expansion symmetric   Rhythm/Pattern, Respiratory unlabored   PRE-TX-O2   Device (Oxygen Therapy) room air   SpO2 100 %   Pulse Oximetry Type Continuous   $ Pulse Oximetry - Multiple Charge Pulse Oximetry - Multiple   Pulse 91   Resp 16   Incentive Spirometer   $ Incentive Spirometer Charges other (see comments)  (will return after breakfast)
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
## 2023-09-12 NOTE — PLAN OF CARE
Problem: Diabetic Ketoacidosis  Goal: Fluid and Electrolyte Balance with Absence of Ketosis  Outcome: Ongoing, Progressing     Problem: Adult Inpatient Plan of Care  Goal: Plan of Care Review  Outcome: Ongoing, Progressing  Goal: Optimal Comfort and Wellbeing  Outcome: Ongoing, Progressing     Problem: Diabetes Comorbidity  Goal: Blood Glucose Level Within Targeted Range  Outcome: Ongoing, Progressing     Problem: Impaired Wound Healing  Goal: Optimal Wound Healing  Outcome: Ongoing, Progressing     Problem: Infection  Goal: Absence of Infection Signs and Symptoms  Outcome: Ongoing, Progressing

## 2023-09-12 NOTE — NURSING
Post op day 1 follow up with Dr. Adamson. Patients wound bed is noted with quick clot cloth in the lower wound bed. This was left alone. Cleaned periwound area with hibiclens. Cleaned the wound bed with wound cleanser and patted dry. Packed the wound with 1/2inch packing strip and covered with gauze. Wound care to be done daily. Patient reports her mother or room mate will be able to do wound care at home at this hospital discharge. Patient will also follow up with Dr. Jay for outpatient wound care. Patient tolerated wound care with minimal pain.   Wound measures 3cm x 3.2cm x 1cm. Did remove hair from the wound prior to packing.

## 2023-09-12 NOTE — ANESTHESIA PREPROCEDURE EVALUATION
09/11/2023  Adore Estes is a 32 y.o., female.      Pre-op Assessment    I have reviewed the Patient Summary Reports.     I have reviewed the Nursing Notes. I have reviewed the NPO Status.   I have reviewed the Medications.     Review of Systems  Anesthesia Hx:  No problems with previous Anesthesia    Social:  Non-Smoker    Hematology/Oncology:  Hematology Normal   Oncology Normal     EENT/Dental:EENT/Dental Normal   Cardiovascular:  Cardiovascular Normal     Pulmonary:  Pulmonary Normal    Renal/:  Renal/ Normal     Hepatic/GI:  Hepatic/GI Normal    Neurological:   Seizures    Endocrine:   Diabetes, poorly controlled, type 1    Dermatological:   Scalp abscess        Physical Exam  General: Well nourished, Cooperative, Alert and Oriented    Airway:  Mallampati: II   Mouth Opening: Normal  TM Distance: Normal  Tongue: Normal    Dental:  Intact    Chest/Lungs:  Normal Respiratory Rate    Heart:  Rate: Normal        Anesthesia Plan  Type of Anesthesia, risks & benefits discussed:    Anesthesia Type: Gen Supraglottic Airway  Intra-op Monitoring Plan: Standard ASA Monitors  Post Op Pain Control Plan: multimodal analgesia and IV/PO Opioids PRN  Induction:  IV  Airway Plan: , Post-Induction  Informed Consent: Informed consent signed with the Patient and all parties understand the risks and agree with anesthesia plan.  All questions answered.   ASA Score: 3    Ready For Surgery From Anesthesia Perspective.     .

## 2023-09-12 NOTE — HOSPITAL COURSE
Patient was admitted to the hospital in DKA.  Patient was monitored closely throughout hospital stay.  Patient was started on insulin drip and DKA pathway.  Patient was noted to have abscess to scalp on admission was started on IV vanc.  Was consulted.  BMP was trended throughout her hospital stay.  Patient underwent I&D in the OR on 09/11 with Dr. Adamson.  Intraoperative cultures were obtained.  Wound Care was consulted.  Anion gap closed and acidosis improved.  Patient was weaned off insulin GTT and started on basal bolus insulin.  Diabetic diet was ordered. Complained of right arm swelling. 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 Started on eliquis. Blood sugar remained controlled. Cultures resulted in staph aureus sensitive to doxycycline. Patient discharged home with course of oral doxycycline and eliquis. Patient verbalized understanding of discharge instructions and return precautions. She is to follow up with wound care.

## 2023-09-12 NOTE — ASSESSMENT & PLAN NOTE
Patient's FSGs are uncontrolled due to hyperglycemia on current medication regimen.  Last A1c reviewed-   Lab Results   Component Value Date    HGBA1C 13.5 (H) 07/31/2023     Most recent fingerstick glucose reviewed-   Recent Labs   Lab 09/12/23  0935 09/12/23  1031 09/12/23  1127 09/12/23  1217   POCTGLUCOSE 258* 342* 188* 194*     Current correctional scale  insulin infusion as below for DKA  Maintain anti-hyperglycemic dose as follows-   Antihyperglycemics (From admission, onward)    Start     Stop Route Frequency Ordered    09/12/23 0900  insulin detemir U-100 (Levemir) pen 17 Units         -- SubQ 2 times daily 09/12/23 0755    09/12/23 0834  insulin aspart U-100 pen 0-10 Units         -- SubQ Before meals & nightly PRN 09/12/23 0755    09/10/23 2130  insulin regular 1 Units/mL in sodium chloride 0.9% 100 mL infusion        Question Answer Comment   Initial dose (DO NOT CHANGE): 0.1 units/kg/hr    Insulin Rate Adjustment (DO NOT MODIFY ANSWER) [email or web address] DKA version [email or web address]        -- IV Continuous 09/10/23 2116        Hold Oral hypoglycemics while patient is in the hospital.    Case discussed with patient's Endocrinologist who recommended increasing patient's home levemir from 15 units BID to 17 units BID.   9/12: CO2 improved; Levemir ordered; weaning off insulin gtt; SSI

## 2023-09-12 NOTE — BRIEF OP NOTE
[facility]/Surg  Brief Operative Note    SUMMARY     Surgery Date: 9/11/2023     Surgeon(s) and Role:     * Ashkan Adamson III, MD - Primary    Assisting Surgeon: None    Pre-op Diagnosis:  Scalp abscess [L02.811]    Post-op Diagnosis:  Post-Op Diagnosis Codes:     * Scalp abscess [L02.811]    Procedure(s) (LRB):  INCISION AND DRAINAGE, ABSCESS (N/A)    Anesthesia: General    Operative Findings: drainage of scalp abscess. Debridement of non viable skin at wound edge.     Estimated Blood Loss: 50 mL    Estimated Blood Loss has been documented.         Specimens:   Specimen (24h ago, onward)      None            [id number]

## 2023-09-12 NOTE — PROGRESS NOTES
Criteria per anesthesia for transfer room Tolerating po fluids refused analgesic offered Transferred per room 223  Report given to Hyma

## 2023-09-12 NOTE — ANESTHESIA PROCEDURE NOTES
Intubation    Date/Time: 9/11/2023 8:39 PM    Performed by: Leonel Sen CRNA  Authorized by: Wally August MD    Intubation:     Induction:  Intravenous    Intubated:  N/a    Mask Ventilation:  N/a    Attempts:  1    Attempted By:  CRNA    Difficult Airway Encountered?: No      Complications:  None    Airway Device:  Supraglottic airway/LMA    Airway Device Size:  3.0    Style/Cuff Inflation:  Cuffed (inflated to minimal occlusive pressure)    Placement Verified By:  Capnometry    Complicating Factors:  None

## 2023-09-12 NOTE — TRANSFER OF CARE
"Anesthesia Transfer of Care Note    Patient: Adore Estes    Procedure(s) Performed: Procedure(s) (LRB):  INCISION AND DRAINAGE, ABSCESS (N/A)    Patient location: PACU    Anesthesia Type: general    Transport from OR: Transported from OR on 2-3 L/min O2 by NC with adequate spontaneous ventilation    Post pain: adequate analgesia    Post assessment: no apparent anesthetic complications    Post vital signs: stable    Level of consciousness: awake    Nausea/Vomiting: no nausea/vomiting    Complications: none    Transfer of care protocol was followed      Last vitals:   Visit Vitals  /80   Pulse 82   Temp 36.5 °C (97.7 °F) (Skin)   Resp 17   Ht 5' 10" (1.778 m)   Wt 65.5 kg (144 lb 6.4 oz)   LMP 08/14/2023   SpO2 98%   Breastfeeding No   BMI 20.72 kg/m²     "

## 2023-09-12 NOTE — CONSULTS
Chief complaint:  Nausea (Patient states that she is in diabetic ketoacidosis.), Vomiting, and Hyperglycemia      HPI:  Adore Estes is a 32 y.o. female presenting with an open wound of the posterior scalp. Pt had the wound I and D'ed, but there is still a significant area of fluid filled lesion on the head. Pt on Doxycycline, and the wound has not changed. 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 [employer]    Tobacco Use    Smoking status: Never    Smokeless tobacco: Never   Substance and Sexual Activity    Alcohol use: Yes     Alcohol/week: 3.0 standard drinks of alcohol     Types: 1 Glasses of wine, 1 Cans of beer, 1 Shots of liquor per week     Comment: 2 drink per week     Drug use: No    Sexual activity: Yes     Partners: Male     Birth control/protection: None, Condom     Social Determinants of Health     Financial Resource Strain: Unknown (11/19/2022)    Overall Financial Resource Strain (CARDIA)     Difficulty of Paying Living Expenses: Patient refused   Food Insecurity: Unknown (11/19/2022)    Hunger Vital Sign     Worried About Running Out of Food in the Last Year: Patient refused     Ran Out of Food in the Last Year: Patient refused   Transportation Needs: Unknown (11/19/2022)    PRAPARE - Transportation     Lack of Transportation (Medical): Patient refused     Lack of Transportation (Non-Medical): Patient refused   Physical Activity: Unknown (11/19/2022)    Exercise Vital Sign     Days of Exercise per Week: Patient refused     Minutes of Exercise per Session: Patient refused   Stress: Unknown (11/19/2022)    [employer] [place] [employer] -  Occupational Stress Questionnaire     Feeling of Stress : Patient refused   Social Connections: Unknown (2022)    Social Connection and Isolation Panel [NHANES]     Frequency of Communication with Friends and Family: Patient refused     Frequency of Social Gatherings with Friends and Family: Patient refused     Attends Anabaptism Services: Patient refused     Active Member of Clubs or Organizations: Patient refused     Attends Club or Organization Meetings: Patient refused     Marital Status: Patient refused   Housing Stability: Unknown (2022)    Housing Stability Vital Sign     Unable to Pay for Housing in the Last Year: Patient refused     Unstable Housing in the Last Year: Patient refused       Past Surgical History:   Procedure Laterality Date    APPENDECTOMY  2017     SECTION         Family History   Problem Relation Age of Onset    Asthma Sister     Ovarian cancer Sister     Breast cancer Maternal Aunt     Colon cancer Neg Hx        Review of patient's allergies indicates:   Allergen Reactions    Cerebyx [fosphenytoin] Itching    Amoxicillin Hives       No current facility-administered medications on file prior to encounter.     Current Outpatient Medications on File Prior to Encounter   Medication Sig Dispense Refill    doxycycline (VIBRAMYCIN) 100 MG Cap Take 1 capsule (100 mg total) by mouth 2 (two) times daily. for 10 days 19 capsule 0    insulin aspart U-100 (NOVOLOG) 100 unit/mL (3 mL) InPn pen To use with carb ratio 1:4, ISF: 20 MDD: 80 units 30 mL 11    insulin detemir U-100, Levemir, (LEVEMIR FLEXPEN) 100 unit/mL (3 mL) InPn pen Inject 15 Units into the skin 2 (two) times daily. 30 mL 3    blood sugar diagnostic Strp Check 5x daily for hypoglycemia.  True Metrix 500 strip 3       ROS: As per HPI and below:  Pertinent items are noted in HPI.      Physical Exam:     Vitals:    23 0900 23 1035 23 1100 23 1300   BP: 111/69  113/75 (!) 105/58   Pulse: 91 77 90 84  "  Resp: 16 (!) 6 16 20   Temp: 98.8 °F (37.1 °C)      TempSrc:       SpO2: 99% 99% 99% 100%   Weight:       Height:           BP  Min: 92/56  Max: 149/84  Temp  Av °F (36.7 °C)  Min: 97 °F (36.1 °C)  Max: 98.8 °F (37.1 °C)  Pulse  Av.5  Min: 66  Max: 123  Resp  Av.1  Min: 0  Max: 24  SpO2  Av.6 %  Min: 96 %  Max: 100 %  Height  Av' 10" (177.8 cm)  Min: 5' 10" (177.8 cm)  Max: 5' 10" (177.8 cm)  Weight  Av.4 kg (146 lb 6.5 oz)  Min: 65.5 kg (144 lb 6.4 oz)  Max: 68.6 kg (151 lb 3.8 oz)    Body mass index is 21.7 kg/m².          General:             Well developed, well nourished, no apparent distress  HEENT:              NCAT, no JVD, mucous membranes moist, EOM intact  Cardiovascular:  Regular rate and rhythm, normal S1, normal S2, No murmurs, rubs, or gallops  Respiratory:        Normal breath sounds, no wheezes, no rales, no rhonchi  Abdomen:           Bowel sounds present, non tender, non distended, no masses, no hepatojugular reflux  Extremities:        No clubbing, no cyanosis, no edema  Vascular:            2+ b/l radial.  Peripheral pulses intact.  No carotid bruits.  Neurological:      No focal deficits  Skin:                   No obvious rashes or erythema, large wound of the posterior scalp from an abscess that was drained. Wound has not improved since she had it drained.                   Lab Results   Component Value Date    WBC 7.80 2023    HGB 11.6 (L) 2023    HCT 35.0 (L) 2023    MCV 86 2023     2023     Lab Results   Component Value Date    CHOL 203 (H) 2022    CHOL 237 (H) 2021    CHOL 189 10/10/2020     Lab Results   Component Value Date    HDL 47 2022    HDL 84 (H) 2021    HDL 90 (H) 10/10/2020     Lab Results   Component Value Date    LDLCALC 123.2 2022    LDLCALC 130.2 2021    LDLCALC 89.4 10/10/2020     Lab Results   Component Value Date    TRIG 164 (H) 2022    TRIG 114 2021    TRIG " 48 10/10/2020     Lab Results   Component Value Date    CHOLHDL 23.2 11/19/2022    CHOLHDL 35.4 11/30/2021    CHOLHDL 47.6 10/10/2020     CMP  Recent Labs   Lab 09/12/23  1218   *   CALCIUM 7.9*   *   K 3.2*   CO2 20*      BUN 3*   CREATININE 0.6      Lab Results   Component Value Date    TSH 0.699 11/18/2022           Assessment and Recommendations       Diagnoses:    1. Traumatic wound to the posterior saclp    Plan:  1. Surgery was consulted for possible debridement in OR  2. Will FU at the wound clinic OP on DC    Complexity:    medium

## 2023-09-12 NOTE — ANESTHESIA POSTPROCEDURE EVALUATION
Anesthesia Post Evaluation    Patient: Adore Estes    Procedure(s) Performed: Procedure(s) (LRB):  INCISION AND DRAINAGE, ABSCESS (N/A)    Final Anesthesia Type: general      Patient location during evaluation: PACU  Patient participation: Yes- Able to Participate  Level of consciousness: awake and alert  Post-procedure vital signs: reviewed and stable  Pain management: adequate  Airway patency: patent    PONV status at discharge: No PONV  Anesthetic complications: no      Cardiovascular status: hemodynamically stable  Respiratory status: unassisted and room air  Hydration status: euvolemic  Follow-up not needed.          Vitals Value Taken Time   /83 09/11/23 2205   Temp 36.2 °C (97.2 °F) 09/11/23 2155   Pulse 63 09/11/23 2223   Resp 14 09/11/23 2223   SpO2 100 % 09/11/23 2223   Vitals shown include unvalidated device data.      Event Time   Out of Recovery 09/11/2023 22:00:00         Pain/Erica Score: Erica Score: 9 (9/11/2023  9:45 PM)

## 2023-09-12 NOTE — PROGRESS NOTES
Pharmacokinetic Assessment Follow Up: IV Vancomycin    Vancomycin serum concentration assessment(s):    The trough level was drawn incorrectly and cannot be used to guide therapy at this time. Dose was given right before blood was drawn    Vancomycin Regimen Plan:    Discontinue the scheduled vancomycin regimen and re-dose when the random level is less than 20 mcg/mL, next level to be drawn at 0930 on 9/12.    Drug levels (last 3 results):  Recent Labs   Lab Result Units 09/11/23  2344   Vancomycin-Trough ug/mL 21.0       Pharmacy will continue to follow and monitor vancomycin.    Please contact pharmacy at extension [phone] for questions regarding this assessment.    Thank you for the consult,   Nathan Olivares       Patient brief summary:  Adore Estes is a 32 y.o. female initiated on antimicrobial therapy with IV Vancomycin for treatment of skin & soft tissue infection        Drug Allergies:   Review of patient's allergies indicates:   Allergen Reactions    Cerebyx [fosphenytoin] Itching    Amoxicillin Hives       Actual Body Weight:   65.5 kg    Renal Function:   Estimated Creatinine Clearance: 103.4 mL/min (based on SCr of 0.8 mg/dL).,     Dialysis Method (if applicable):  N/A    CBC (last 72 hours):  Recent Labs   Lab Result Units 09/09/23  1822 09/10/23  2019 09/11/23  0352   WBC K/uL 10.86 15.06* 11.96   Hemoglobin g/dL 14.5 15.6 13.0   Hematocrit % 42.2 48.1 38.5   Platelets K/uL 361 452* 322   Gran % % 71.4 80.2* 75.4*   Lymph % % 19.4 13.4* 16.8*   Mono % % 7.5 4.4 6.3   Eosinophil % % 0.8 0.4 0.3   Basophil % % 0.3 0.4 0.4   Differential Method  Automated Automated Automated       Metabolic Panel (last 72 hours):  Recent Labs   Lab Result Units 09/09/23  1822 09/10/23  2019 09/10/23  2351 09/11/23  0352 09/11/23  0745 09/11/23  1640   Sodium mmol/L 136 133* 134* 131* 134* 134*   Potassium mmol/L 4.0 4.2 4.1 3.5 3.3* 3.2*   Chloride mmol/L 103 103 110 110 112* 107   CO2 mmol/L 16* 6* 8* 10* 12* 14*   Glucose  mg/dL 297* 478* 253* 233* 186* 156*   BUN mg/dL 8 11 10 8 8 5*   Creatinine mg/dL 0.7 1.3 0.8 0.8 0.8 0.8   Albumin g/dL  --  3.9  --   --   --   --    Total Bilirubin mg/dL  --  0.2  --   --   --   --    Alkaline Phosphatase U/L  --  147*  --   --   --   --    AST U/L  --  9*  --   --   --   --    ALT U/L  --  9*  --   --   --   --    Magnesium mg/dL  --  1.9  --   --   --   --    Phosphorus mg/dL  --  4.1  --  2.2*  --   --        Vancomycin Administrations:  vancomycin given in the last 96 hours                     vancomycin (VANCOCIN) 1,000 mg in dextrose 5 % (D5W) 250 mL IVPB (Vial-Mate) (mg) 1,000 mg New Bag 09/11/23 2230     1,000 mg New Bag  1121     1,000 mg New Bag 09/10/23 2320                    Microbiologic Results:  Microbiology Results (last 7 days)       Procedure Component Value Units Date/Time    Culture, Anaerobe [[id number]] Collected: 09/11/23 2103    Order Status: Sent Specimen: Wound from Head Updated: 09/11/23 2151    Aerobic culture [[id number]] Collected: 09/11/23 2103    Order Status: Sent Specimen: Wound from Head Updated: 09/11/23 2151    Gram stain [[id number]] Collected: 09/11/23 2103    Order Status: Sent Specimen: Wound from Head Updated: 09/11/23 2151

## 2023-09-12 NOTE — ASSESSMENT & PLAN NOTE
Scalp abscess s/p I & D in ED9/9  -Vancomycin  - wound care consulted  - General surgery consulted to re-evaluate for need for repeat I&D  9/12: POD 1 I&D in OR with Dr. Adamson; intraoperative cultures in process; prn pain medications; wound care following

## 2023-09-12 NOTE — DISCHARGE INSTRUCTIONS
Wound care to posterior head wound:  Clean wound with wound cleanser and pat dry. Pack wound bed with 1/2 inch packing strip. Cover with 4x4 gauze and secure with kerlix or headband provided by the patient. Change packing daily.  Note patient should change headband daily.

## 2023-09-12 NOTE — PROGRESS NOTES
[facility] Medicine  Progress Note    Patient Name: Adore Estes  MRN: [id number]  Patient Class: IP- Inpatient   Admission Date: 9/10/2023  Length of Stay: 2 days  Attending Physician: Cyndie Lloyd MD  Primary Care Provider: Octavio Goodwin MD        Subjective:     Principal Problem:Abscess        HPI:  Adore Estes a 32-year-old female with a past medical history of IDDM type 1 who presents wit and abdominal cramping that started this morning.  Patient was seen in the ED yesterday for laceration to her scalp.  She underwent an I&D and was discharged on p.o. doxycycline.  She reports she was able to take her morning dose of doxycycline, but unable to tolerate p.o. in the evening.  Patient denies fever and worsening drainage from her scalp abscess.  Patient has a history of DKA and states her symptoms today are similar.  ED workup revealed leukocytosis 15.06, and glucose 478.  Potassium 4.2, and sodium 133.  VBG revealed acidosis.  Patient received IV bolus NS and was referred to Hospital Medicine.  Patient seen in the ED with mother at bedside.  She reports feeling weak, nauseated with abdominal cramping.  She denies chest pain, shortness of breath, fever, chills, diarrhea and dysuria.  Patient will be to Hospital Medicine for further evaluation and management.            Overview/Hospital Course:  No notes on file    Interval History: Patient seen examined.  Overnight notes reviewed.  Patient states she is feeling better today. CO2 improved on am labs. Transitioning off insulin gtt to SSI and Levemir 17 units (pt's endocrinologist recommended increasing home levemir from 15 units BID to 17 units BID). Diabetic diet ordered. Patient is POD 1 I&D in OR with Dr. Adamson. Wound care and general surgery following.  Patient remains on IV vanc. Intra-operative wound cultures in process.     Review of Systems   Constitutional:  Positive for fatigue.   Respiratory:  Negative for  shortness of breath.    Cardiovascular:  Negative for chest pain.   Gastrointestinal:  Negative for abdominal pain and nausea.   Skin:  Positive for wound.     Objective:     Vital Signs (Most Recent):  Temp: 98.8 °F (37.1 °C) (09/12/23 0900)  Pulse: 84 (09/12/23 1300)  Resp: 20 (09/12/23 1300)  BP: (!) 105/58 (09/12/23 1300)  SpO2: 100 % (09/12/23 1300) Vital Signs (24h Range):  Temp:  [97 °F (36.1 °C)-98.8 °F (37.1 °C)] 98.8 °F (37.1 °C)  Pulse:  [66-96] 84  Resp:  [0-24] 20  SpO2:  [96 %-100 %] 100 %  BP: ()/(56-90) 105/58     Weight: 68.6 kg (151 lb 3.8 oz)  Body mass index is 21.7 kg/m².    Intake/Output Summary (Last 24 hours) at 9/12/2023 1440  Last data filed at 9/12/2023 0610  Gross per 24 hour   Intake 740 ml   Output 3410 ml   Net -2670 ml           Physical Exam  Vitals and nursing note reviewed.   Constitutional:       General: She is not in acute distress.     Appearance: Normal appearance. She is normal weight.   HENT:      Head: Normocephalic and atraumatic.      Mouth/Throat:      Mouth: Mucous membranes are moist.      Pharynx: Oropharynx is clear.   Eyes:      Extraocular Movements: Extraocular movements intact.      Pupils: Pupils are equal, round, and reactive to light.   Cardiovascular:      Rate and Rhythm: Normal rate and regular rhythm.      Pulses: Normal pulses.      Heart sounds: Normal heart sounds.   Pulmonary:      Effort: Pulmonary effort is normal.      Breath sounds: Normal breath sounds.   Abdominal:      General: Abdomen is flat. Bowel sounds are normal.      Palpations: Abdomen is soft.      Tenderness: There is no abdominal tenderness.   Musculoskeletal:         General: Normal range of motion.      Cervical back: Normal range of motion and neck supple.   Skin:     General: Skin is warm.      Capillary Refill: Capillary refill takes 2 to 3 seconds.      Findings: Abscess (To scalp) present.             Comments: Pod 1 I&D with Dr. Adamson.  Dressing in place   Wound Care  following   Neurological:      General: No focal deficit present.      Mental Status: She is alert and oriented to person, place, and time. Mental status is at baseline.   Psychiatric:         Mood and Affect: Mood normal.         Behavior: Behavior normal.               Significant Labs: All pertinent labs within the past 24 hours have been reviewed.  CBC:   Recent Labs   Lab 09/10/23  2019 09/11/23  0352 09/12/23  0459   WBC 15.06* 11.96 7.80   HGB 15.6 13.0 11.6*   HCT 48.1 38.5 35.0*   * 322 315       CMP:   Recent Labs   Lab 09/10/23  2019 09/10/23  2351 09/12/23  0459 09/12/23  0756 09/12/23  0859 09/12/23  1218   *   < > 137 136  --  135*   K 4.2   < > 3.5 3.8  --  3.2*      < > 110 109  --  107   CO2 6*   < > 18* 20*  --  20*   *   < > 194* 188* 317* 220*   BUN 11   < > 2* 2*  --  3*   CREATININE 1.3   < > 0.6 0.6  --  0.6   CALCIUM 9.3   < > 8.2* 8.1*  --  7.9*   PROT 8.6*  --   --   --   --   --    ALBUMIN 3.9  --   --   --   --   --    BILITOT 0.2  --   --   --   --   --    ALKPHOS 147*  --   --   --   --   --    AST 9*  --   --   --   --   --    ALT 9*  --   --   --   --   --    ANIONGAP 24*   < > 9 7*  --  8    < > = values in this interval not displayed.       POCT Glucose:   Recent Labs   Lab 09/12/23  1031 09/12/23  1127 09/12/23  1217   POCTGLUCOSE 342* 188* 194*         Significant Imaging: I have reviewed all pertinent imaging results/findings within the past 24 hours.      Assessment/Plan:      * Abscess  Scalp abscess s/p I & D in ED9/9  -Vancomycin  - wound care consulted  - General surgery consulted to re-evaluate for need for repeat I&D  9/12: POD 1 I&D in OR with Dr. Adamson; intraoperative cultures in process; prn pain medications; wound care following    Type 1 diabetes mellitus with ketoacidosis without coma  Patient's FSGs are uncontrolled due to hyperglycemia on current medication regimen.  Last A1c reviewed-   Lab Results   Component Value Date    HGBA1C 13.5  (H) 07/31/2023     Most recent fingerstick glucose reviewed-   Recent Labs   Lab 09/12/23  0935 09/12/23  1031 09/12/23  1127 09/12/23  1217   POCTGLUCOSE 258* 342* 188* 194*     Current correctional scale  insulin infusion as below for DKA  Maintain anti-hyperglycemic dose as follows-   Antihyperglycemics (From admission, onward)    Start     Stop Route Frequency Ordered    09/12/23 0900  insulin detemir U-100 (Levemir) pen 17 Units         -- SubQ 2 times daily 09/12/23 0755 09/12/23 0834  insulin aspart U-100 pen 0-10 Units         -- SubQ Before meals & nightly PRN 09/12/23 0755    09/10/23 2130  insulin regular 1 Units/mL in sodium chloride 0.9% 100 mL infusion        Question Answer Comment   Initial dose (DO NOT CHANGE): 0.1 units/kg/hr    Insulin Rate Adjustment (DO NOT MODIFY ANSWER) \\[email or web address] DKA version [email or web address]        -- IV Continuous 09/10/23 2116        Hold Oral hypoglycemics while patient is in the hospital.    Case discussed with patient's Endocrinologist who recommended increasing patient's home levemir from 15 units BID to 17 units BID.   9/12: CO2 improved; Levemir ordered; weaning off insulin gtt; SSI    Hypokalemia  Patient has hypokalemia which is Acute and currently controlled. Most recent potassium levels reviewed-   Lab Results   Component Value Date    K 3.2 (L) 09/12/2023   . Will continue potassium replacement per protocol and recheck repeat levels after replacement completed.           VTE Risk Mitigation (From admission, onward)         Ordered     IP VTE HIGH RISK PATIENT  Once         09/11/23 2210     Place sequential compression device  Until discontinued         09/11/23 2210     Place KIANA hose  Until discontinued         09/10/23 2116     Place sequential compression device  Until discontinued         09/10/23 2116                Discharge Planning   TRICE: 9/15/2023     Code Status: Full Code   Is the patient  medically ready for discharge?:     Reason for patient still in hospital (select all that apply): Patient trending condition, Laboratory test, Treatment, Consult recommendations and Pending disposition  Discharge Plan A: Home with family                  Katerina Patel PA-C  Department of Hospital Medicine   [facility]/Surg

## 2023-09-12 NOTE — PROGRESS NOTES
To PACU holding awaiting surgery Alert oriented Vss on room air. Mother at bedside Set up for text alerts     2030 To OR per bed

## 2023-09-12 NOTE — ASSESSMENT & PLAN NOTE
Patient has hypokalemia which is Acute and currently controlled. Most recent potassium levels reviewed-   Lab Results   Component Value Date    K 3.2 (L) 09/12/2023   . Will continue potassium replacement per protocol and recheck repeat levels after replacement completed.

## 2023-09-13 LAB
ANION GAP SERPL CALC-SCNC: 11 MMOL/L (ref 8–16)
BASOPHILS # BLD AUTO: 0.03 K/UL (ref 0–0.2)
BASOPHILS NFR BLD: 0.5 % (ref 0–1.9)
BUN SERPL-MCNC: 5 MG/DL (ref 6–20)
CALCIUM SERPL-MCNC: 8 MG/DL (ref 8.7–10.5)
CHLORIDE SERPL-SCNC: 106 MMOL/L (ref 95–110)
CO2 SERPL-SCNC: 22 MMOL/L (ref 23–29)
CREAT SERPL-MCNC: 0.5 MG/DL (ref 0.5–1.4)
DIFFERENTIAL METHOD: ABNORMAL
EOSINOPHIL # BLD AUTO: 0.1 K/UL (ref 0–0.5)
EOSINOPHIL NFR BLD: 1.5 % (ref 0–8)
ERYTHROCYTE [DISTWIDTH] IN BLOOD BY AUTOMATED COUNT: 11.8 % (ref 11.5–14.5)
EST. GFR  (NO RACE VARIABLE): >60 ML/MIN/1.73 M^2
GLUCOSE SERPL-MCNC: 210 MG/DL (ref 70–110)
GRAM STN SPEC: NORMAL
GRAM STN SPEC: NORMAL
HCT VFR BLD AUTO: 32.2 % (ref 37–48.5)
HGB BLD-MCNC: 11.4 G/DL (ref 12–16)
IMM GRANULOCYTES # BLD AUTO: 0.02 K/UL (ref 0–0.04)
IMM GRANULOCYTES NFR BLD AUTO: 0.3 % (ref 0–0.5)
LYMPHOCYTES # BLD AUTO: 1.9 K/UL (ref 1–4.8)
LYMPHOCYTES NFR BLD: 31.3 % (ref 18–48)
MCH RBC QN AUTO: 29.5 PG (ref 27–31)
MCHC RBC AUTO-ENTMCNC: 35.4 G/DL (ref 32–36)
MCV RBC AUTO: 83 FL (ref 82–98)
MONOCYTES # BLD AUTO: 0.5 K/UL (ref 0.3–1)
MONOCYTES NFR BLD: 8.2 % (ref 4–15)
NEUTROPHILS # BLD AUTO: 3.6 K/UL (ref 1.8–7.7)
NEUTROPHILS NFR BLD: 58.2 % (ref 38–73)
NRBC BLD-RTO: 0 /100 WBC
PHOSPHATE SERPL-MCNC: 4.1 MG/DL (ref 2.7–4.5)
PLATELET # BLD AUTO: 263 K/UL (ref 150–450)
PMV BLD AUTO: 8.9 FL (ref 9.2–12.9)
POCT GLUCOSE: 234 MG/DL (ref 70–110)
POCT GLUCOSE: 248 MG/DL (ref 70–110)
POCT GLUCOSE: 267 MG/DL (ref 70–110)
POCT GLUCOSE: 298 MG/DL (ref 70–110)
POTASSIUM SERPL-SCNC: 3 MMOL/L (ref 3.5–5.1)
POTASSIUM SERPL-SCNC: 3.9 MMOL/L (ref 3.5–5.1)
RBC # BLD AUTO: 3.86 M/UL (ref 4–5.4)
SODIUM SERPL-SCNC: 139 MMOL/L (ref 136–145)
VANCOMYCIN TROUGH SERPL-MCNC: 9.8 UG/ML (ref 10–22)
WBC # BLD AUTO: 6.2 K/UL (ref 3.9–12.7)

## 2023-09-13 PROCEDURE — 25000003 PHARM REV CODE 250: Performed by: NURSE PRACTITIONER

## 2023-09-13 PROCEDURE — 63600175 PHARM REV CODE 636 W HCPCS: Performed by: NURSE PRACTITIONER

## 2023-09-13 PROCEDURE — 94799 UNLISTED PULMONARY SVC/PX: CPT

## 2023-09-13 PROCEDURE — 20600001 HC STEP DOWN PRIVATE ROOM

## 2023-09-13 PROCEDURE — 94761 N-INVAS EAR/PLS OXIMETRY MLT: CPT

## 2023-09-13 PROCEDURE — 80048 BASIC METABOLIC PNL TOTAL CA: CPT | Performed by: INTERNAL MEDICINE

## 2023-09-13 PROCEDURE — 84132 ASSAY OF SERUM POTASSIUM: CPT | Performed by: INTERNAL MEDICINE

## 2023-09-13 PROCEDURE — 99900035 HC TECH TIME PER 15 MIN (STAT)

## 2023-09-13 PROCEDURE — 25000003 PHARM REV CODE 250

## 2023-09-13 PROCEDURE — 84100 ASSAY OF PHOSPHORUS: CPT | Performed by: SURGERY

## 2023-09-13 PROCEDURE — 85025 COMPLETE CBC W/AUTO DIFF WBC: CPT | Performed by: SURGERY

## 2023-09-13 PROCEDURE — 25000003 PHARM REV CODE 250: Performed by: SURGERY

## 2023-09-13 PROCEDURE — 80202 ASSAY OF VANCOMYCIN: CPT | Performed by: INTERNAL MEDICINE

## 2023-09-13 PROCEDURE — 36415 COLL VENOUS BLD VENIPUNCTURE: CPT | Performed by: INTERNAL MEDICINE

## 2023-09-13 RX ORDER — LANOLIN ALCOHOL/MO/W.PET/CERES
800 CREAM (GRAM) TOPICAL
Status: DISCONTINUED | OUTPATIENT
Start: 2023-09-13 | End: 2023-09-14 | Stop reason: HOSPADM

## 2023-09-13 RX ORDER — SODIUM,POTASSIUM PHOSPHATES 280-250MG
2 POWDER IN PACKET (EA) ORAL
Status: DISCONTINUED | OUTPATIENT
Start: 2023-09-13 | End: 2023-09-14 | Stop reason: HOSPADM

## 2023-09-13 RX ADMIN — INSULIN ASPART 4 UNITS: 100 INJECTION, SOLUTION INTRAVENOUS; SUBCUTANEOUS at 11:09

## 2023-09-13 RX ADMIN — INSULIN ASPART 6 UNITS: 100 INJECTION, SOLUTION INTRAVENOUS; SUBCUTANEOUS at 06:09

## 2023-09-13 RX ADMIN — VANCOMYCIN HYDROCHLORIDE 1000 MG: 1 INJECTION, POWDER, LYOPHILIZED, FOR SOLUTION INTRAVENOUS at 04:09

## 2023-09-13 RX ADMIN — INSULIN DETEMIR 17 UNITS: 100 INJECTION, SOLUTION SUBCUTANEOUS at 08:09

## 2023-09-13 RX ADMIN — INSULIN ASPART 3 UNITS: 100 INJECTION, SOLUTION INTRAVENOUS; SUBCUTANEOUS at 08:09

## 2023-09-13 RX ADMIN — VANCOMYCIN HYDROCHLORIDE 1000 MG: 1 INJECTION, POWDER, LYOPHILIZED, FOR SOLUTION INTRAVENOUS at 01:09

## 2023-09-13 RX ADMIN — FAMOTIDINE 20 MG: 10 INJECTION, SOLUTION INTRAVENOUS at 09:09

## 2023-09-13 RX ADMIN — HYDROCODONE BITARTRATE AND ACETAMINOPHEN 1 TABLET: 10; 325 TABLET ORAL at 10:09

## 2023-09-13 RX ADMIN — MUPIROCIN 1 G: 20 OINTMENT TOPICAL at 09:09

## 2023-09-13 RX ADMIN — HYDROCODONE BITARTRATE AND ACETAMINOPHEN 1 TABLET: 10; 325 TABLET ORAL at 11:09

## 2023-09-13 RX ADMIN — INSULIN ASPART 4 UNITS: 100 INJECTION, SOLUTION INTRAVENOUS; SUBCUTANEOUS at 09:09

## 2023-09-13 RX ADMIN — FAMOTIDINE 20 MG: 10 INJECTION, SOLUTION INTRAVENOUS at 08:09

## 2023-09-13 RX ADMIN — INSULIN DETEMIR 17 UNITS: 100 INJECTION, SOLUTION SUBCUTANEOUS at 09:09

## 2023-09-13 RX ADMIN — POTASSIUM BICARBONATE 60 MEQ: 391 TABLET, EFFERVESCENT ORAL at 06:09

## 2023-09-13 RX ADMIN — VANCOMYCIN HYDROCHLORIDE 1000 MG: 1 INJECTION, POWDER, LYOPHILIZED, FOR SOLUTION INTRAVENOUS at 08:09

## 2023-09-13 RX ADMIN — POTASSIUM BICARBONATE 60 MEQ: 391 TABLET, EFFERVESCENT ORAL at 04:09

## 2023-09-13 NOTE — PLAN OF CARE
Problem: Adult Inpatient Plan of Care  Goal: Plan of Care Review  Outcome: Ongoing, Progressing  Goal: Patient-Specific Goal (Individualized)  Outcome: Ongoing, Progressing  Goal: Absence of Hospital-Acquired Illness or Injury  Outcome: Ongoing, Progressing  Goal: Optimal Comfort and Wellbeing  Outcome: Ongoing, Progressing  Goal: Readiness for Transition of Care  Outcome: Ongoing, Progressing     Problem: Diabetic Ketoacidosis  Goal: Fluid and Electrolyte Balance with Absence of Ketosis  Outcome: Ongoing, Progressing     Problem: Diabetes Comorbidity  Goal: Blood Glucose Level Within Targeted Range  Outcome: Ongoing, Progressing     Problem: Infection  Goal: Absence of Infection Signs and Symptoms  Outcome: Ongoing, Progressing

## 2023-09-13 NOTE — ASSESSMENT & PLAN NOTE
Scalp abscess s/p I & D in ED9/9  -Vancomycin  - wound care consulted  - General surgery consulted to re-evaluate for need for repeat I&D  9/12: POD 1 I&D in OR with Dr. Adamson; IV abx;  intraoperative cultures in process; prn pain medications; wound care following  9/13: POD 2 I&D in OR with Dr. Adamson; IV abx; intraoperative cultures in process; prn pain medications; wound care following

## 2023-09-13 NOTE — ASSESSMENT & PLAN NOTE
Patient's FSGs are controlled on current medication regimen.  Last A1c reviewed-   Lab Results   Component Value Date    HGBA1C 13.5 (H) 07/31/2023     Most recent fingerstick glucose reviewed-   Recent Labs   Lab 09/12/23  1548 09/12/23  2059 09/13/23  0756 09/13/23  1134   POCTGLUCOSE 332* 306* 234* 248*     Current correctional scale  Medium  Maintain anti-hyperglycemic dose as follows-   Antihyperglycemics (From admission, onward)    Start     Stop Route Frequency Ordered    09/12/23 0900  insulin detemir U-100 (Levemir) pen 17 Units         -- SubQ 2 times daily 09/12/23 0755    09/12/23 0834  insulin aspart U-100 pen 0-10 Units         -- SubQ Before meals & nightly PRN 09/12/23 0755        Hold Oral hypoglycemics while patient is in the hospital.    Case discussed with patient's Endocrinologist who recommended increasing patient's home levemir from 15 units BID to 17 units BID.   9/12: CO2 improved; Levemir ordered; weaning off insulin gtt; SSI  9/13: Levemir 17 units BID and SSI; off insulin gtt; transfer out step down unit

## 2023-09-13 NOTE — PROGRESS NOTES
Pharmacokinetic Assessment Follow Up: IV Vancomycin    Vancomycin serum concentration assessment(s):    The trough level was drawn correctly and can be used to guide therapy at this time. The measurement is below the desired definitive target range of 10 to 15 mcg/mL.    Vancomycin Regimen Plan:    Change regimen to Vancomycin 1000 mg IV every 8 hours with next serum trough concentration measured at 2000 prior to 3rd dose on 9/13    Drug levels (last 3 results):  Recent Labs   Lab Result Units 09/11/23  2344 09/12/23  0755 09/13/23  0343   Vancomycin, Random ug/mL  --  4.1  --    Vancomycin-Trough ug/mL 21.0  --  9.8*       Pharmacy will continue to follow and monitor vancomycin.    Please contact pharmacy at extension [phone] for questions regarding this assessment.    Thank you for the consult,   Nathan Olivares       Patient brief summary:  Adore Estes is a 32 y.o. female initiated on antimicrobial therapy with IV Vancomycin for treatment of skin & soft tissue infection        Drug Allergies:   Review of patient's allergies indicates:   Allergen Reactions    Cerebyx [fosphenytoin] Itching    Amoxicillin Hives       Actual Body Weight:   68.6 kg    Renal Function:   Estimated Creatinine Clearance: 123.6 mL/min (based on SCr of 0.7 mg/dL).,     Dialysis Method (if applicable):  N/A    CBC (last 72 hours):  Recent Labs   Lab Result Units 09/10/23  2019 09/11/23  0352 09/12/23  0459 09/13/23  0343   WBC K/uL 15.06* 11.96 7.80 6.20   Hemoglobin g/dL 15.6 13.0 11.6* 11.4*   Hematocrit % 48.1 38.5 35.0* 32.2*   Platelets K/uL 452* 322 315 263   Gran % % 80.2* 75.4* 80.6* 58.2   Lymph % % 13.4* 16.8* 13.2* 31.3   Mono % % 4.4 6.3 5.4 8.2   Eosinophil % % 0.4 0.3 0.1 1.5   Basophil % % 0.4 0.4 0.3 0.5   Differential Method  Automated Automated Automated Automated       Metabolic Panel (last 72 hours):  Recent Labs   Lab Result Units 09/10/23  2019 09/10/23  2351 09/11/23  0352 09/11/23  0745 09/11/23  1640 09/11/23  2343  09/12/23  0459 09/12/23  0756 09/12/23  0859 09/12/23  1218 09/12/23  1555   Sodium mmol/L 133* 134* 131* 134* 134* 137 137 136  --  135* 134*   Potassium mmol/L 4.2 4.1 3.5 3.3* 3.2* 3.2* 3.5 3.8  --  3.2* 3.6   Chloride mmol/L 103 110 110 112* 107 111* 110 109  --  107 105   CO2 mmol/L 6* 8* 10* 12* 14* 17* 18* 20*  --  20* 16*   Glucose mg/dL 478* 253* 233* 186* 156* 155* 194* 188* 317* 220* 367*   BUN mg/dL 11 10 8 8 5* 3* 2* 2*  --  3* 4*   Creatinine mg/dL 1.3 0.8 0.8 0.8 0.8 0.6 0.6 0.6  --  0.6 0.7   Albumin g/dL 3.9  --   --   --   --   --   --   --   --   --   --    Total Bilirubin mg/dL 0.2  --   --   --   --   --   --   --   --   --   --    Alkaline Phosphatase U/L 147*  --   --   --   --   --   --   --   --   --   --    AST U/L 9*  --   --   --   --   --   --   --   --   --   --    ALT U/L 9*  --   --   --   --   --   --   --   --   --   --    Magnesium mg/dL 1.9  --   --   --   --   --   --   --   --   --   --    Phosphorus mg/dL 4.1  --  2.2*  --   --   --  1.3*  --   --   --   --        Vancomycin Administrations:  vancomycin given in the last 96 hours                     vancomycin 1,250 mg in dextrose 5 % (D5W) 250 mL IVPB (Vial-Mate) (mg) 1,250 mg New Bag 09/12/23 2052     1,250 mg New Bag  1251    vancomycin (VANCOCIN) 1,000 mg in dextrose 5 % (D5W) 250 mL IVPB (Vial-Mate) (mg) 1,000 mg New Bag 09/11/23 2230     1,000 mg New Bag  1121     1,000 mg New Bag 09/10/23 2320                    Microbiologic Results:  Microbiology Results (last 7 days)       Procedure Component Value Units Date/Time    Gram stain [[id number]] Collected: 09/11/23 2103    Order Status: Completed Specimen: Wound from Head Updated: 09/12/23 1604     Gram Stain Result Few WBC's      Few Gram positive cocci    Culture, Anaerobe [[id number]] Collected: 09/11/23 2103    Order Status: Sent Specimen: Wound from Head Updated: 09/12/23 0936    Aerobic culture [[id number]] Collected: 09/11/23 2103    Order Status: Sent Specimen:  Wound from Head Updated: 09/12/23 0859

## 2023-09-13 NOTE — PROGRESS NOTES
[facility] Medicine  Progress Note    Patient Name: Adore Estes  MRN: [id number]  Patient Class: IP- Inpatient   Admission Date: 9/10/2023  Length of Stay: 3 days  Attending Physician: Cyndie Lloyd MD  Primary Care Provider: Octavio Goodwin MD        Subjective:     Principal Problem:Abscess        HPI:  Adore Estes a 32-year-old female with a past medical history of IDDM type 1 who presents wit and abdominal cramping that started this morning.  Patient was seen in the ED yesterday for laceration to her scalp.  She underwent an I&D and was discharged on p.o. doxycycline.  She reports she was able to take her morning dose of doxycycline, but unable to tolerate p.o. in the evening.  Patient denies fever and worsening drainage from her scalp abscess.  Patient has a history of DKA and states her symptoms today are similar.  ED workup revealed leukocytosis 15.06, and glucose 478.  Potassium 4.2, and sodium 133.  VBG revealed acidosis.  Patient received IV bolus NS and was referred to Hospital Medicine.  Patient seen in the ED with mother at bedside.  She reports feeling weak, nauseated with abdominal cramping.  She denies chest pain, shortness of breath, fever, chills, diarrhea and dysuria.  Patient will be to Hospital Medicine for further evaluation and management.            Overview/Hospital Course:  Patient was admitted to the hospital in DKA.  Patient was monitored closely throughout hospital stay.  Patient was started on insulin drip and DKA pathway.  Patient was noted to have abscess to scalp on admission was started on IV vanc.  Was consulted.  BMP was trended throughout her hospital stay.  Patient underwent I&D in the OR on 09/11 with Dr. Adamson.  Intraoperative cultures were obtained.  Wound Care was consulted.  Anion gap closed and acidosis improved.  Patient was weaned off insulin GTT and started on basal bolus insulin.  Diabetic diet was ordered.      Interval History:  Patient seen examined.  Overnight notes reviewed.  Patient states she is feeling better today. Transitioned off insulin gtt and on levemir and SSI. Will transfer out of stepdown unit. Patient is POD 2 I&D in OR with Dr. Adamson. Wound care and general surgery following.  Patient remains on IV vanc. Intra-operative wound cultures in process.     Review of Systems   Constitutional:  Positive for fatigue.   Respiratory:  Negative for shortness of breath.    Cardiovascular:  Negative for chest pain.   Gastrointestinal:  Negative for abdominal pain and nausea.   Skin:  Positive for wound.     Objective:     Vital Signs (Most Recent):  Temp: 97.4 °F (36.3 °C) (09/13/23 1145)  Pulse: 96 (09/13/23 1145)  Resp: 20 (09/13/23 1145)  BP: 101/71 (09/13/23 1145)  SpO2: 96 % (09/13/23 1145) Vital Signs (24h Range):  Temp:  [97.4 °F (36.3 °C)-98.5 °F (36.9 °C)] 97.4 °F (36.3 °C)  Pulse:  [66-96] 96  Resp:  [13-20] 20  SpO2:  [96 %-100 %] 96 %  BP: (101-128)/(58-79) 101/71     Weight: 68.6 kg (151 lb 3.8 oz)  Body mass index is 21.7 kg/m².    Intake/Output Summary (Last 24 hours) at 9/13/2023 1148  Last data filed at 9/13/2023 0800  Gross per 24 hour   Intake 2513.69 ml   Output 1250 ml   Net 1263.69 ml           Physical Exam  Vitals and nursing note reviewed.   Constitutional:       General: She is not in acute distress.     Appearance: Normal appearance. She is normal weight.   HENT:      Head: Normocephalic and atraumatic.      Mouth/Throat:      Mouth: Mucous membranes are moist.      Pharynx: Oropharynx is clear.   Eyes:      Extraocular Movements: Extraocular movements intact.      Pupils: Pupils are equal, round, and reactive to light.   Cardiovascular:      Rate and Rhythm: Normal rate and regular rhythm.      Pulses: Normal pulses.      Heart sounds: Normal heart sounds.   Pulmonary:      Effort: Pulmonary effort is normal.      Breath sounds: Normal breath sounds.   Abdominal:      General: Abdomen is flat. Bowel sounds  are normal.      Palpations: Abdomen is soft.      Tenderness: There is no abdominal tenderness.   Musculoskeletal:         General: Normal range of motion.      Cervical back: Normal range of motion and neck supple.   Skin:     General: Skin is warm.      Capillary Refill: Capillary refill takes 2 to 3 seconds.      Findings: Abscess (To scalp) present.             Comments: Pod 1 I&D with Dr. Adamson.  Dressing in place   Wound Care following   Neurological:      General: No focal deficit present.      Mental Status: She is alert and oriented to person, place, and time. Mental status is at baseline.   Psychiatric:         Mood and Affect: Mood normal.         Behavior: Behavior normal.               Significant Labs: All pertinent labs within the past 24 hours have been reviewed.  CBC:   Recent Labs   Lab 09/12/23  0459 09/13/23  0343   WBC 7.80 6.20   HGB 11.6* 11.4*   HCT 35.0* 32.2*    263       CMP:   Recent Labs   Lab 09/12/23  1218 09/12/23  1555 09/13/23  0343 09/13/23  1038   * 134* 139  --    K 3.2* 3.6 3.0* 3.9    105 106  --    CO2 20* 16* 22*  --    * 367* 210*  --    BUN 3* 4* 5*  --    CREATININE 0.6 0.7 0.5  --    CALCIUM 7.9* 8.2* 8.0*  --    ANIONGAP 8 13 11  --        POCT Glucose:   Recent Labs   Lab 09/12/23  2059 09/13/23  0756 09/13/23  1134   POCTGLUCOSE 306* 234* 248*         Significant Imaging: I have reviewed all pertinent imaging results/findings within the past 24 hours.      Assessment/Plan:      * Abscess  Scalp abscess s/p I & D in ED9/9  -Vancomycin  - wound care consulted  - General surgery consulted to re-evaluate for need for repeat I&D  9/12: POD 1 I&D in OR with Dr. Adamson; IV abx;  intraoperative cultures in process; prn pain medications; wound care following  9/13: POD 2 I&D in OR with Dr. Adamson; IV abx; intraoperative cultures in process; prn pain medications; wound care following      Type 1 diabetes mellitus with ketoacidosis without  coma  Patient's FSGs are controlled on current medication regimen.  Last A1c reviewed-   Lab Results   Component Value Date    HGBA1C 13.5 (H) 07/31/2023     Most recent fingerstick glucose reviewed-   Recent Labs   Lab 09/12/23  1548 09/12/23  2059 09/13/23  0756 09/13/23  1134   POCTGLUCOSE 332* 306* 234* 248*     Current correctional scale  Medium  Maintain anti-hyperglycemic dose as follows-   Antihyperglycemics (From admission, onward)    Start     Stop Route Frequency Ordered    09/12/23 0900  insulin detemir U-100 (Levemir) pen 17 Units         -- SubQ 2 times daily 09/12/23 0755    09/12/23 0834  insulin aspart U-100 pen 0-10 Units         -- SubQ Before meals & nightly PRN 09/12/23 0755        Hold Oral hypoglycemics while patient is in the hospital.    Case discussed with patient's Endocrinologist who recommended increasing patient's home levemir from 15 units BID to 17 units BID.   9/12: CO2 improved; Levemir ordered; weaning off insulin gtt; SSI  9/13: Levemir 17 units BID and SSI; off insulin gtt; transfer out step down unit     Hypokalemia  Patient has hypokalemia which is Acute and currently controlled. Most recent potassium levels reviewed-   Lab Results   Component Value Date    K 3.2 (L) 09/12/2023   . Will continue potassium replacement per protocol and recheck repeat levels after replacement completed.           VTE Risk Mitigation (From admission, onward)         Ordered     IP VTE HIGH RISK PATIENT  Once         09/11/23 2210     Place sequential compression device  Until discontinued         09/11/23 2210     Place KIANA hose  Until discontinued         09/10/23 2116     Place sequential compression device  Until discontinued         09/10/23 2116                Discharge Planning   TRICE: 9/15/2023     Code Status: Full Code   Is the patient medically ready for discharge?:     Reason for patient still in hospital (select all that apply): Patient trending condition, Laboratory test, Treatment and  Pending disposition  Discharge Plan A: Home with family                  Katerina Patel PA-C  Department of Hospital Medicine   [facility]/Surg

## 2023-09-13 NOTE — EICU
Video rounding completed.  Pt resting quiet, no distress noted.  B/P 118/79, HR 75, RR 15, POx 97% on room air.

## 2023-09-13 NOTE — NURSING
Bedside report given to Sagrario WRIGHT who will resume pt care. Pt resting in bed. NAD noted. VSS.

## 2023-09-13 NOTE — RESPIRATORY THERAPY
09/13/23 0758   Patient Assessment/Suction   Level of Consciousness (AVPU) alert   Respiratory Effort Normal;Unlabored   Expansion/Accessory Muscles/Retractions expansion symmetric;no retractions;no use of accessory muscles   Rhythm/Pattern, Respiratory depth regular;pattern regular;unlabored   Cough Frequency no cough   PRE-TX-O2   Device (Oxygen Therapy) room air   SpO2 97 %   Pulse Oximetry Type Continuous   $ Pulse Oximetry - Multiple Charge Pulse Oximetry - Multiple   Pulse 88   Resp 16   /62   Incentive Spirometer   $ Incentive Spirometer Charges done with encouragement   Incentive Spirometer Predicted Level (mL) 2400   Administration (IS) proper technique demonstrated   Number of Repetitions (IS) 10   Level Incentive Spirometer (mL) 2500   Patient Tolerance (IS) good;no adverse signs/symptoms present

## 2023-09-13 NOTE — EICU
Intervention Initiated From:  COR / EICU    Lenny intervened regarding:  Rounding (Video assessment)    Nurse Notified:  No    Doctor Notified:  No    Comments: Rounding done. Alert. B/P 128/79, HR 75, sat 97 on room air. Side rails up x3

## 2023-09-13 NOTE — PLAN OF CARE
Hospital follow up scheduled with PCP for 9/21/23 @ 2:40    Referral placed for outpatient wound care with Dr. Jay     09/13/23 2161   Post-Acute Status   Hospital Resources/Appts/Education Provided Appointments scheduled and added to AVS

## 2023-09-13 NOTE — SUBJECTIVE & OBJECTIVE
Interval History: Patient seen examined.  Overnight notes reviewed.  Patient states she is feeling better today. Transitioned off insulin gtt and on levemir and SSI. Will transfer out of stepdown unit. Patient is POD 2 I&D in OR with Dr. Adamson. Wound care and general surgery following.  Patient remains on IV vanc. Intra-operative wound cultures in process.     Review of Systems   Constitutional:  Positive for fatigue.   Respiratory:  Negative for shortness of breath.    Cardiovascular:  Negative for chest pain.   Gastrointestinal:  Negative for abdominal pain and nausea.   Skin:  Positive for wound.     Objective:     Vital Signs (Most Recent):  Temp: 97.4 °F (36.3 °C) (09/13/23 1145)  Pulse: 96 (09/13/23 1145)  Resp: 20 (09/13/23 1145)  BP: 101/71 (09/13/23 1145)  SpO2: 96 % (09/13/23 1145) Vital Signs (24h Range):  Temp:  [97.4 °F (36.3 °C)-98.5 °F (36.9 °C)] 97.4 °F (36.3 °C)  Pulse:  [66-96] 96  Resp:  [13-20] 20  SpO2:  [96 %-100 %] 96 %  BP: (101-128)/(58-79) 101/71     Weight: 68.6 kg (151 lb 3.8 oz)  Body mass index is 21.7 kg/m².    Intake/Output Summary (Last 24 hours) at 9/13/2023 1148  Last data filed at 9/13/2023 0800  Gross per 24 hour   Intake 2513.69 ml   Output 1250 ml   Net 1263.69 ml           Physical Exam  Vitals and nursing note reviewed.   Constitutional:       General: She is not in acute distress.     Appearance: Normal appearance. She is normal weight.   HENT:      Head: Normocephalic and atraumatic.      Mouth/Throat:      Mouth: Mucous membranes are moist.      Pharynx: Oropharynx is clear.   Eyes:      Extraocular Movements: Extraocular movements intact.      Pupils: Pupils are equal, round, and reactive to light.   Cardiovascular:      Rate and Rhythm: Normal rate and regular rhythm.      Pulses: Normal pulses.      Heart sounds: Normal heart sounds.   Pulmonary:      Effort: Pulmonary effort is normal.      Breath sounds: Normal breath sounds.   Abdominal:      General: Abdomen is  flat. Bowel sounds are normal.      Palpations: Abdomen is soft.      Tenderness: There is no abdominal tenderness.   Musculoskeletal:         General: Normal range of motion.      Cervical back: Normal range of motion and neck supple.   Skin:     General: Skin is warm.      Capillary Refill: Capillary refill takes 2 to 3 seconds.      Findings: Abscess (To scalp) present.             Comments: Pod 1 I&D with Dr. Adamson.  Dressing in place   Wound Care following   Neurological:      General: No focal deficit present.      Mental Status: She is alert and oriented to person, place, and time. Mental status is at baseline.   Psychiatric:         Mood and Affect: Mood normal.         Behavior: Behavior normal.               Significant Labs: All pertinent labs within the past 24 hours have been reviewed.  CBC:   Recent Labs   Lab 09/12/23  0459 09/13/23  0343   WBC 7.80 6.20   HGB 11.6* 11.4*   HCT 35.0* 32.2*    263       CMP:   Recent Labs   Lab 09/12/23  1218 09/12/23  1555 09/13/23  0343 09/13/23  1038   * 134* 139  --    K 3.2* 3.6 3.0* 3.9    105 106  --    CO2 20* 16* 22*  --    * 367* 210*  --    BUN 3* 4* 5*  --    CREATININE 0.6 0.7 0.5  --    CALCIUM 7.9* 8.2* 8.0*  --    ANIONGAP 8 13 11  --        POCT Glucose:   Recent Labs   Lab 09/12/23  2059 09/13/23  0756 09/13/23  1134   POCTGLUCOSE 306* 234* 248*         Significant Imaging: I have reviewed all pertinent imaging results/findings within the past 24 hours.

## 2023-09-14 VITALS
WEIGHT: 151.25 LBS | TEMPERATURE: 98 F | RESPIRATION RATE: 18 BRPM | SYSTOLIC BLOOD PRESSURE: 113 MMHG | DIASTOLIC BLOOD PRESSURE: 83 MMHG | HEIGHT: 69 IN | OXYGEN SATURATION: 98 % | HEART RATE: 94 BPM | BODY MASS INDEX: 22.4 KG/M2

## 2023-09-14 LAB
ANION GAP SERPL CALC-SCNC: 10 MMOL/L (ref 8–16)
BACTERIA SPEC AEROBE CULT: ABNORMAL
BASOPHILS # BLD AUTO: 0.01 K/UL (ref 0–0.2)
BASOPHILS NFR BLD: 0.1 % (ref 0–1.9)
BUN SERPL-MCNC: 7 MG/DL (ref 6–20)
CALCIUM SERPL-MCNC: 8.5 MG/DL (ref 8.7–10.5)
CHLORIDE SERPL-SCNC: 100 MMOL/L (ref 95–110)
CO2 SERPL-SCNC: 26 MMOL/L (ref 23–29)
CREAT SERPL-MCNC: 0.6 MG/DL (ref 0.5–1.4)
DIFFERENTIAL METHOD: ABNORMAL
EOSINOPHIL # BLD AUTO: 0.1 K/UL (ref 0–0.5)
EOSINOPHIL NFR BLD: 1.3 % (ref 0–8)
ERYTHROCYTE [DISTWIDTH] IN BLOOD BY AUTOMATED COUNT: 11.6 % (ref 11.5–14.5)
EST. GFR  (NO RACE VARIABLE): >60 ML/MIN/1.73 M^2
GLUCOSE SERPL-MCNC: 229 MG/DL (ref 70–110)
HCT VFR BLD AUTO: 34.1 % (ref 37–48.5)
HGB BLD-MCNC: 11.9 G/DL (ref 12–16)
IMM GRANULOCYTES # BLD AUTO: 0.02 K/UL (ref 0–0.04)
IMM GRANULOCYTES NFR BLD AUTO: 0.3 % (ref 0–0.5)
LYMPHOCYTES # BLD AUTO: 1.9 K/UL (ref 1–4.8)
LYMPHOCYTES NFR BLD: 24.6 % (ref 18–48)
MCH RBC QN AUTO: 29.3 PG (ref 27–31)
MCHC RBC AUTO-ENTMCNC: 34.9 G/DL (ref 32–36)
MCV RBC AUTO: 84 FL (ref 82–98)
MONOCYTES # BLD AUTO: 0.6 K/UL (ref 0.3–1)
MONOCYTES NFR BLD: 7.9 % (ref 4–15)
NEUTROPHILS # BLD AUTO: 5.1 K/UL (ref 1.8–7.7)
NEUTROPHILS NFR BLD: 65.8 % (ref 38–73)
NRBC BLD-RTO: 0 /100 WBC
PHOSPHATE SERPL-MCNC: 3.9 MG/DL (ref 2.7–4.5)
PLATELET # BLD AUTO: 273 K/UL (ref 150–450)
PMV BLD AUTO: 8.9 FL (ref 9.2–12.9)
POCT GLUCOSE: 222 MG/DL (ref 70–110)
POCT GLUCOSE: 363 MG/DL (ref 70–110)
POCT GLUCOSE: 384 MG/DL (ref 70–110)
POCT GLUCOSE: 473 MG/DL (ref 70–110)
POTASSIUM SERPL-SCNC: 3.5 MMOL/L (ref 3.5–5.1)
RBC # BLD AUTO: 4.06 M/UL (ref 4–5.4)
SODIUM SERPL-SCNC: 136 MMOL/L (ref 136–145)
VANCOMYCIN TROUGH SERPL-MCNC: 8.4 UG/ML (ref 10–22)
WBC # BLD AUTO: 7.68 K/UL (ref 3.9–12.7)

## 2023-09-14 PROCEDURE — 25000003 PHARM REV CODE 250

## 2023-09-14 PROCEDURE — 84100 ASSAY OF PHOSPHORUS: CPT | Performed by: SURGERY

## 2023-09-14 PROCEDURE — 94799 UNLISTED PULMONARY SVC/PX: CPT

## 2023-09-14 PROCEDURE — 36415 COLL VENOUS BLD VENIPUNCTURE: CPT | Performed by: INTERNAL MEDICINE

## 2023-09-14 PROCEDURE — 94760 N-INVAS EAR/PLS OXIMETRY 1: CPT

## 2023-09-14 PROCEDURE — 25000003 PHARM REV CODE 250: Performed by: NURSE PRACTITIONER

## 2023-09-14 PROCEDURE — 80048 BASIC METABOLIC PNL TOTAL CA: CPT | Performed by: INTERNAL MEDICINE

## 2023-09-14 PROCEDURE — 25000003 PHARM REV CODE 250: Performed by: SURGERY

## 2023-09-14 PROCEDURE — 25000003 PHARM REV CODE 250: Performed by: INTERNAL MEDICINE

## 2023-09-14 PROCEDURE — 85025 COMPLETE CBC W/AUTO DIFF WBC: CPT | Performed by: SURGERY

## 2023-09-14 PROCEDURE — 80202 ASSAY OF VANCOMYCIN: CPT | Performed by: INTERNAL MEDICINE

## 2023-09-14 PROCEDURE — 63600175 PHARM REV CODE 636 W HCPCS: Performed by: INTERNAL MEDICINE

## 2023-09-14 RX ORDER — IBUPROFEN 200 MG
24 TABLET ORAL
Status: DISCONTINUED | OUTPATIENT
Start: 2023-09-15 | End: 2023-09-14 | Stop reason: HOSPADM

## 2023-09-14 RX ORDER — INSULIN DETEMIR 100 [IU]/ML
17 INJECTION, SOLUTION SUBCUTANEOUS 2 TIMES DAILY
Qty: 30 ML | Refills: 3 | Status: SHIPPED | OUTPATIENT
Start: 2023-09-14 | End: 2024-03-04

## 2023-09-14 RX ORDER — DOXYCYCLINE 100 MG/1
100 CAPSULE ORAL EVERY 12 HOURS
Qty: 18 CAPSULE | Refills: 0 | Status: SHIPPED | OUTPATIENT
Start: 2023-09-14 | End: 2023-09-23

## 2023-09-14 RX ORDER — IBUPROFEN 200 MG
16 TABLET ORAL
Status: DISCONTINUED | OUTPATIENT
Start: 2023-09-15 | End: 2023-09-14 | Stop reason: HOSPADM

## 2023-09-14 RX ORDER — ONDANSETRON 4 MG/1
4 TABLET, ORALLY DISINTEGRATING ORAL EVERY 12 HOURS PRN
Qty: 20 TABLET | Refills: 0 | Status: SHIPPED | OUTPATIENT
Start: 2023-09-14 | End: 2023-09-24

## 2023-09-14 RX ORDER — GLUCAGON 1 MG
1 KIT INJECTION
Status: DISCONTINUED | OUTPATIENT
Start: 2023-09-15 | End: 2023-09-14 | Stop reason: HOSPADM

## 2023-09-14 RX ADMIN — INSULIN ASPART 8 UNITS: 100 INJECTION, SOLUTION INTRAVENOUS; SUBCUTANEOUS at 11:09

## 2023-09-14 RX ADMIN — ACETAMINOPHEN 650 MG: 325 TABLET, FILM COATED ORAL at 07:09

## 2023-09-14 RX ADMIN — INSULIN DETEMIR 17 UNITS: 100 INJECTION, SOLUTION SUBCUTANEOUS at 11:09

## 2023-09-14 RX ADMIN — FAMOTIDINE 20 MG: 10 INJECTION, SOLUTION INTRAVENOUS at 11:09

## 2023-09-14 RX ADMIN — VANCOMYCIN HYDROCHLORIDE 1250 MG: 1.25 INJECTION, POWDER, LYOPHILIZED, FOR SOLUTION INTRAVENOUS at 04:09

## 2023-09-14 RX ADMIN — POTASSIUM BICARBONATE 50 MEQ: 977.5 TABLET, EFFERVESCENT ORAL at 05:09

## 2023-09-14 RX ADMIN — APIXABAN 10 MG: 2.5 TABLET, FILM COATED ORAL at 12:09

## 2023-09-14 RX ADMIN — MUPIROCIN 1 G: 20 OINTMENT TOPICAL at 11:09

## 2023-09-14 RX ADMIN — INSULIN ASPART 8 UNITS: 100 INJECTION, SOLUTION INTRAVENOUS; SUBCUTANEOUS at 12:09

## 2023-09-14 RX ADMIN — VANCOMYCIN HYDROCHLORIDE 1250 MG: 1.25 INJECTION, POWDER, LYOPHILIZED, FOR SOLUTION INTRAVENOUS at 12:09

## 2023-09-14 NOTE — PLAN OF CARE
CM called [facility] to check price and availability of eliquis for pt, they stated pt's insurance would not cover it.     KRISTOFER spoke with ELINOR De La Garza who sent prescription to [facility] to see if it could be more affordable. KRISTOFER spoke to Libby who stated she signed pt up for eliquis copay card and she now has $0 copay for eliquis and it is in stock.

## 2023-09-14 NOTE — NURSING
Discharge instructions given patient verbalized understanding. PIV removed with catheter intact. Telemetry removed and returned to monitor room. Patient escorted to lobby.

## 2023-09-14 NOTE — PROGRESS NOTES
Adore Estes [id number] is a 32 y.o. female who has been consulted for vancomycin dosing.    Pharmacy consult for vancomycin dosing in no longer required.  Vancomycin was discontinued.    Thank you for allowing us to participate in this patient's care.     Ronnie Fregoso, PharmD  [phone]

## 2023-09-14 NOTE — PROGRESS NOTES
Adore Estes [id number] is a 32 y.o. female who has been consulted for vancomycin dosing.    Scheduled vancomycin trough lab has been changed to 9/14/23 at 0300.      Patient will be followed by pharmacy for changes in renal function, toxicity, and efficacy.    Thank you for allowing us to participate in this patient's care.     Tesha Ding Mai, PharmD

## 2023-09-14 NOTE — EICU
Intervention Initiated From:  COR / EICU    Lenny intervened regarding:  Rounding (Video assessment)    Nurse Notified:  No    Doctor Notified:  No    Comments: Rounding done. Patient resting in bed. Patient off all IV fluids and monitor. Side rails up x2.    Griseofulvin Pregnancy And Lactation Text: This medication is Pregnancy Category X and is known to cause serious birth defects. It is unknown if this medication is excreted in breast milk but breast feeding should be avoided.

## 2023-09-14 NOTE — PROGRESS NOTES
Pharmacokinetic Assessment Follow Up: IV Vancomycin    Vancomycin serum concentration assessment(s):    The trough level was drawn correctly and can be used to guide therapy at this time. The measurement is below the desired definitive target range of 10 to 15 mcg/mL.    Vancomycin Regimen Plan:    Change regimen to Vancomycin 1250 mg IV every 8 hours with next serum trough concentration measured at 0330 prior to 4th dose on 09/15/23    Drug levels (last 3 results):  Recent Labs   Lab Result Units 09/11/23  2344 09/12/23  0755 09/13/23  0343 09/14/23  0313   Vancomycin, Random ug/mL  --  4.1  --   --    Vancomycin-Trough ug/mL 21.0  --  9.8* 8.4*       Pharmacy will continue to follow and monitor vancomycin.    Please contact pharmacy at extension [phone] for questions regarding this assessment.    Thank you for the consult,   Graham Fregoso       Patient brief summary:  Adore Estes is a 32 y.o. female initiated on antimicrobial therapy with IV Vancomycin for treatment of skin & soft tissue infection    The patient's current regimen is 1000mg q8h    Drug Allergies:   Review of patient's allergies indicates:   Allergen Reactions    Cerebyx [fosphenytoin] Itching    Amoxicillin Hives       Actual Body Weight:   68.6kg    Renal Function:   Estimated Creatinine Clearance: 144.2 mL/min (based on SCr of 0.6 mg/dL).,     CBC (last 72 hours):  Recent Labs   Lab Result Units 09/12/23  0459 09/13/23  0343 09/14/23  0314   WBC K/uL 7.80 6.20 7.68   Hemoglobin g/dL 11.6* 11.4* 11.9*   Hematocrit % 35.0* 32.2* 34.1*   Platelets K/uL 315 263 273   Gran % % 80.6* 58.2 65.8   Lymph % % 13.2* 31.3 24.6   Mono % % 5.4 8.2 7.9   Eosinophil % % 0.1 1.5 1.3   Basophil % % 0.3 0.5 0.1   Differential Method  Automated Automated Automated       Metabolic Panel (last 72 hours):  Recent Labs   Lab Result Units 09/11/23  0745 09/11/23  1640 09/11/23  2344 09/12/23  0459 09/12/23  0756 09/12/23  0859 09/12/23  1218 09/12/23  1551  09/13/23  0343 09/13/23  1038 09/14/23  0314   Sodium mmol/L 134* 134* 137 137 136  --  135* 134* 139  --  136   Potassium mmol/L 3.3* 3.2* 3.2* 3.5 3.8  --  3.2* 3.6 3.0* 3.9 3.5   Chloride mmol/L 112* 107 111* 110 109  --  107 105 106  --  100   CO2 mmol/L 12* 14* 17* 18* 20*  --  20* 16* 22*  --  26   Glucose mg/dL 186* 156* 155* 194* 188* 317* 220* 367* 210*  --  229*   BUN mg/dL 8 5* 3* 2* 2*  --  3* 4* 5*  --  7   Creatinine mg/dL 0.8 0.8 0.6 0.6 0.6  --  0.6 0.7 0.5  --  0.6   Phosphorus mg/dL  --   --   --  1.3*  --   --   --   --  4.1  --  3.9       Vancomycin Administrations:  vancomycin given in the last 96 hours                     vancomycin (VANCOCIN) 1,000 mg in dextrose 5 % (D5W) 250 mL IVPB (Vial-Mate) (mg) 1,000 mg New Bag 09/13/23 2010     1,000 mg New Bag  1349     1,000 mg New Bag  0431    vancomycin 1,250 mg in dextrose 5 % (D5W) 250 mL IVPB (Vial-Mate) (mg) 1,250 mg New Bag 09/12/23 2052     1,250 mg New Bag  1251    vancomycin (VANCOCIN) 1,000 mg in dextrose 5 % (D5W) 250 mL IVPB (Vial-Mate) (mg) 1,000 mg New Bag 09/11/23 2230     1,000 mg New Bag  1121     1,000 mg New Bag 09/10/23 2320                    Microbiologic Results:  Microbiology Results (last 7 days)       Procedure Component Value Units Date/Time    Gram stain [[id number]] Collected: 09/11/23 2103    Order Status: Completed Specimen: Wound from Head Updated: 09/13/23 1452     Gram Stain Result Few WBC's      Few Gram positive cocci    Aerobic culture [[id number]]  (Abnormal) Collected: 09/11/23 2103    Order Status: Completed Specimen: Wound from Head Updated: 09/13/23 0922     Aerobic Bacterial Culture STAPHYLOCOCCUS AUREUS  Moderate  Susceptibility pending      Culture, Anaerobe [[id number]] Collected: 09/11/23 2103    Order Status: Sent Specimen: Wound from Head Updated: 09/12/23 0936

## 2023-09-14 NOTE — PROGRESS NOTES
[facility] - Med/Surg  Adult Nutrition  Progress Note    SUMMARY       Recommendations  1) Change diet to DM 1500 kcal diet   2) weigh weekly or as needed   3) per endo notes: correction factor 20 to goal of 120 mg/dl, insulin/carb ratio 1:4   4) Change basal insulin per MD recommendation 17 units BID and recommend endocrine follow up      Goals: 1) PO Intakes > 75% of meals at f/u 2) Blood sugars < 300 mg/dl by f/u  Nutrition Goal Status: new  Communication of RD Recs: reviewed with physician (POC, sticky note)    Assessment and Plan    Altered nutrition related lab values  R/t  excess carb intakes and likely, infection, inadequate insulin infusion  As evidenced by elevated glucose and elevated A1C  Intervention: nutrition education, collaboration with other providers  new    Malnutrition Assessment     Skin (Micronutrient):  (Kit = 23, head wound)   Micronutrient Evaluation Summary: suspected deficiency  Micronutrient Evaluation Comments: check iron   Subcutaneous Fat (Malnutrition): other (see comments) (WDL)  Muscle Mass (Malnutrition): other (see comments) (WDL)                         Reason for Assessment    Reason For Assessment: RD follow-up  Diagnosis:  (DKA)  Relevant Medical History: DM 1 ( bsal/bolus insulin pens)  Interdisciplinary Rounds: did not attend    General Information Comments: 33 y/o female admitted with DKA, history of poorly controlled DM1. See nutrition education note 9/11/23- insulin/ carb ratio 1:4 and correction factor 20 for goal of 120 mg/dl. Spoke with endocrinologist who recommends increasing basal unsulin to 17 units BID. Wound noted to head, caron added. PO intakes > 75% most meals on DM 2000 kcal diet. Denies eating snacks from home. Sliding scale being given, will change PO diet to DM 1500 kcal.    Nutrition Discharge Planning: To be determined- DM 1800 kcal diet    Nutrition Risk Screen    Nutrition Risk Screen: no indicators present    Nutrition/Diet  "History    Patient Reported Diet/Restrictions/Preferences: general  Spiritual, Cultural Beliefs, Judaism Practices, Values that Affect Care: no  Food Allergies: NKFA  Factors Affecting Nutritional Intake: None identified at this time    Anthropometrics    Temp: 98 °F (36.7 °C)  Height Method: Stated  Height: 5' 9" (175.3 cm)  Height (inches): 69 in  Weight Method: Bed Scale  Weight: 68.6 kg (151 lb 3.8 oz)  Weight (lb): 151.24 lb  Ideal Body Weight (IBW), Female: 145 lb  % Ideal Body Weight, Female (lb): 104.3 %  BMI (Calculated): 22.3  BMI Grade: 18.5-24.9 - normal       Lab/Procedures/Meds    Pertinent Labs Reviewed: reviewed  BMP  Lab Results   Component Value Date     09/14/2023    K 3.5 09/14/2023     09/14/2023    CO2 26 09/14/2023    BUN 7 09/14/2023    CREATININE 0.6 09/14/2023    CALCIUM 8.5 (L) 09/14/2023    ANIONGAP 10 09/14/2023    EGFRNORACEVR >60 09/14/2023     Recent Labs   Lab 09/14/23  1233   POCTGLUCOSE 363*     Lab Results   Component Value Date    HGBA1C 13.5 (H) 07/31/2023     Lab Results   Component Value Date    ALBUMIN 3.9 09/10/2023       Pertinent Medications Reviewed: reviewed  Pertinent Medications Comments: insulin    Estimated/Assessed Needs    Weight Used For Calorie Calculations: 68.6 kg (151 lb 3.8 oz)  Energy Calorie Requirements (kcal): MSJ ( x 1.2) = 1750 kcal  Energy Need Method: Nunnelly- Nik  Protein Requirements: 0.8-1 g protein/kg ( 54-68 g)  Weight Used For Protein Calculations: 68.6 kg (151 lb 3.8 oz)  Fluid Requirements (mL): 1750 ml or per MD  Estimated Fluid Requirement Method: RDA Method  CHO Requirement: 196-240 g      Nutrition Prescription Ordered    Current Diet Order: DM 2000 kcal + Nicholas BID    Evaluation of Received Nutrient/Fluid Intake    Energy Calories Required: meeting needs  Protein Required: meeting needs  Fluid Required: meeting needs  Tolerance: tolerating     Intake/Output Summary (Last 24 hours) at 9/14/2023 1406  Last data filed at " 9/14/2023 1310  Gross per 24 hour   Intake 1373.27 ml   Output --   Net 1373.27 ml       % Intake of Estimated Energy Needs: %%  % Meal Intake: %    Nutrition Risk    Level of Risk/Frequency of Follow-up:  (1 x weekly)     Monitor and Evaluation    Food and Nutrient Intake: energy intake, food and beverage intake  Food and Nutrient Adminstration: diet order  Knowledge/Beliefs/Attitudes: food and nutrition knowledge/skill, beliefs and attitudes  Anthropometric Measurements: weight  Biochemical Data, Medical Tests and Procedures: electrolyte and renal panel, glucose/endocrine profile  Nutrition-Focused Physical Findings: overall appearance, skin     Nutrition Follow-Up    RD Follow-up?: Yes

## 2023-09-14 NOTE — NURSING
Patient called stated her Iv access was leaking. Notified Frederic, Frederic was ok with pt not to get dose of Vancomycin. Orders given to give another PRN dose of Insulin Aspart (PRN) blood sugar.

## 2023-09-14 NOTE — PLAN OF CARE
Pt clear for DC from CM standpoint. Discharging home.     Email sent to Daniella with [employer] to schedule hospital follow up. Added this to AVS.     09/14/23 5801   Final Note   Assessment Type Final Discharge Note   Anticipated Discharge Disposition Home

## 2023-09-14 NOTE — NURSING
Blood sugar in left hand  473  and in right hand 384. Frederic notified, PRN insulin given for coverage.

## 2023-09-14 NOTE — PLAN OF CARE
Problem: Adult Inpatient Plan of Care  Goal: Absence of Hospital-Acquired Illness or Injury  Outcome: Ongoing, Progressing     Problem: Diabetes Comorbidity  Goal: Blood Glucose Level Within Targeted Range  Outcome: Ongoing, Progressing     Problem: Impaired Wound Healing  Goal: Optimal Wound Healing  Outcome: Ongoing, Progressing   Pt remains in bed with cardiac monitor in place. Iv abx given as ordered. Wound care performed per orders. Prn meds given for pain. Bed alarm set, call light in reach.

## 2023-09-14 NOTE — RESPIRATORY THERAPY
09/14/23 0804   Patient Assessment/Suction   Level of Consciousness (AVPU) alert   Respiratory Effort Normal;Unlabored   Expansion/Accessory Muscles/Retractions expansion symmetric;no retractions;no use of accessory muscles   All Lung Fields Breath Sounds Anterior:;Lateral:;clear;equal bilaterally   Rhythm/Pattern, Respiratory depth regular;pattern regular;unlabored;no shortness of breath reported   Cough Frequency no cough   PRE-TX-O2   Device (Oxygen Therapy) room air   SpO2 98 %   Pulse Oximetry Type Intermittent   $ Pulse Oximetry - Single Charge Pulse Oximetry - Single   Pulse 74   Resp 18   Incentive Spirometer   $ Incentive Spirometer Charges done with encouragement   Incentive Spirometer Predicted Level (mL) 2400   Administration (IS) self-administered   Number of Repetitions (IS) 10   Level Incentive Spirometer (mL) 2500   Patient Tolerance (IS) good;no adverse signs/symptoms present

## 2023-09-14 NOTE — PLAN OF CARE
Recommendations   1) Change diet to DM 1500 kcal diet   2) weigh weekly or as needed   3) per endo notes: correction factor 20 to goal of 120 mg/dl, insulin/carb ratio 1:4   4) Change basal insulin per MD recommendation 17 units BID and recommend endocrine follow up    Goals: 1) PO Intakes > 75% of meals at f/u 2) Blood sugars < 300 mg/dl by f/u  Nutrition Goal Status: new  Communication of RD Recs: reviewed with physician (POC, sticky note)

## 2023-09-15 LAB — BACTERIA SPEC ANAEROBE CULT: NORMAL

## 2023-09-15 NOTE — DISCHARGE SUMMARY
[facility]  Discharge Summary      Patient Name: Adore Estes  MRN: [id number]  [facility]: [id number]  Patient Class: IP- Inpatient  Admission Date: 9/10/2023  Hospital Length of Stay: 4 days  Discharge Date and Time: 9/14/2023  3:51 PM  Attending Physician: No att. providers found   Discharging Provider: Frederic Francisco PA-C  Primary Care Provider: No primary care provider on file.    Primary Care Team: Networked reference to record PCT     HPI:   Adore Estes a 32-year-old female with a past medical history of IDDM type 1 who presents wit and abdominal cramping that started this morning.  Patient was seen in the ED yesterday for laceration to her scalp.  She underwent an I&D and was discharged on p.o. doxycycline.  She reports she was able to take her morning dose of doxycycline, but unable to tolerate p.o. in the evening.  Patient denies fever and worsening drainage from her scalp abscess.  Patient has a history of DKA and states her symptoms today are similar.  ED workup revealed leukocytosis 15.06, and glucose 478.  Potassium 4.2, and sodium 133.  VBG revealed acidosis.  Patient received IV bolus NS and was referred to Hospital Medicine.  Patient seen in the ED with mother at bedside.  She reports feeling weak, nauseated with abdominal cramping.  She denies chest pain, shortness of breath, fever, chills, diarrhea and dysuria.  Patient will be to Hospital Medicine for further evaluation and management.            Procedure(s) (LRB):  INCISION AND DRAINAGE, ABSCESS (N/A)      Hospital Course:   Patient was admitted to the hospital in DKA.  Patient was monitored closely throughout hospital stay.  Patient was started on insulin drip and DKA pathway.  Patient was noted to have abscess to scalp on admission was started on IV vanc.  Was consulted.  BMP was trended throughout her hospital stay.  Patient underwent I&D in the OR on 09/11 with Dr. Adamson.  Intraoperative cultures  were obtained.  Wound Care was consulted.  Anion gap closed and acidosis improved.  Patient was weaned off insulin GTT and started on basal bolus insulin.  Diabetic diet was ordered. Complained of right arm swelling. 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 Started on eliquis. Blood sugar remained controlled. Cultures resulted in staph aureus sensitive to doxycycline. Patient discharged home with course of oral doxycycline and eliquis. Patient verbalized understanding of discharge instructions and return precautions. She is to follow up with wound care.        Goals of Care Treatment Preferences:  Code Status: Full Code      Consults:   Consults (From admission, onward)        Status Ordering Provider     Inpatient consult to Midline team  Once        Provider:  (Not yet assigned)    Completed LIZZ KATE     Inpatient consult to General Surgery  Once        Provider:  Blair Daley III, MD    Completed BLAIR DALEY III     Inpatient consult to Registered Dietitian/Nutritionist  Once        Provider:  (Not yet assigned)    Completed MARY RDZ          No new Assessment & Plan notes have been filed under this hospital service since the last note was generated.  Service: Hospital Medicine    Final Active Diagnoses:    Diagnosis Date Noted POA    PRINCIPAL PROBLEM:  Abscess [L02.91] 09/10/2023 Yes    Scalp abscess [L02.811] 09/12/2023 Unknown    Diabetic ketoacidosis without coma associated with type 1 diabetes mellitus [E10.10] 09/12/2023 Unknown    Hypokalemia [E87.6] 11/19/2022 Yes    Type 1 diabetes mellitus with ketoacidosis without coma [E10.10] 05/05/2016 Yes      Problems Resolved During this Admission:       Discharged Condition: fair    Disposition: Home or Self Care    Follow Up:   Follow-up Information     Osvaldo Jay, DO Follow up in 1 week(s).    Specialty: Wound Care  Why: For wound re-check    appt requested  Contact information:  [address]  [place]  [place] [address]  [phone]             [facility]. Schedule an appointment as soon as possible for a visit in 2 week(s).    Why: hospital follow up appt requested. the clinic should call you to schedule  Contact information:  [place] [address]  [phone]                       Patient Instructions:      Ambulatory referral/consult to Wound Clinic   Standing Status: Future   Referral Priority: Routine Referral Type: Consultation   Referral Reason: Specialty Services Required   Referred to Provider: EDELMIRA ANDRES Requested Specialty: Wound Care   Number of Visits Requested: 1     Ambulatory referral/consult to Diabetes Education   Standing Status: Future   Referral Priority: Routine Referral Type: Consultation   Referral Reason: Specialty Services Required   Requested Specialty: Diabetes   Number of Visits Requested: 1 Expiration Date: 09/14/24     Diet Adult Regular     Notify your health care provider if you experience any of the following:  temperature >100.4     Notify your health care provider if you experience any of the following:  persistent nausea and vomiting or diarrhea     Notify your health care provider if you experience any of the following:  severe uncontrolled pain     Notify your health care provider if you experience any of the following:  redness, tenderness, or signs of infection (pain, swelling, redness, odor or green/yellow discharge around incision site)     Notify your health care provider if you experience any of the following:  increased confusion or weakness     Notify your health care provider if you experience any of the following:  persistent dizziness, light-headedness, or visual disturbances     Activity as tolerated       Significant Diagnostic Studies: Labs:   CMP   Recent Labs   Lab 09/14/23  0314      K 3.5      CO2 26   *   BUN 7   CREATININE 0.6   CALCIUM 8.5*   ANIONGAP 10    and CBC   Recent Labs   Lab  09/14/23  0314   WBC 7.68   HGB 11.9*   HCT 34.1*          Pending Diagnostic Studies:     None         Medications:  Reconciled Home Medications:      Medication List      START taking these medications    apixaban 5 mg Tab  Commonly known as: ELIQUIS  Take 2 tablets (10 mg total) by mouth 2 (two) times daily for 7 days, THEN 1 tablet (5 mg total) 2 (two) times daily.  Start taking on: September 14, 2023     doxycycline 100 MG capsule  Commonly known as: MONODOX  Take 1 capsule (100 mg total) by mouth every 12 (twelve) hours. for 9 days     ondansetron 4 MG Tbdl  Commonly known as: ZOFRAN-ODT  Take 1 tablet (4 mg total) by mouth every 12 (twelve) hours as needed (nausea).        CHANGE how you take these medications    LEVEMIR FLEXPEN 100 unit/mL (3 mL) Inpn pen  Generic drug: insulin detemir U-100 (Levemir)  Inject 17 Units into the skin 2 (two) times daily.  What changed: how much to take        CONTINUE taking these medications    insulin aspart U-100 100 unit/mL (3 mL) Inpn pen  Commonly known as: NovoLOG  To use with carb ratio 1:4, ISF: 20 MDD: 80 units        STOP taking these medications    blood sugar diagnostic Strp     doxycycline 100 MG Cap  Commonly known as: VIBRAMYCIN            Indwelling Lines/Drains at time of discharge:   Lines/Drains/Airways     None                 Time spent on the discharge of patient: 37 minutes         Frederic Francisco PA-C  Department of Hospital Medicine  [facility]/Surg

## 2023-09-21 ENCOUNTER — OFFICE VISIT (OUTPATIENT)
Dept: WOUND CARE | Facility: HOSPITAL | Age: 33
End: 2023-09-21
Attending: FAMILY MEDICINE
Payer: MEDICAID

## 2023-09-21 VITALS
HEART RATE: 95 BPM | RESPIRATION RATE: 18 BRPM | TEMPERATURE: 98 F | DIASTOLIC BLOOD PRESSURE: 76 MMHG | SYSTOLIC BLOOD PRESSURE: 96 MMHG

## 2023-09-21 DIAGNOSIS — L02.91 ABSCESS: ICD-10-CM

## 2023-09-21 DIAGNOSIS — L02.811 SCALP ABSCESS: Primary | ICD-10-CM

## 2023-09-21 PROCEDURE — 99499 NO LOS: ICD-10-PCS | Mod: ,,, | Performed by: FAMILY MEDICINE

## 2023-09-21 PROCEDURE — 11042 DBRDMT SUBQ TIS 1ST 20SQCM/<: CPT | Mod: ,,, | Performed by: FAMILY MEDICINE

## 2023-09-21 PROCEDURE — 11042 PR DEBRIDEMENT, SKIN, SUB-Q TISSUE,=<20 SQ CM: ICD-10-PCS | Mod: ,,, | Performed by: FAMILY MEDICINE

## 2023-09-21 PROCEDURE — 99213 OFFICE O/P EST LOW 20 MIN: CPT | Mod: 25,PN | Performed by: FAMILY MEDICINE

## 2023-09-21 PROCEDURE — 11042 DBRDMT SUBQ TIS 1ST 20SQCM/<: CPT | Mod: PN | Performed by: FAMILY MEDICINE

## 2023-09-21 PROCEDURE — 99499 UNLISTED E&M SERVICE: CPT | Mod: ,,, | Performed by: FAMILY MEDICINE

## 2023-09-28 ENCOUNTER — OFFICE VISIT (OUTPATIENT)
Dept: WOUND CARE | Facility: HOSPITAL | Age: 33
End: 2023-09-28
Attending: FAMILY MEDICINE
Payer: MEDICAID

## 2023-09-28 VITALS
TEMPERATURE: 98 F | HEART RATE: 87 BPM | DIASTOLIC BLOOD PRESSURE: 72 MMHG | SYSTOLIC BLOOD PRESSURE: 97 MMHG | RESPIRATION RATE: 18 BRPM

## 2023-09-28 DIAGNOSIS — L02.91 ABSCESS: Primary | ICD-10-CM

## 2023-09-28 DIAGNOSIS — L02.811 SCALP ABSCESS: ICD-10-CM

## 2023-09-28 PROCEDURE — 99499 NO LOS: ICD-10-PCS | Mod: ,,, | Performed by: FAMILY MEDICINE

## 2023-09-28 PROCEDURE — 11042 DBRDMT SUBQ TIS 1ST 20SQCM/<: CPT | Mod: PN | Performed by: FAMILY MEDICINE

## 2023-09-28 PROCEDURE — 11042 PR DEBRIDEMENT, SKIN, SUB-Q TISSUE,=<20 SQ CM: ICD-10-PCS | Mod: ,,, | Performed by: FAMILY MEDICINE

## 2023-09-28 PROCEDURE — 99499 UNLISTED E&M SERVICE: CPT | Mod: ,,, | Performed by: FAMILY MEDICINE

## 2023-09-28 PROCEDURE — 11042 DBRDMT SUBQ TIS 1ST 20SQCM/<: CPT | Mod: ,,, | Performed by: FAMILY MEDICINE

## 2023-09-28 NOTE — PROGRESS NOTES
Wound Care Progress Note    Subjective:       Patient ID: Adore Estes is a 32 y.o. female.    Chief Complaint: Non-healing Wound (scalp)    HPI  Pt seen in clinic for a hospital FU visit for a posterior scalp abscess. Pt had I and D done, and FU care at the clinic. Pt has no new complaints today.  Review of Systems   Skin:  Positive for wound.        Open wound posterior scalp   All other systems reviewed and are negative.      Objective:        Physical Exam  Vitals and nursing note reviewed. Exam conducted with a chaperone present.   Constitutional:       General: She is not in acute distress.     Appearance: Normal appearance.   Skin:     General: Skin is warm and dry.      Findings: Lesion present.      Comments: Posterior scalp open wound from abscess, see wound care assessment documentation in chart review scanned under the media tab   Neurological:      General: No focal deficit present.      Mental Status: She is alert and oriented to person, place, and time.   Psychiatric:         Mood and Affect: Mood normal.         Behavior: Behavior normal.         Thought Content: Thought content normal.         Judgment: Judgment normal.       Vitals:    09/21/23 0800   BP: 96/76   Pulse: 95   Resp: 18   Temp: 98.2 °F (36.8 °C)       Assessment:           ICD-10-CM ICD-9-CM   1. Scalp abscess  L02.811 682.8   2. Abscess  L02.91 682.9                Plan:                  Adore was seen today for non-healing wound.    Diagnoses and all orders for this visit:    Scalp abscess    Abscess      Much of the documentation for this visit was completed in the Wound Docs system. Please see the attached documentation for further details about this patient's care.

## 2023-09-28 NOTE — PROGRESS NOTES
Wound Care Progress Note    Subjective:       Patient ID: Adore Estes is a 32 y.o. female.    Chief Complaint: Non-healing Wound (scalp)    HPI  Pt seen in clinic for a FU visit for a posterior head abscess open wound s/p I and D. Wound is smaller, improving, pt has no new complaints today.  Review of Systems   Skin:  Positive for wound.        Posterior head open wound   All other systems reviewed and are negative.      Objective:        Physical Exam  Vitals and nursing note reviewed.   Constitutional:       General: She is not in acute distress.     Appearance: Normal appearance.   Skin:     General: Skin is warm and dry.      Findings: Lesion present.      Comments: Posterior scalp open wound s/p I and D of abscess, see wound care assessment documentation in chart review scanned under the media tab   Neurological:      Mental Status: She is alert.   Psychiatric:         Mood and Affect: Mood normal.         Judgment: Judgment normal.       Vitals:    09/28/23 0900   BP: 97/72   Pulse: 87   Resp: 18   Temp: 98.2 °F (36.8 °C)       Assessment:           ICD-10-CM ICD-9-CM   1. Abscess  L02.91 682.9   2. Scalp abscess  L02.811 682.8                Plan:                  Adore was seen today for non-healing wound.    Diagnoses and all orders for this visit:    Abscess    Scalp abscess    Much of the documentation for this visit was completed in the Wound Docs system. Please see the attached documentation for further details about this patient's care.

## 2023-10-05 ENCOUNTER — OFFICE VISIT (OUTPATIENT)
Dept: WOUND CARE | Facility: HOSPITAL | Age: 33
End: 2023-10-05
Attending: FAMILY MEDICINE
Payer: MEDICAID

## 2023-10-05 VITALS
HEART RATE: 76 BPM | DIASTOLIC BLOOD PRESSURE: 75 MMHG | TEMPERATURE: 98 F | RESPIRATION RATE: 16 BRPM | SYSTOLIC BLOOD PRESSURE: 120 MMHG

## 2023-10-05 DIAGNOSIS — L02.91 ABSCESS: Primary | ICD-10-CM

## 2023-10-05 DIAGNOSIS — L02.811 SCALP ABSCESS: ICD-10-CM

## 2023-10-05 PROCEDURE — 11042 DBRDMT SUBQ TIS 1ST 20SQCM/<: CPT | Mod: PN | Performed by: FAMILY MEDICINE

## 2023-10-05 PROCEDURE — 11042 DBRDMT SUBQ TIS 1ST 20SQCM/<: CPT | Mod: ,,, | Performed by: FAMILY MEDICINE

## 2023-10-05 PROCEDURE — 99499 UNLISTED E&M SERVICE: CPT | Mod: ,,, | Performed by: FAMILY MEDICINE

## 2023-10-05 PROCEDURE — 99499 NO LOS: ICD-10-PCS | Mod: ,,, | Performed by: FAMILY MEDICINE

## 2023-10-05 PROCEDURE — 11042 PR DEBRIDEMENT, SKIN, SUB-Q TISSUE,=<20 SQ CM: ICD-10-PCS | Mod: ,,, | Performed by: FAMILY MEDICINE

## 2023-10-05 NOTE — PROGRESS NOTES
Wound Care Progress Note    Subjective:       Patient ID: Adore Estes is a 32 y.o. female.    Chief Complaint: No chief complaint on file.    HPI  Pt seen in clinic for a FU visit for a posterior scalp open wound from an abscess post I and D. Wound is improved, smaller. No new complaints at this visit.  Review of Systems   Skin:  Positive for wound.        Posterior scalp open wound   All other systems reviewed and are negative.      Objective:        Physical Exam  Vitals and nursing note reviewed.   Constitutional:       General: She is not in acute distress.     Appearance: Normal appearance.   Skin:     General: Skin is warm and dry.      Findings: Lesion present.      Comments: Posterior scalp open wound open surgical wound, see wound care assessment documentation in chart review scanned under the media tab   Neurological:      General: No focal deficit present.      Mental Status: She is alert and oriented to person, place, and time.   Psychiatric:         Mood and Affect: Mood normal.         Behavior: Behavior normal.         Thought Content: Thought content normal.         Judgment: Judgment normal.       There were no vitals filed for this visit.    Assessment:           ICD-10-CM ICD-9-CM   1. Abscess  L02.91 682.9   2. Scalp abscess  L02.811 682.8                Plan:                  Diagnoses and all orders for this visit:    Abscess    Scalp abscess      See wound care instructions provided separately

## 2023-10-11 NOTE — OP NOTE
Surgery Date: 9/11/2023      Surgeon(s) and Role:     * Ashkan Adamson III, MD - Primary     Assisting Surgeon: None     Pre-op Diagnosis:  Scalp abscess [L02.811]     Post-op Diagnosis:  Post-Op Diagnosis Codes:     * Scalp abscess [L02.811]     Procedure(s) (LRB):  INCISION AND DRAINAGE, ABSCESS (N/A)     Anesthesia: General     Operative Findings: drainage of scalp abscess. Debridement of non viable skin at wound edge.     Patient brought to the operating room transferred to the supine position.  She was given general anesthesia and intubated.  She was placed laterally.  The abscess was located in the posterior scalp.  It was already opened.  There was nonviable skin at the edges of the open sinus.  The posterior scalp was prepped and draped.  The small rim of nonviable skin was debrided away from the wound edges.  The pus within the wound was evacuated.  Hemostasis obtained.  Wound was packed.  She was put back in supine position and extubated.  She was brought to recovery room stable condition.     Estimated Blood Loss: 50 mL   Instrument counts correct.  Complications none.  Estimated Blood Loss has been documented.         Specimens:   Specimen (24h ago, onward)        None                [id number]

## 2023-10-12 ENCOUNTER — OFFICE VISIT (OUTPATIENT)
Dept: WOUND CARE | Facility: HOSPITAL | Age: 33
End: 2023-10-12
Attending: FAMILY MEDICINE
Payer: MEDICAID

## 2023-10-12 VITALS
HEART RATE: 81 BPM | SYSTOLIC BLOOD PRESSURE: 121 MMHG | TEMPERATURE: 98 F | RESPIRATION RATE: 16 BRPM | DIASTOLIC BLOOD PRESSURE: 84 MMHG

## 2023-10-12 DIAGNOSIS — L02.811 SCALP ABSCESS: Primary | ICD-10-CM

## 2023-10-12 PROCEDURE — 3079F DIAST BP 80-89 MM HG: CPT | Mod: CPTII,,, | Performed by: FAMILY MEDICINE

## 2023-10-12 PROCEDURE — 99213 OFFICE O/P EST LOW 20 MIN: CPT | Mod: ,,, | Performed by: FAMILY MEDICINE

## 2023-10-12 PROCEDURE — 1111F PR DISCHARGE MEDS RECONCILED W/ CURRENT OUTPATIENT MED LIST: ICD-10-PCS | Mod: CPTII,,, | Performed by: FAMILY MEDICINE

## 2023-10-12 PROCEDURE — 99213 PR OFFICE/OUTPT VISIT, EST, LEVL III, 20-29 MIN: ICD-10-PCS | Mod: ,,, | Performed by: FAMILY MEDICINE

## 2023-10-12 PROCEDURE — 3046F PR MOST RECENT HEMOGLOBIN A1C LEVEL > 9.0%: ICD-10-PCS | Mod: CPTII,,, | Performed by: FAMILY MEDICINE

## 2023-10-12 PROCEDURE — 1159F PR MEDICATION LIST DOCUMENTED IN MEDICAL RECORD: ICD-10-PCS | Mod: CPTII,,, | Performed by: FAMILY MEDICINE

## 2023-10-12 PROCEDURE — 3074F SYST BP LT 130 MM HG: CPT | Mod: CPTII,,, | Performed by: FAMILY MEDICINE

## 2023-10-12 PROCEDURE — 3079F PR MOST RECENT DIASTOLIC BLOOD PRESSURE 80-89 MM HG: ICD-10-PCS | Mod: CPTII,,, | Performed by: FAMILY MEDICINE

## 2023-10-12 PROCEDURE — 3074F PR MOST RECENT SYSTOLIC BLOOD PRESSURE < 130 MM HG: ICD-10-PCS | Mod: CPTII,,, | Performed by: FAMILY MEDICINE

## 2023-10-12 PROCEDURE — 1111F DSCHRG MED/CURRENT MED MERGE: CPT | Mod: CPTII,,, | Performed by: FAMILY MEDICINE

## 2023-10-12 PROCEDURE — 97602 WOUND(S) CARE NON-SELECTIVE: CPT | Mod: PN | Performed by: FAMILY MEDICINE

## 2023-10-12 PROCEDURE — 3046F HEMOGLOBIN A1C LEVEL >9.0%: CPT | Mod: CPTII,,, | Performed by: FAMILY MEDICINE

## 2023-10-12 PROCEDURE — 1159F MED LIST DOCD IN RCRD: CPT | Mod: CPTII,,, | Performed by: FAMILY MEDICINE

## 2023-10-12 NOTE — PROGRESS NOTES
Wound Care Progress Note    Subjective:       Patient ID: Adore Estes is a 32 y.o. female.    Chief Complaint: No chief complaint on file.    HPI  Pt seen in clinic for a FU visit for a posterior head open wound from abscess. Wound continues to improve, pt has no new complaints today.   Review of Systems   Skin:  Positive for wound.        Posterior head open wound   All other systems reviewed and are negative.      Objective:        Physical Exam  Vitals and nursing note reviewed. Exam conducted with a chaperone present.   Constitutional:       General: She is not in acute distress.     Appearance: Normal appearance.   Skin:     General: Skin is warm and dry.      Findings: Lesion present.      Comments: Posterior head open wound from abscess, see wound care assessment documentation in chart review scanned under the media tab   Neurological:      General: No focal deficit present.      Mental Status: She is alert.   Psychiatric:         Mood and Affect: Mood normal.         Thought Content: Thought content normal.       There were no vitals filed for this visit.    Assessment:           ICD-10-CM ICD-9-CM   1. Scalp abscess  L02.811 682.8                Plan:                  Diagnoses and all orders for this visit:    Scalp abscess      See wound care instructions provided separately

## 2023-10-19 ENCOUNTER — OFFICE VISIT (OUTPATIENT)
Dept: WOUND CARE | Facility: HOSPITAL | Age: 33
End: 2023-10-19
Attending: FAMILY MEDICINE
Payer: MEDICAID

## 2023-10-19 VITALS
DIASTOLIC BLOOD PRESSURE: 74 MMHG | RESPIRATION RATE: 16 BRPM | SYSTOLIC BLOOD PRESSURE: 106 MMHG | TEMPERATURE: 98 F | HEART RATE: 78 BPM

## 2023-10-19 DIAGNOSIS — L02.91 ABSCESS: ICD-10-CM

## 2023-10-19 DIAGNOSIS — L02.811 SCALP ABSCESS: Primary | ICD-10-CM

## 2023-10-19 PROCEDURE — 3078F PR MOST RECENT DIASTOLIC BLOOD PRESSURE < 80 MM HG: ICD-10-PCS | Mod: CPTII,,, | Performed by: FAMILY MEDICINE

## 2023-10-19 PROCEDURE — 1159F PR MEDICATION LIST DOCUMENTED IN MEDICAL RECORD: ICD-10-PCS | Mod: CPTII,,, | Performed by: FAMILY MEDICINE

## 2023-10-19 PROCEDURE — 3046F PR MOST RECENT HEMOGLOBIN A1C LEVEL > 9.0%: ICD-10-PCS | Mod: CPTII,,, | Performed by: FAMILY MEDICINE

## 2023-10-19 PROCEDURE — 3046F HEMOGLOBIN A1C LEVEL >9.0%: CPT | Mod: CPTII,,, | Performed by: FAMILY MEDICINE

## 2023-10-19 PROCEDURE — 1160F RVW MEDS BY RX/DR IN RCRD: CPT | Mod: CPTII,,, | Performed by: FAMILY MEDICINE

## 2023-10-19 PROCEDURE — 3078F DIAST BP <80 MM HG: CPT | Mod: CPTII,,, | Performed by: FAMILY MEDICINE

## 2023-10-19 PROCEDURE — 99212 PR OFFICE/OUTPT VISIT, EST, LEVL II, 10-19 MIN: ICD-10-PCS | Mod: ,,, | Performed by: FAMILY MEDICINE

## 2023-10-19 PROCEDURE — 3074F SYST BP LT 130 MM HG: CPT | Mod: CPTII,,, | Performed by: FAMILY MEDICINE

## 2023-10-19 PROCEDURE — 3074F PR MOST RECENT SYSTOLIC BLOOD PRESSURE < 130 MM HG: ICD-10-PCS | Mod: CPTII,,, | Performed by: FAMILY MEDICINE

## 2023-10-19 PROCEDURE — 99213 OFFICE O/P EST LOW 20 MIN: CPT | Mod: PN | Performed by: FAMILY MEDICINE

## 2023-10-19 PROCEDURE — 1160F PR REVIEW ALL MEDS BY PRESCRIBER/CLIN PHARMACIST DOCUMENTED: ICD-10-PCS | Mod: CPTII,,, | Performed by: FAMILY MEDICINE

## 2023-10-19 PROCEDURE — 1159F MED LIST DOCD IN RCRD: CPT | Mod: CPTII,,, | Performed by: FAMILY MEDICINE

## 2023-10-19 PROCEDURE — 99212 OFFICE O/P EST SF 10 MIN: CPT | Mod: ,,, | Performed by: FAMILY MEDICINE

## 2023-10-19 NOTE — PROGRESS NOTES
Wound Care Progress Note    Subjective:       Patient ID: Adore Estes is a 32 y.o. female.    Chief Complaint: Wound Care and Wound Consult    HPI  Pt seen in clinic for a FU visit for a posterior scalp wound. Pt is healed. Pt has no new complaints today.    Review of Systems   Skin:  Positive for wound.        Posterior scalp healed wound   All other systems reviewed and are negative.      Objective:        Physical Exam  Vitals and nursing note reviewed. Exam conducted with a chaperone present.   Constitutional:       General: She is not in acute distress.     Appearance: Normal appearance.   Skin:     General: Skin is warm and dry.      Findings: Lesion present.      Comments: Posterior scalp healed wound, see wound care assessment documentation in  chart review scanned under the media tab   Neurological:      General: No focal deficit present.      Mental Status: She is alert.   Psychiatric:         Mood and Affect: Mood normal.         Thought Content: Thought content normal.         Judgment: Judgment normal.       Vitals:    10/19/23 0820   BP: 106/74   Pulse: 78   Resp: 16   Temp: 98.2 °F (36.8 °C)       Assessment:           ICD-10-CM ICD-9-CM   1. Scalp abscess  L02.811 682.8   2. Abscess  L02.91 682.9                Plan:                  Adore was seen today for wound care and wound consult.    Diagnoses and all orders for this visit:    Scalp abscess    Abscess      See wound care instructions which have been provided separately.

## 2023-12-25 PROBLEM — E10.10 DIABETIC KETOACIDOSIS WITHOUT COMA ASSOCIATED WITH TYPE 1 DIABETES MELLITUS: Status: RESOLVED | Noted: 2023-09-12 | Resolved: 2023-12-25

## 2023-12-28 ENCOUNTER — TELEPHONE (OUTPATIENT)
Dept: ENDOCRINOLOGY | Facility: CLINIC | Age: 33
End: 2023-12-28
Payer: MEDICAID

## 2024-01-03 ENCOUNTER — TELEPHONE (OUTPATIENT)
Dept: ENDOCRINOLOGY | Facility: CLINIC | Age: 34
End: 2024-01-03
Payer: MEDICAID

## 2024-01-03 NOTE — TELEPHONE ENCOUNTER
----- Message from Patricia Hernandez sent at 1/3/2024  2:13 PM CST -----  Contact: Patient  Type:  RX Refill Request    Who Called:   Patient  Refill or New Rx:  New    RX Name and Strength:  insulin detemir U-100, Levemir, (LEVEMIR FLEXPEN) 100 unit/mL (3 mL) InPn pen     Preferred Pharmacy with phone number:      [id number] [place] [address] [facility] & [address] [address]  Phone: [phone] Fax: [phone]    Local or Mail Order:  Local  Ordering Provider:  Hilda Parsons    Would the patient rather a call back or a response via [facility]?   Call back  Best Call Back Number:  [phone]    Additional Information:   States her dosage has been increased by her PCP and needs new prescription for increased dosage - please call - thank you

## 2024-01-28 NOTE — PROGRESS NOTES
Chief Complaint   Patient presents with    [facility]    Well Woman    Contraception    has severe cramping prior and during period       History and Physical:  Adore Estes is a 29 y.o. F who presents today for her routine annual GYN exam. The patient has no Gynecology complaints.       Patient's last menstrual period was 10/13/2020.  Contraception: none, desires to restart COCP  Multivitamin or Folic Acid: yes  Menarche: 13  Menstrual cycle: monthly, lasting 5d, using 2-3ppd, & severe dsymeorrhea  No medications taken  Last Pap: 10/2019  History of abnormal pap: none  STD testing: declines  Immunization status: stated as current, but no records available. HPV received.   Body mass index is 21.54 kg/m².    Allergies:   Review of patient's allergies indicates:   Allergen Reactions    Cerebyx [fosphenytoin] Itching    Amoxil [amoxicillin] Hives     Past Medical History:   Diagnosis Date    Diabetes mellitus     Insulin pump in place     Seizures      Past Surgical History:   Procedure Laterality Date    APPENDECTOMY       SECTION       MEDS:   Current Outpatient Medications on File Prior to Visit   Medication Sig Dispense Refill    insulin lispro 100 unit/mL injection To use with insulin pump. MDD: 100 units daily. 90 mL 3     No current facility-administered medications on file prior to visit.      OB History        3    Para   1    Term           1    AB   2    Living   1       SAB   2    TAB        Ectopic        Multiple        Live Births                   Social History     Socioeconomic History    Marital status: Single     Spouse name: Not on file    Number of children: Not on file    Years of education: Not on file    Highest education level: Not on file   Occupational History    Not on file   Social Needs    Financial resource strain: Not on file    Food insecurity     Worry: Not on file     Inability: Not on file    Transportation needs     Medical: Not on  "file     Non-medical: Not on file   Tobacco Use    Smoking status: Never Smoker   Substance and Sexual Activity    Alcohol use: No    Drug use: No    Sexual activity: Yes     Birth control/protection: None   Lifestyle    Physical activity     Days per week: Not on file     Minutes per session: Not on file    Stress: Not on file   Relationships    Social connections     Talks on phone: Not on file     Gets together: Not on file     Attends [employer] service: Not on file     Active member of club or organization: Not on file     Attends meetings of clubs or organizations: Not on file     Relationship status: Not on file   Other Topics Concern    Not on file   Social History Narrative    Not on file     Family History   Problem Relation Age of Onset    Breast cancer Maternal Aunt     Colon cancer Neg Hx     Ovarian cancer Neg Hx        Past medical and surgical history reviewed.   I have reviewed the patient's medical history in detail and updated the computerized patient record.    Review of System:   General: no chills, fever, night sweats, weight gain or weight loss  Breasts: no new or changing breast lumps, nipple discharge or masses.  Gastrointestinal: no abdominal pain, change in bowel habits, or black or bloody stools  Genito-Urinary: no incontinence, urinary frequency/urgency or vulvar/vaginal symptoms, pelvic pain or abnormal vaginal bleeding.    Physical Exam:   /70   Ht 5' 10" (1.778 m)   Wt 68.1 kg (150 lb 2.1 oz)   LMP 10/13/2020   BMI 21.54 kg/m²   Constitutional: She appears alert and responsive. She appears well-developed, well-groomed, and well-nourished. No distress.  Breasts: are symmetrical.  Right breast exhibits no inverted nipple, no mass, no nipple discharge, no skin change and no tenderness.  Left breast exhibits no inverted nipple, no mass, no nipple discharge, no skin change and no tenderness.  Abdominal: Soft. She exhibits no distension, hernias or masses. There is no " tenderness. No enlargement of liver edge or spleen.  There is no rebound and no guarding.   Genitourinary:    External rectal exam shows no thrombosed external hemorrhoids, no lesions.     Pelvic exam was performed with patient supine.   No labial fusion, and symmetrical.    There is no rash, lesion or injury on the right labia.   There is no rash, lesion or injury on the left labia.   No bleeding and no signs of injury around the vaginal introitus, urethral meatus is normal size and without prolapse or lesions, urethra well supported. The cervix is visualized with no discharge, lesions or friability.   No vaginal discharge found.    No significant Cystocele, Enterocele or rectocele, and cervix and uterus well supported.   Bimanual exam:   The urethra is normal to palpation and there are no palpable vaginal wall masses.   Uterus is not deviated, not enlarged, not fixed, normal shape and not tender.   Cervix exhibits no motion tenderness.    Right adnexum displays no mass or nodularity and no tenderness.   Left adnexum displays no mass or nodularity and no tenderness.    Assessment/Plan:   Encounter for gynecological examination with abnormal finding    Screening for malignant neoplasm of cervix  -     Liquid-Based Pap Smear, Screening  -     HPV High Risk Genotypes, PCR    Dysmenorrhea  -     Discontinue: norgestimate-ethinyl estradioL (ORTHO-CYCLEN) 0.25-35 mg-mcg per tablet; Take 1 tablet by mouth once daily.  Dispense: 30 tablet; Refill: 11  -     drospirenone, contraceptive, (SLYND) 4 mg (28) Tab; Take 4 mg by mouth once daily.  Dispense: 28 tablet; Refill: 11    Start NSAIDS for pain. Ibuprofen 800mg initally for severe pain. Then 400-800mg Ibuprofen every eight hours as needed. Start medication two days before your cycle and for the first few days of your cycle. Will also restart POP. Will avoid CHC or Depo Provera due to h/o DM >20years, uncontrolled. Patient declined LARCs.     Follow up in 1 year.       5

## 2024-02-29 NOTE — PROGRESS NOTES
CC: This 33 y.o. female presents for management of diabetes  and chronic conditions pending review including h/o pancreatitis     HPI: She was diagnosed with T2DM in 2/6/1996 after experiencing polyuria and polydipsia. Admitted on 12/17 for DKA and switched from Medtronic 530 G to MDI.  Has been hospitalized for DKA multiple times. Does report seizures in the past after having hypoglycemia. She had DKA in 2021 and  11/22.  Family hx of DM: mom's 1/2 sister   Fhx of thyroid disease: parents    Arrives new to me today, Last seen by ELINOR Boyer in 7/2023     hypoglycemia at home  monitoring BG at home:  Fasting - 255 this am often upper 200s  Dinner: 220s    Diet: Eats 2  Meals a day, snacks- dried fruit, nuts   Exercise: dance class twice a week for 3 hrs , gym   CURRENT DM MEDS: levemir 17 u bid (her prescribed dose is 25 u bid, taking less bc almost out), Novolog w carb ratio 4, ISF 25  Timing prandial insulin 5-15 minutes before meals: yes  Vial/pen:  Uses pens  Glucometer type:  True metrix     Standards of Care:  Eye exam: 10/2023  [employer]     Works as an      ROS:   Gen: Appetite good   Eyes: Denies visual disturbances  Resp: no SOB or CHAN   Cardiac: No palpitations, chest pain    GI: No nausea or vomiting, diarrhea, constipation  /GYN: 1+ nocturia, burning or pain.   MS/Neuro: Denies numbness/ tingling in BLE; Gait steady, speech clear  Psych: Denies drug/ETOH abuse,+ hx of post partum depression.  Other systems: negative.    PE:  GENERAL: Well developed, well nourished.  PSYCH: AAOx3, appropriate mood and affect, pleasant expression, conversant, appears relaxed, well groomed.   EYES: Conjunctiva, corneas clear  NECK: Supple, trachea midline,   NEURO: Gait steady  SKIN:   no acanthosis nigracans.  FOOT EXAMINATION: 3/4/2024  No foot deformity, corns or callus formation,   nails in good condition and well trimmed, no interspace maceration or ulceration noted.  Decreased hair growth  "present over toes/feet.   Protective sensation intact with 10 gram monofilament.  +2 dorsalis pedis and posterior pulses noted.     Personally reviewed Past Medical, Surgical, Social History.    /74 (BP Location: Left arm, Patient Position: Sitting, BP Method: Small (Manual))   Pulse 93   Ht 5' 10" (1.778 m)   Wt 68.1 kg (150 lb 0.4 oz)   SpO2 99%   BMI 21.53 kg/m²      Personally reviewed the below labs:      Chemistry        Component Value Date/Time     09/14/2023 0314    K 3.5 09/14/2023 0314     09/14/2023 0314    CO2 26 09/14/2023 0314    BUN 7 09/14/2023 0314    CREATININE 0.6 09/14/2023 0314    CREATININE 0.7 08/19/2013 0940     (H) 09/14/2023 0314        Component Value Date/Time    CALCIUM 8.5 (L) 09/14/2023 0314    CALCIUM 9.1 08/19/2013 0940    ALKPHOS 147 (H) 09/10/2023 2019    ALKPHOS 64 08/19/2013 0940    AST 9 (L) 09/10/2023 2019    AST 13 08/19/2013 0940    ALT 9 (L) 09/10/2023 2019    BILITOT 0.2 09/10/2023 2019    ESTGFRAFRICA >60 12/18/2021 0316    EGFRNONAA >60 12/18/2021 0316            Lab Results   Component Value Date    TSH 0.699 11/18/2022       Recent Labs   Lab 11/19/22  0434   LDL Cholesterol 123.2   HDL 47   Cholesterol 203 H        Results for orders placed or performed in visit on 11/30/21   Vitamin D   Result Value Ref Range    Vit D, 25-Hydroxy 28 (L) 30 - 96 ng/mL     No results found for this or any previous visit.    No results found for: "MICALBCREAT"    Hemoglobin A1C   Date Value Ref Range Status   07/31/2023 13.5 (H) 4.0 - 5.6 % Final     Comment:     ADA Screening Guidelines:  5.7-6.4%  Consistent with prediabetes  >or=6.5%  Consistent with diabetes    High levels of fetal hemoglobin interfere with the HbA1C  assay. Heterozygous hemoglobin variants (HbS, HgC, etc)do  not significantly interfere with this assay.   However, presence of multiple variants may affect accuracy.     11/18/2022 13.3 (H) 4.0 - 5.6 % Final     Comment:     ADA Screening " Guidelines:  5.7-6.4%  Consistent with prediabetes  >or=6.5%  Consistent with diabetes    High levels of fetal hemoglobin interfere with the HbA1C  assay. Heterozygous hemoglobin variants (HbS, HgC, etc)do  not significantly interfere with this assay.   However, presence of multiple variants may affect accuracy.     11/30/2021 14.0 (H) 4.0 - 5.6 % Final     Comment:     ADA Screening Guidelines:  5.7-6.4%  Consistent with prediabetes  >or=6.5%  Consistent with diabetes    High levels of fetal hemoglobin interfere with the HbA1C  assay. Heterozygous hemoglobin variants (HbS, HgC, etc)do  not significantly interfere with this assay.   However, presence of multiple variants may affect accuracy.          ASSESSMENT and PLAN:      1. T1DM with hyperglycemia-    A1C today  Change levemir to lantus- her prescribed dose is 25 u bid- she scarltet resume this dose   Continue Novolog carb ratio 4, ISF 25   RX for Continuous Glucose Monitor   -Recommend Dexcom  Continuous Glucose monitor                          Pt would benefit from therapeutic continuous glucose monitor to be able                         to make frequent insulin adjustments, based on current glucoses.     -letter for travel provided      Follow-up: in 3 months with A1C, cmp, lipid, [facility]

## 2024-03-01 ENCOUNTER — OFFICE VISIT (OUTPATIENT)
Dept: URGENT CARE | Facility: CLINIC | Age: 34
End: 2024-03-01
Payer: MEDICAID

## 2024-03-01 VITALS
TEMPERATURE: 98 F | DIASTOLIC BLOOD PRESSURE: 70 MMHG | RESPIRATION RATE: 18 BRPM | SYSTOLIC BLOOD PRESSURE: 111 MMHG | WEIGHT: 145 LBS | OXYGEN SATURATION: 97 % | HEART RATE: 109 BPM | HEIGHT: 69 IN | BODY MASS INDEX: 21.48 KG/M2

## 2024-03-01 DIAGNOSIS — R09.81 NASAL CONGESTION: ICD-10-CM

## 2024-03-01 DIAGNOSIS — J22 LOWER RESPIRATORY INFECTION: Primary | ICD-10-CM

## 2024-03-01 DIAGNOSIS — R05.9 COUGH, UNSPECIFIED TYPE: ICD-10-CM

## 2024-03-01 LAB
CTP QC/QA: YES
CTP QC/QA: YES
FLUAV AG NPH QL: NEGATIVE
FLUBV AG NPH QL: NEGATIVE
SARS-COV-2 AG RESP QL IA.RAPID: NEGATIVE

## 2024-03-01 PROCEDURE — 87804 INFLUENZA ASSAY W/OPTIC: CPT | Mod: QW,,,

## 2024-03-01 PROCEDURE — 99214 OFFICE O/P EST MOD 30 MIN: CPT | Mod: S$GLB,,,

## 2024-03-01 PROCEDURE — 87811 SARS-COV-2 COVID19 W/OPTIC: CPT | Mod: QW,S$GLB,,

## 2024-03-01 RX ORDER — AZITHROMYCIN 500 MG/1
500 TABLET, FILM COATED ORAL DAILY
Qty: 3 TABLET | Refills: 0 | Status: SHIPPED | OUTPATIENT
Start: 2024-03-01 | End: 2024-03-04

## 2024-03-01 RX ORDER — BENZONATATE 100 MG/1
100 CAPSULE ORAL 3 TIMES DAILY PRN
Qty: 30 CAPSULE | Refills: 0 | Status: SHIPPED | OUTPATIENT
Start: 2024-03-01 | End: 2024-03-04

## 2024-03-01 NOTE — PROGRESS NOTES
"Subjective:      Patient ID: Adore Estes is a 33 y.o. female.    Vitals:  height is 5' 9" (1.753 m) and weight is 65.8 kg (145 lb). Her temperature is 98.3 °F (36.8 °C). Her blood pressure is 111/70 and her pulse is 109. Her respiration is 18 and oxygen saturation is 97%.     Chief Complaint: Cough    Cough  This is a new problem. The current episode started in the past 7 days. The cough is Productive of blood-tinged sputum. Associated symptoms include chills, headaches, myalgias, nasal congestion, postnasal drip, rhinorrhea and a sore throat. Pertinent negatives include no fever, shortness of breath or wheezing. She has tried nothing for the symptoms.       Constitution: Positive for chills and fatigue. Negative for fever.   HENT:  Positive for congestion, postnasal drip, sore throat and voice change.    Neck: neck negative.   Cardiovascular: Negative.    Respiratory:  Positive for cough. Negative for sputum production, shortness of breath and wheezing.    Musculoskeletal:  Positive for muscle ache.   Neurological:  Positive for headaches.   Psychiatric/Behavioral: Negative.        Objective:     Physical Exam   Constitutional: She is oriented to person, place, and time. She appears well-developed. She is cooperative.  Non-toxic appearance. She does not appear ill. No distress.   HENT:   Head: Normocephalic and atraumatic.   Ears:   Right Ear: Hearing and external ear normal.   Left Ear: Hearing and external ear normal.   Nose: Rhinorrhea and congestion present. No mucosal edema or nasal deformity. No epistaxis. Right sinus exhibits no maxillary sinus tenderness and no frontal sinus tenderness. Left sinus exhibits no maxillary sinus tenderness and no frontal sinus tenderness.   Mouth/Throat: Uvula is midline and mucous membranes are normal. Mucous membranes are moist. No trismus in the jaw. Normal dentition. No uvula swelling. Posterior oropharyngeal erythema present. No oropharyngeal exudate or posterior " oropharyngeal edema.   Eyes: Conjunctivae and lids are normal. No scleral icterus.   Neck: Trachea normal and phonation normal. Neck supple. No edema present. No erythema present. No neck rigidity present.   Cardiovascular: Normal rate, regular rhythm and normal heart sounds.   Pulmonary/Chest: Effort normal and breath sounds normal. No respiratory distress. She has no decreased breath sounds. She has no wheezes. She has no rhonchi. She has no rales.   Abdominal: Normal appearance.   Musculoskeletal: Normal range of motion.         General: No deformity. Normal range of motion.   Neurological: She is alert and oriented to person, place, and time. She displays no weakness. She exhibits normal muscle tone.   Skin: Skin is warm, dry, intact, not diaphoretic and not pale.   Psychiatric: Her speech is normal and behavior is normal. Mood, judgment and thought content normal.   Nursing note and vitals reviewed.      Assessment:     1. Lower respiratory infection    2. Cough, unspecified type    3. Nasal congestion        Plan:       Lower respiratory infection  -     azithromycin (ZITHROMAX) 500 MG tablet; Take 1 tablet (500 mg total) by mouth once daily. for 3 days  Dispense: 3 tablet; Refill: 0    Cough, unspecified type  -     SARS Coronavirus 2 Antigen, POCT Manual Read  -     POCT Influenza A/B Rapid Antigen  -     benzonatate (TESSALON) 100 MG capsule; Take 1 capsule (100 mg total) by mouth 3 (three) times daily as needed for Cough.  Dispense: 30 capsule; Refill: 0    Nasal congestion      COVID: neg  FLU: neg    Discussed medication with patient who acknowledges understanding and is agreeable to POC. Follow up with primary care. Increase fluid intake. Red flags for ER discussed.

## 2024-03-04 ENCOUNTER — OFFICE VISIT (OUTPATIENT)
Dept: ENDOCRINOLOGY | Facility: CLINIC | Age: 34
End: 2024-03-04
Payer: MEDICAID

## 2024-03-04 ENCOUNTER — LAB VISIT (OUTPATIENT)
Dept: LAB | Facility: HOSPITAL | Age: 34
End: 2024-03-04
Attending: NURSE PRACTITIONER
Payer: MEDICAID

## 2024-03-04 VITALS
BODY MASS INDEX: 21.47 KG/M2 | HEART RATE: 93 BPM | SYSTOLIC BLOOD PRESSURE: 110 MMHG | HEIGHT: 70 IN | WEIGHT: 150 LBS | DIASTOLIC BLOOD PRESSURE: 74 MMHG | OXYGEN SATURATION: 99 %

## 2024-03-04 DIAGNOSIS — E10.9 TYPE 1 DIABETES MELLITUS WITHOUT COMPLICATION: ICD-10-CM

## 2024-03-04 DIAGNOSIS — E10.65 TYPE 1 DIABETES MELLITUS WITH HYPERGLYCEMIA: Primary | ICD-10-CM

## 2024-03-04 DIAGNOSIS — E10.65 TYPE 1 DIABETES MELLITUS WITH HYPERGLYCEMIA: ICD-10-CM

## 2024-03-04 LAB
ESTIMATED AVG GLUCOSE: 332 MG/DL (ref 68–131)
HBA1C MFR BLD: 13.2 % (ref 4–5.6)

## 2024-03-04 PROCEDURE — 99214 OFFICE O/P EST MOD 30 MIN: CPT | Mod: S$PBB,,, | Performed by: NURSE PRACTITIONER

## 2024-03-04 PROCEDURE — 1159F MED LIST DOCD IN RCRD: CPT | Mod: CPTII,,, | Performed by: NURSE PRACTITIONER

## 2024-03-04 PROCEDURE — 83036 HEMOGLOBIN GLYCOSYLATED A1C: CPT | Performed by: NURSE PRACTITIONER

## 2024-03-04 PROCEDURE — 3078F DIAST BP <80 MM HG: CPT | Mod: CPTII,,, | Performed by: NURSE PRACTITIONER

## 2024-03-04 PROCEDURE — 3008F BODY MASS INDEX DOCD: CPT | Mod: CPTII,,, | Performed by: NURSE PRACTITIONER

## 2024-03-04 PROCEDURE — 3074F SYST BP LT 130 MM HG: CPT | Mod: CPTII,,, | Performed by: NURSE PRACTITIONER

## 2024-03-04 PROCEDURE — 99213 OFFICE O/P EST LOW 20 MIN: CPT | Mod: PBBFAC,PO | Performed by: NURSE PRACTITIONER

## 2024-03-04 PROCEDURE — 99999 PR PBB SHADOW E&M-EST. PATIENT-LVL III: CPT | Mod: PBBFAC,,, | Performed by: NURSE PRACTITIONER

## 2024-03-04 PROCEDURE — 36415 COLL VENOUS BLD VENIPUNCTURE: CPT | Mod: PO | Performed by: NURSE PRACTITIONER

## 2024-03-04 RX ORDER — INSULIN ASPART 100 [IU]/ML
INJECTION, SOLUTION INTRAVENOUS; SUBCUTANEOUS
Qty: 30 ML | Refills: 11 | Status: SHIPPED | OUTPATIENT
Start: 2024-03-04

## 2024-03-04 RX ORDER — INSULIN GLARGINE 100 [IU]/ML
INJECTION, SOLUTION SUBCUTANEOUS
Qty: 15 ML | Refills: 11 | Status: SHIPPED | OUTPATIENT
Start: 2024-03-04

## 2024-03-04 RX ORDER — BLOOD-GLUCOSE SENSOR
EACH MISCELLANEOUS
Qty: 9 EACH | Refills: 4 | Status: SHIPPED | OUTPATIENT
Start: 2024-03-04

## 2024-06-18 ENCOUNTER — PATIENT MESSAGE (OUTPATIENT)
Dept: ENDOCRINOLOGY | Facility: CLINIC | Age: 34
End: 2024-06-18
Payer: MEDICAID

## 2024-09-26 ENCOUNTER — HOSPITAL ENCOUNTER (EMERGENCY)
Facility: HOSPITAL | Age: 34
Discharge: HOME | End: 2024-09-27
Attending: EMERGENCY MEDICINE
Payer: MEDICAID

## 2024-09-26 DIAGNOSIS — L03.811 ABSCESS OR CELLULITIS OF SCALP: ICD-10-CM

## 2024-09-26 DIAGNOSIS — L03.90 CELLULITIS, UNSPECIFIED CELLULITIS SITE: Primary | ICD-10-CM

## 2024-09-26 LAB
B-HCG UR QL: NEGATIVE
CTP QC/QA: YES
POCT GLUCOSE: 346 MG/DL (ref 70–110)

## 2024-09-26 PROCEDURE — 82962 GLUCOSE BLOOD TEST: CPT

## 2024-09-26 PROCEDURE — 99284 EMERGENCY DEPT VISIT MOD MDM: CPT | Mod: 25

## 2024-09-26 PROCEDURE — 81025 URINE PREGNANCY TEST: CPT | Performed by: PHYSICIAN ASSISTANT

## 2024-09-26 RX ORDER — LIDOCAINE HYDROCHLORIDE 10 MG/ML
5 INJECTION, SOLUTION EPIDURAL; INFILTRATION; INTRACAUDAL; PERINEURAL
Status: DISCONTINUED | OUTPATIENT
Start: 2024-09-26 | End: 2024-09-27 | Stop reason: HOSPADM

## 2024-09-27 VITALS
HEIGHT: 70 IN | BODY MASS INDEX: 21.47 KG/M2 | OXYGEN SATURATION: 98 % | SYSTOLIC BLOOD PRESSURE: 132 MMHG | WEIGHT: 150 LBS | RESPIRATION RATE: 18 BRPM | TEMPERATURE: 98 F | HEART RATE: 99 BPM | DIASTOLIC BLOOD PRESSURE: 83 MMHG

## 2024-09-27 LAB
ALBUMIN SERPL BCP-MCNC: 4 G/DL (ref 3.5–5.2)
ALP SERPL-CCNC: 70 U/L (ref 55–135)
ALT SERPL W/O P-5'-P-CCNC: 8 U/L (ref 10–44)
ANION GAP SERPL CALC-SCNC: 11 MMOL/L (ref 8–16)
AST SERPL-CCNC: 9 U/L (ref 10–40)
BASOPHILS # BLD AUTO: 0.04 K/UL (ref 0–0.2)
BASOPHILS NFR BLD: 0.3 % (ref 0–1.9)
BILIRUB SERPL-MCNC: 0.5 MG/DL (ref 0.1–1)
BUN SERPL-MCNC: 9 MG/DL (ref 6–20)
CALCIUM SERPL-MCNC: 8.9 MG/DL (ref 8.7–10.5)
CHLORIDE SERPL-SCNC: 100 MMOL/L (ref 95–110)
CO2 SERPL-SCNC: 21 MMOL/L (ref 23–29)
CREAT SERPL-MCNC: 0.5 MG/DL (ref 0.5–1.4)
DIFFERENTIAL METHOD BLD: ABNORMAL
EOSINOPHIL # BLD AUTO: 0.1 K/UL (ref 0–0.5)
EOSINOPHIL NFR BLD: 1 % (ref 0–8)
ERYTHROCYTE [DISTWIDTH] IN BLOOD BY AUTOMATED COUNT: 12.5 % (ref 11.5–14.5)
EST. GFR  (NO RACE VARIABLE): >60 ML/MIN/1.73 M^2
GLUCOSE SERPL-MCNC: 263 MG/DL (ref 70–110)
HCT VFR BLD AUTO: 41 % (ref 37–48.5)
HGB BLD-MCNC: 13.9 G/DL (ref 12–16)
IMM GRANULOCYTES # BLD AUTO: 0.04 K/UL (ref 0–0.04)
IMM GRANULOCYTES NFR BLD AUTO: 0.3 % (ref 0–0.5)
LDH SERPL L TO P-CCNC: 0.71 MMOL/L (ref 0.5–2.2)
LYMPHOCYTES # BLD AUTO: 1.8 K/UL (ref 1–4.8)
LYMPHOCYTES NFR BLD: 12.4 % (ref 18–48)
MCH RBC QN AUTO: 28 PG (ref 27–31)
MCHC RBC AUTO-ENTMCNC: 33.9 G/DL (ref 32–36)
MCV RBC AUTO: 83 FL (ref 82–98)
MONOCYTES # BLD AUTO: 0.9 K/UL (ref 0.3–1)
MONOCYTES NFR BLD: 6 % (ref 4–15)
NEUTROPHILS # BLD AUTO: 11.3 K/UL (ref 1.8–7.7)
NEUTROPHILS NFR BLD: 80 % (ref 38–73)
NRBC BLD-RTO: 0 /100 WBC
PLATELET # BLD AUTO: 273 K/UL (ref 150–450)
PMV BLD AUTO: 9.5 FL (ref 9.2–12.9)
POTASSIUM SERPL-SCNC: 3.8 MMOL/L (ref 3.5–5.1)
PROT SERPL-MCNC: 7 G/DL (ref 6–8.4)
RBC # BLD AUTO: 4.96 M/UL (ref 4–5.4)
SAMPLE: NORMAL
SODIUM SERPL-SCNC: 132 MMOL/L (ref 136–145)
WBC # BLD AUTO: 14.15 K/UL (ref 3.9–12.7)

## 2024-09-27 PROCEDURE — 96361 HYDRATE IV INFUSION ADD-ON: CPT

## 2024-09-27 PROCEDURE — 85025 COMPLETE CBC W/AUTO DIFF WBC: CPT | Performed by: EMERGENCY MEDICINE

## 2024-09-27 PROCEDURE — 80053 COMPREHEN METABOLIC PANEL: CPT | Performed by: EMERGENCY MEDICINE

## 2024-09-27 PROCEDURE — 63600175 PHARM REV CODE 636 W HCPCS: Mod: JZ,JG | Performed by: EMERGENCY MEDICINE

## 2024-09-27 PROCEDURE — 96365 THER/PROPH/DIAG IV INF INIT: CPT

## 2024-09-27 PROCEDURE — 36415 COLL VENOUS BLD VENIPUNCTURE: CPT | Performed by: EMERGENCY MEDICINE

## 2024-09-27 PROCEDURE — 25000003 PHARM REV CODE 250: Performed by: EMERGENCY MEDICINE

## 2024-09-27 PROCEDURE — 87040 BLOOD CULTURE FOR BACTERIA: CPT | Mod: 59 | Performed by: EMERGENCY MEDICINE

## 2024-09-27 RX ORDER — MUPIROCIN 20 MG/G
OINTMENT TOPICAL
Status: COMPLETED | OUTPATIENT
Start: 2024-09-27 | End: 2024-09-27

## 2024-09-27 RX ORDER — MUPIROCIN 20 MG/G
OINTMENT TOPICAL 3 TIMES DAILY
Qty: 22 G | Refills: 1 | Status: SHIPPED | OUTPATIENT
Start: 2024-09-27 | End: 2024-10-04

## 2024-09-27 RX ADMIN — DALBAVANCIN 1500 MG: 500 INJECTION, POWDER, FOR SOLUTION INTRAVENOUS at 02:09

## 2024-09-27 RX ADMIN — SODIUM CHLORIDE 2040 ML: 9 INJECTION, SOLUTION INTRAVENOUS at 01:09

## 2024-09-27 RX ADMIN — MUPIROCIN 1 G: 20 OINTMENT TOPICAL at 01:09

## 2024-09-27 NOTE — ED PROVIDER NOTES
Encounter Date: 2024       History     Chief Complaint   Patient presents with    Abscess     Noted to have abscess behind right ear in the hair line.  States noted to have on Tuesday, not improving.  Noted for Type 1 diabetic.       33-year-old female presented emergency department with a wound behind the right ear on the scalp.  Patient is a type 1 diabetic and wound is not getting better so presented here. .  Patient has this wound which now is open and is draining pus spontaneously.  Denies fever or chills or chest pain or shortness of breath or dysuria or hematuria weakness or numbness.      Review of patient's allergies indicates:   Allergen Reactions    Cerebyx [fosphenytoin] Itching    Amoxicillin Hives     Past Medical History:   Diagnosis Date    Diabetes mellitus     Diabetes mellitus type I     Diabetes mellitus, type 2     Insulin pump in place     Seizures     last seizure       Past Surgical History:   Procedure Laterality Date    APPENDECTOMY  2017     SECTION      INCISION AND DRAINAGE OF ABSCESS N/A 2023    Procedure: INCISION AND DRAINAGE, ABSCESS;  Surgeon: Ashkan Adamson III, MD;  Location: [facility] OR;  Service: General;  Laterality: N/A;  posterior scalp     Family History   Problem Relation Name Age of Onset    Asthma Sister x2     Ovarian cancer Sister x2     Breast cancer Maternal Aunt      Colon cancer Neg Hx       Social History     Tobacco Use    Smoking status: Never    Smokeless tobacco: Never   Substance Use Topics    Alcohol use: Yes     Alcohol/week: 3.0 standard drinks of alcohol     Types: 1 Glasses of wine, 1 Cans of beer, 1 Shots of liquor per week     Comment: 2 drink per week     Drug use: No     Review of Systems   Constitutional: Negative.    HENT: Negative.     Eyes: Negative.    Respiratory: Negative.     Cardiovascular: Negative.  Negative for chest pain.   Gastrointestinal: Negative.    Endocrine: Negative.    Genitourinary: Negative.     Musculoskeletal: Negative.    Skin:  Positive for wound.   Allergic/Immunologic: Negative.    Neurological: Negative.    Hematological: Negative.    Psychiatric/Behavioral: Negative.     All other systems reviewed and are negative.      Physical Exam     Initial Vitals [09/26/24 2149]   BP Pulse Resp Temp SpO2   126/81 109 18 98.1 °F (36.7 °C) 97 %      MAP       --         Physical Exam    Nursing note and vitals reviewed.  Constitutional: She appears well-developed and well-nourished.   HENT:   Head: Normocephalic and atraumatic.   Nose: Nose normal.   Mouth/Throat: Oropharynx is clear and moist.   Eyes: Conjunctivae and EOM are normal. Pupils are equal, round, and reactive to light.   Neck: Neck supple. No thyromegaly present. No tracheal deviation present. No JVD present.   Normal range of motion.  Cardiovascular:  Normal rate, regular rhythm, normal heart sounds and intact distal pulses.           No murmur heard.  Pulmonary/Chest: Breath sounds normal. No stridor. No respiratory distress. She has no wheezes. She has no rales.   Abdominal: Abdomen is soft. Bowel sounds are normal.   Musculoskeletal:         General: No edema. Normal range of motion.      Cervical back: Normal range of motion and neck supple.     Neurological: She is alert and oriented to person, place, and time.   Skin: Skin is warm. Capillary refill takes less than 2 seconds. Abscess noted. There is erythema.   Wound in the right occipital scalp which likely started as folliculitis and then turn into abscess and now is spontaneously draining.  There is also some surrounding cellulitis in that area.  There is no fluctuance noted.   Psychiatric: She has a normal mood and affect. Thought content normal.         ED Course   Procedures  Labs Reviewed   CBC W/ AUTO DIFFERENTIAL - Abnormal       Result Value    WBC 14.15 (*)     RBC 4.96      Hemoglobin 13.9      Hematocrit 41.0      MCV 83      MCH 28.0      MCHC 33.9      RDW 12.5      Platelets  273      MPV 9.5      Immature Granulocytes 0.3      Gran # (ANC) 11.3 (*)     Immature Grans (Abs) 0.04      Lymph # 1.8      Mono # 0.9      Eos # 0.1      Baso # 0.04      nRBC 0      Gran % 80.0 (*)     Lymph % 12.4 (*)     Mono % 6.0      Eosinophil % 1.0      Basophil % 0.3      Differential Method Automated     COMPREHENSIVE METABOLIC PANEL - Abnormal    Sodium 132 (*)     Potassium 3.8      Chloride 100      CO2 21 (*)     Glucose 263 (*)     BUN 9      Creatinine 0.5      Calcium 8.9      Total Protein 7.0      Albumin 4.0      Total Bilirubin 0.5      Alkaline Phosphatase 70      AST 9 (*)     ALT 8 (*)     eGFR >60.0      Anion Gap 11     POCT GLUCOSE - Abnormal    POCT Glucose 346 (*)    CULTURE, BLOOD   CULTURE, BLOOD   URINALYSIS, REFLEX TO URINE CULTURE   LACTIC ACID, PLASMA   POCT URINE PREGNANCY    POC Preg Test, Ur Negative       Acceptable Yes     ISTAT LACTATE    POC Lactate 0.71      Sample VENOUS     POCT GLUCOSE MONITORING CONTINUOUS   POCT LACTATE          Imaging Results    None          Medications   LIDOcaine (PF) 10 mg/ml (1%) injection 50 mg (has no administration in time range)   sodium chloride 0.9% bolus 2,040 mL 2,040 mL (2,040 mLs Intravenous New Bag 9/27/24 0109)   dalbavancin (DALVANCE) 1,500 mg in D5W 325 mL infusion (has no administration in time range)   mupirocin 2 % ointment (1 g Topical (Top) Given 9/27/24 0145)     Medical Decision Making  33-year-old female with the spontaneously draining abscess in the right occipital scalp.  Patient is diabetic and is hyperglycemic so blood sugar control and screening labs and workup reviewed.  Plan is to treat with antibiotics and as patient hemodynamically stable to discharge patient with instructions and follow-up with primary care.  Wound is spontaneously draining and I do not believe there is indication for further incision and drainage as it is already draining.  Advised warm compresses.    Amount and/or Complexity  of Data Reviewed  Labs: ordered.  Discussion of management or test interpretation with external provider(s): Patient has improvement of blood sugars and patient not acidotic.  Patient does have slight leukocytosis.  Treated with long-acting antibiotic and discharged with return precautions and instructions and follow-up with primary care provider.  Recommended wound check and 48 hours.    Risk  Prescription drug management.                                      Clinical Impression:  Final diagnoses:  [L03.90] Cellulitis, unspecified cellulitis site (Primary)  [L03.811] Abscess or cellulitis of scalp          ED Disposition Condition    Discharge Stable          ED Prescriptions       Medication Sig Dispense Start Date End Date Auth. Provider    mupirocin (BACTROBAN) 2 % ointment Apply topically 3 (three) times daily. for 7 days 22 g 9/27/2024 10/4/2024 Dora Chambers MD          Follow-up Information       Follow up With Specialties Details Why Contact Info    Toña Israel, FNP-C Family Medicine In 2 days  [address] [facility] [address]  [phone]               Dora Chambers MD  09/27/24 0154

## 2024-09-27 NOTE — DISCHARGE INSTRUCTIONS
Drink lots of fluids.  Rest.  Return to emergency department for worsening symptoms or any problems.  Follow-up with primary care provider in 2 days for wound recheck and return for any worsening symptoms.  Tylenol or ibuprofen for pain.

## 2024-09-27 NOTE — FIRST PROVIDER EVALUATION
Emergency Department TeleTriage Encounter Note      CHIEF COMPLAINT    Chief Complaint   Patient presents with    Abscess     Noted to have abscess behind right ear in the hair line.  States noted to have on Tuesday, not improving.  Noted for Type 1 diabetic.         VITAL SIGNS   Initial Vitals [09/26/24 2149]   BP Pulse Resp Temp SpO2   126/81 109 18 98.1 °F (36.7 °C) 97 %      MAP       --            ALLERGIES    Review of patient's allergies indicates:   Allergen Reactions    Cerebyx [fosphenytoin] Itching    Amoxicillin Hives       PROVIDER TRIAGE NOTE  Patient presents with abscess behind ear. She is type 1 diabetic and reports sugar has been running high.       ORDERS  Labs Reviewed - No data to display    ED Orders (720h ago, onward)      None              Virtual Visit Note: The provider triage portion of this emergency department evaluation and documentation was performed via [email or web address], a HIPAA-compliant telemedicine application, in concert with a tele-presenter in the room. A face to face patient evaluation with one of my colleagues will occur once the patient is placed in an emergency department room.      DISCLAIMER: This note was prepared with [employer] voice recognition transcription software. Garbled syntax, mangled pronouns, and other bizarre constructions may be attributed to that software system.

## 2024-10-02 LAB
BACTERIA BLD CULT: NORMAL
BACTERIA BLD CULT: NORMAL

## 2025-03-25 DIAGNOSIS — E10.65 TYPE 1 DIABETES MELLITUS WITH HYPERGLYCEMIA: ICD-10-CM

## 2025-03-25 RX ORDER — INSULIN GLARGINE 100 [IU]/ML
INJECTION, SOLUTION SUBCUTANEOUS
Qty: 15 ML | Refills: 0 | Status: SHIPPED | OUTPATIENT
Start: 2025-03-25

## 2025-03-25 RX ORDER — INSULIN ASPART 100 [IU]/ML
INJECTION, SOLUTION INTRAVENOUS; SUBCUTANEOUS
Qty: 30 ML | Refills: 0 | Status: SHIPPED | OUTPATIENT
Start: 2025-03-25

## 2025-04-01 ENCOUNTER — OFFICE VISIT (OUTPATIENT)
Dept: URGENT CARE | Facility: CLINIC | Age: 35
End: 2025-04-01
Payer: MEDICAID

## 2025-04-01 VITALS
TEMPERATURE: 98 F | SYSTOLIC BLOOD PRESSURE: 130 MMHG | HEIGHT: 70 IN | DIASTOLIC BLOOD PRESSURE: 85 MMHG | RESPIRATION RATE: 18 BRPM | BODY MASS INDEX: 22.19 KG/M2 | OXYGEN SATURATION: 99 % | WEIGHT: 155 LBS | HEART RATE: 76 BPM

## 2025-04-01 DIAGNOSIS — L02.811 ABSCESS OF SCALP: Primary | ICD-10-CM

## 2025-04-01 RX ORDER — MUPIROCIN 20 MG/G
OINTMENT TOPICAL 3 TIMES DAILY
Qty: 30 G | Refills: 0 | Status: SHIPPED | OUTPATIENT
Start: 2025-04-01

## 2025-04-01 RX ORDER — DOXYCYCLINE HYCLATE 100 MG
100 TABLET ORAL 2 TIMES DAILY
Qty: 20 TABLET | Refills: 0 | Status: SHIPPED | OUTPATIENT
Start: 2025-04-01 | End: 2025-04-11

## 2025-04-01 NOTE — PROGRESS NOTES
"Subjective:      Patient ID: Adore Estes is a 34 y.o. female.    Vitals:  height is 5' 10" (1.778 m) and weight is 70.3 kg (155 lb). Her oral temperature is 98.2 °F (36.8 °C). Her blood pressure is 130/85 and her pulse is 76. Her respiration is 18 and oxygen saturation is 99%.     Chief Complaint: Abscess (Abscess on scalp x1 week.)    Abscess  Chronicity:  Recurrent  Abscess location: head/scalp.  Associated Symptoms: no fever, no chills  Characteristics: draining, painful and swelling    Pain Scale:  5/10  Treatments Tried:  NSAIDs  Relieved by:  NSAIDs  Worsened by:  Nothing      Constitution: Negative for chills, fatigue and fever.   Skin:  Positive for erythema and abscess (scalp).      Objective:     Physical Exam   Constitutional: She is oriented to person, place, and time. She appears well-developed.   HENT:   Head: Normocephalic and atraumatic. Head is without abrasion, without contusion and without laceration.   Mouth/Throat: Oropharynx is clear and moist and mucous membranes are normal.   Eyes: EOM and lids are normal.   Neck: Trachea normal and phonation normal. Neck supple.   Cardiovascular: Normal rate.   Pulmonary/Chest: Effort normal. No respiratory distress.   Musculoskeletal: Normal range of motion.         General: Normal range of motion.   Neurological: She is alert and oriented to person, place, and time. She displays no weakness.   Skin: Skin is warm, dry, intact, no rash and abscessed (scalp). Capillary refill takes less than 2 seconds. erythema No abrasion, No burn, No bruising and No ecchymosis        Psychiatric: Her speech is normal and behavior is normal. Mood, judgment and thought content normal.   Nursing note and vitals reviewed.      Assessment:     1. Abscess of scalp        Plan:       Abscess of scalp  -     doxycycline (VIBRA-TABS) 100 MG tablet; Take 1 tablet (100 mg total) by mouth 2 (two) times daily. for 10 days  Dispense: 20 tablet; Refill: 0  -     mupirocin (BACTROBAN) " 2 % ointment; Apply topically 3 (three) times daily.  Dispense: 30 g; Refill: 0  -     Ambulatory referral/consult to Wound Clinic      Discussed medication with patient who acknowledges understanding and is agreeable to POC. Follow up with primary care. Increase fluid intake. Red flags for ER discussed.    Referred to wound care for further evaluation.

## 2025-04-01 NOTE — PATIENT INSTRUCTIONS
Doxycycline twice a day for 10 days.  Always take with food and a full glass of water and do not lay down for 1 hour after taking each dose.  Be sure to protect her skin against the sun as doxycycline is well known to cause excessive sun skin sensitivity that could result in severe sunburn, rash, blistering    Follow up with wound care.

## 2025-04-03 ENCOUNTER — HOSPITAL ENCOUNTER (EMERGENCY)
Facility: HOSPITAL | Age: 35
Discharge: HOME | End: 2025-04-03
Attending: EMERGENCY MEDICINE
Payer: MEDICAID

## 2025-04-03 VITALS
DIASTOLIC BLOOD PRESSURE: 72 MMHG | RESPIRATION RATE: 18 BRPM | HEIGHT: 70 IN | SYSTOLIC BLOOD PRESSURE: 116 MMHG | TEMPERATURE: 98 F | HEART RATE: 85 BPM | BODY MASS INDEX: 22.19 KG/M2 | OXYGEN SATURATION: 99 % | WEIGHT: 155 LBS

## 2025-04-03 DIAGNOSIS — B35.0 KERION: Primary | ICD-10-CM

## 2025-04-03 LAB
HCV AB SERPL QL IA: NORMAL
HIV 1+2 AB+HIV1 P24 AG SERPL QL IA: NORMAL

## 2025-04-03 PROCEDURE — 63600175 PHARM REV CODE 636 W HCPCS: Performed by: EMERGENCY MEDICINE

## 2025-04-03 PROCEDURE — 36415 COLL VENOUS BLD VENIPUNCTURE: CPT | Performed by: EMERGENCY MEDICINE

## 2025-04-03 PROCEDURE — 10060 I&D ABSCESS SIMPLE/SINGLE: CPT

## 2025-04-03 PROCEDURE — 87070 CULTURE OTHR SPECIMN AEROBIC: CPT | Performed by: EMERGENCY MEDICINE

## 2025-04-03 PROCEDURE — 99284 EMERGENCY DEPT VISIT MOD MDM: CPT | Mod: 25

## 2025-04-03 PROCEDURE — 63700000 PHARM REV CODE 250 ALT 637 W/O HCPCS: Performed by: PHYSICIAN ASSISTANT

## 2025-04-03 PROCEDURE — 86803 HEPATITIS C AB TEST: CPT | Performed by: EMERGENCY MEDICINE

## 2025-04-03 PROCEDURE — 87389 HIV-1 AG W/HIV-1&-2 AB AG IA: CPT | Performed by: EMERGENCY MEDICINE

## 2025-04-03 RX ORDER — LIDOCAINE HYDROCHLORIDE 10 MG/ML
10 INJECTION, SOLUTION INFILTRATION; PERINEURAL
Status: COMPLETED | OUTPATIENT
Start: 2025-04-03 | End: 2025-04-03

## 2025-04-03 RX ORDER — FLUCONAZOLE 200 MG/1
200 TABLET ORAL DAILY
Qty: 7 TABLET | Refills: 0 | Status: SHIPPED | OUTPATIENT
Start: 2025-04-03 | End: 2025-04-10

## 2025-04-03 RX ORDER — FLUCONAZOLE 100 MG/1
200 TABLET ORAL
Status: COMPLETED | OUTPATIENT
Start: 2025-04-03 | End: 2025-04-03

## 2025-04-03 RX ADMIN — FLUCONAZOLE 200 MG: 100 TABLET ORAL at 10:04

## 2025-04-03 RX ADMIN — LIDOCAINE HYDROCHLORIDE 10 ML: 10 INJECTION, SOLUTION INFILTRATION; PERINEURAL at 09:04

## 2025-04-03 NOTE — ED NOTES
"34 y.o. female to ED with c.o. abscess to scalp. Patient states swelling and tenderness started approximately 10 days ago. Patient states she has had this happen several times in the past. Patient denies trauma/ injury to the head. Patient has large area of swelling, redness and tenderness to the back of her scalp. Patient c.o. headache under the abscess. Patient states when she was putting her hair in a ponytail this morning one of the lesions "popped" and pus mixed with blood came out. Patient denies fever/chills, denies n/v/d, denies all other medical complaints at this time.  "

## 2025-04-03 NOTE — ED PROVIDER NOTES
Encounter Date: 4/3/2025       History     Chief Complaint   Patient presents with    Wound Check     Sore on scalp, currently on antibiotics; drainage from wound today     HPI patient is a 34-year-old woman who presents emergency department for evaluation of abscess on her scalp.  She has seen at urgent care 2 days ago and started on antibiotics, doxycycline.  She states it is just still present in his starting to drain purulent fluid..  She has a history of diabetes on insulin, seizures.  No associated fever.  Review of patient's allergies indicates:   Allergen Reactions    Cerebyx [fosphenytoin] Itching    Amoxicillin Hives     Past Medical History:   Diagnosis Date    Diabetes mellitus     Diabetes mellitus type I     Diabetes mellitus, type 2     Insulin pump in place     Seizures     last seizure       Past Surgical History:   Procedure Laterality Date    APPENDECTOMY  2017     SECTION      INCISION AND DRAINAGE OF ABSCESS N/A 2023    Procedure: INCISION AND DRAINAGE, ABSCESS;  Surgeon: Ashkan Adamson III, MD;  Location: [facility];  Service: General;  Laterality: N/A;  posterior scalp     Family History   Problem Relation Name Age of Onset    Asthma Sister x2     Ovarian cancer Sister x2     Breast cancer Maternal Aunt      Colon cancer Neg Hx       Social History[1]  Review of Systems   Skin:  Positive for wound.       Physical Exam     Initial Vitals [25 0813]   BP Pulse Resp Temp SpO2   138/79 101 20 97.9 °F (36.6 °C) 99 %      MAP       --         Physical Exam    Nursing note and vitals reviewed.  Constitutional: She appears well-developed and well-nourished. No distress.   HENT:   Head: Normocephalic and atraumatic.   Kerion at the apex of her scalp draining purulent fluid.   Eyes: EOM are normal. Pupils are equal, round, and reactive to light.   Neck: Neck supple.   Pulmonary/Chest: No respiratory distress.   Musculoskeletal:         General: Normal range of motion.       Cervical back: Neck supple.     Neurological: She is alert and oriented to person, place, and time.   Skin: Skin is warm and dry.         ED Course   I & D - Incision and Drainage    Date/Time: 4/3/2025 8:09 AM  Location procedure was performed: [facility] EMERGENCY DEPARTMENT    Performed by: Magnus Thibodeaux MD  Authorized by: Magnus Thibodeaux MD  Consent Done: Yes  Consent: Verbal consent obtained  Type: abscess  Body area: head/neck  Location details: scalp    Anesthesia:  Local Anesthetic: lidocaine 1% without epinephrine    Patient sedated: no  Scalpel size: 11  Incision type: single straight  Incision depth: dermal  Complexity: complex  Drainage: pus  Drainage amount: moderate  Wound treatment: incision, expression of material and deloculation  Complications: No  Patient tolerance: Patient tolerated the procedure well with no immediate complications    Incision depth: dermal        Labs Reviewed   HEPATITIS C ANTIBODY - Normal       Result Value    Hep C Ab Interp Non-Reactive     HIV 1 / 2 ANTIBODY - Normal    HIV 1/2 Ag/Ab Non-Reactive     CULTURE, AEROBIC  (SPECIFY SOURCE)          Imaging Results    None          Medications   LIDOcaine HCL 10 mg/ml (1%) injection 10 mL (10 mLs Infiltration Given by Other 4/3/25 0900)   fluconazole tablet 200 mg (200 mg Oral Given 4/3/25 1045)     Medical Decision Making  This is an emergent evaluation of a patient with complaints of a skin infection.    I decided to obtain and review the old medical record.    Clinically the patient has a Kerion with scalp abscess. The patient's abscess has been incised and drained.  She is to continue taking doxycycline and I will start fluconazole  The patient has no signs of systemic symptoms or significant cellulitis to warrant admission at this time.  The patient has been instructed to follow up with their regular doctor or the emergency department in 2 - 3 days.  've given the patient specific return precautions.    Wound care has  been discussed.      The results and physical exam findings were reviewed with the patient. Pt agrees with assessment, disposition and treatment plan and has no further questions or complaints at this time.    Magnus Thibodeaux M.D. 3:54 PM 4/3/2025            Risk  Prescription drug management.                                      Clinical Impression:  Final diagnoses:  [B35.0] Kerion (Primary)          ED Disposition Condition    Discharge Stable          ED Prescriptions       Medication Sig Dispense Start Date End Date Auth. Provider    fluconazole (DIFLUCAN) 200 MG Tab Take 1 tablet (200 mg total) by mouth once daily. for 7 days 7 tablet 4/3/2025 4/10/2025 Magnus Thibodeaux MD          Follow-up Information       Follow up With Specialties Details Why Contact Info Additional Information    Toña Israel, FNP-C Family Medicine In 1 week  [address] [place] [id number] [phone]       [facility] -  Emergency Medicine  As needed, If symptoms worsen [address] [place] [address] [address] [place] [id number] 1st floor                 [1]   Social History  Tobacco Use    Smoking status: Never    Smokeless tobacco: Never   Substance Use Topics    Alcohol use: Yes     Alcohol/week: 3.0 standard drinks of alcohol     Types: 1 Glasses of wine, 1 Cans of beer, 1 Shots of liquor per week     Comment: 2 drink per week     Drug use: No        Magnus Thibodeaux MD  04/03/25 3757     ABDOMINAL PAIN

## 2025-04-05 LAB — BACTERIA SPEC AEROBE CULT: NORMAL

## 2025-04-09 DIAGNOSIS — E10.65 TYPE 1 DIABETES MELLITUS WITH HYPERGLYCEMIA: ICD-10-CM

## 2025-04-09 RX ORDER — INSULIN GLARGINE 100 [IU]/ML
INJECTION, SOLUTION SUBCUTANEOUS
Qty: 15 ML | Refills: 0 | Status: SHIPPED | OUTPATIENT
Start: 2025-04-09

## 2025-05-21 DIAGNOSIS — E10.65 TYPE 1 DIABETES MELLITUS WITH HYPERGLYCEMIA: ICD-10-CM

## 2025-05-21 RX ORDER — INSULIN GLARGINE 100 [IU]/ML
INJECTION, SOLUTION SUBCUTANEOUS
Qty: 15 ML | Refills: 0 | Status: SHIPPED | OUTPATIENT
Start: 2025-05-21

## 2025-06-24 ENCOUNTER — TELEPHONE (OUTPATIENT)
Dept: ENDOCRINOLOGY | Facility: CLINIC | Age: 35
End: 2025-06-24
Payer: MEDICAID

## 2025-06-25 ENCOUNTER — OFFICE VISIT (OUTPATIENT)
Dept: ENDOCRINOLOGY | Facility: CLINIC | Age: 35
End: 2025-06-25
Payer: MEDICAID

## 2025-06-25 VITALS
SYSTOLIC BLOOD PRESSURE: 114 MMHG | DIASTOLIC BLOOD PRESSURE: 70 MMHG | HEART RATE: 76 BPM | OXYGEN SATURATION: 99 % | BODY MASS INDEX: 22.52 KG/M2 | HEIGHT: 70 IN | WEIGHT: 157.31 LBS

## 2025-06-25 DIAGNOSIS — E10.65 TYPE 1 DIABETES MELLITUS WITH HYPERGLYCEMIA: Primary | ICD-10-CM

## 2025-06-25 PROCEDURE — G2211 COMPLEX E/M VISIT ADD ON: HCPCS | Mod: S$GLB,,, | Performed by: NURSE PRACTITIONER

## 2025-06-25 PROCEDURE — 3078F DIAST BP <80 MM HG: CPT | Mod: CPTII,S$GLB,, | Performed by: NURSE PRACTITIONER

## 2025-06-25 PROCEDURE — 3074F SYST BP LT 130 MM HG: CPT | Mod: CPTII,S$GLB,, | Performed by: NURSE PRACTITIONER

## 2025-06-25 PROCEDURE — 99213 OFFICE O/P EST LOW 20 MIN: CPT | Mod: S$GLB,,, | Performed by: NURSE PRACTITIONER

## 2025-06-25 PROCEDURE — 1159F MED LIST DOCD IN RCRD: CPT | Mod: CPTII,S$GLB,, | Performed by: NURSE PRACTITIONER

## 2025-06-25 PROCEDURE — 3008F BODY MASS INDEX DOCD: CPT | Mod: CPTII,S$GLB,, | Performed by: NURSE PRACTITIONER

## 2025-06-25 RX ORDER — INSULIN ASPART 100 [IU]/ML
INJECTION, SOLUTION INTRAVENOUS; SUBCUTANEOUS
Qty: 30 ML | Refills: 11 | Status: SHIPPED | OUTPATIENT
Start: 2025-06-25

## 2025-06-25 RX ORDER — BLOOD-GLUCOSE SENSOR
EACH MISCELLANEOUS
Qty: 9 EACH | Refills: 4 | Status: SHIPPED | OUTPATIENT
Start: 2025-06-25

## 2025-06-25 RX ORDER — MINOCYCLINE HYDROCHLORIDE 100 MG/1
CAPSULE ORAL
COMMUNITY
Start: 2025-06-25

## 2025-06-25 RX ORDER — INSULIN GLARGINE 100 [IU]/ML
INJECTION, SOLUTION SUBCUTANEOUS
Qty: 15 ML | Refills: 11 | Status: SHIPPED | OUTPATIENT
Start: 2025-06-25

## 2025-06-25 NOTE — PROGRESS NOTES
CC: This 34 y.o. female presents for management of diabetes  and chronic conditions pending review including h/o pancreatitis     HPI: She was diagnosed with T2DM in 2/6/1996 after experiencing polyuria and polydipsia. Admitted on 12/17 for DKA and switched from Medtronic 530 G to MDI.  Has been hospitalized for DKA multiple times. Does report seizures in the past after having hypoglycemia. She had DKA in 2021 and  11/22.  Family hx of DM: mom's 1/2 sister   Fhx of thyroid disease: parents     Last seen March 2024     hypoglycemia at home -none   monitoring BG at home:  Fasting  - mid 200s  Dinner- upper 200-300s     Diet: Eats 2  Meals a day, snacks- dried fruit, nuts   Exercise: dance class twice a week for 3 hrs   CURRENT DM MEDS: Lantus 25 u bid,  Novolog w carb ratio 4, ISF 25,   Target 175   Timing prandial insulin 5-15 minutes before meals: yes  Vial/pen:  Uses pens  Glucometer type:  True metrix     Standards of Care:  Eye exam: 2024  [employer]     Works as an ,, dancer      ROS:   Gen: Appetite good   Eyes: Denies visual disturbances  Resp: no SOB or CHAN   Cardiac: No palpitations, chest pain    GI: No nausea or vomiting, diarrhea, constipation  /GYN: 0-1+ nocturia, burning or pain.   MS/Neuro: Denies numbness/ tingling in BLE; Gait steady, speech clear  Psych: Denies drug/ETOH abuse,+ hx of post partum depression.  Other systems: negative.    PE:  GENERAL: Well developed, well nourished.  PSYCH: AAOx3, appropriate mood and affect, pleasant expression, conversant, appears relaxed, well groomed.   EYES: Conjunctiva, corneas clear  NECK: Supple, trachea midline,   NEURO: Gait steady  SKIN:   no acanthosis nigracans.  FOOT EXAMINATION: 6/25/2025   No foot deformity, corns or callus formation,   nails in good condition and well trimmed, no interspace maceration or ulceration noted.  Decreased hair growth present over toes/feet.   Protective sensation intact with 10 gram  "monofilament.  +2 dorsalis pedis and posterior pulses noted.     Personally reviewed Past Medical, Surgical, Social History.    /70 (BP Location: Left arm, Patient Position: Sitting)   Pulse 76   Ht 5' 10" (1.778 m)   Wt 71.3 kg (157 lb 4.8 oz)   SpO2 99%   BMI 22.57 kg/m²      Personally reviewed the below labs:      Chemistry        Component Value Date/Time     (L) 12/07/2024 1142     (L) 09/27/2024 0100    K 4.4 12/07/2024 1142    K 3.8 09/27/2024 0100     09/27/2024 0100    CO2 21 12/07/2024 1142    CO2 21 (L) 09/27/2024 0100    BUN 12.0 12/07/2024 1142    BUN 9 09/27/2024 0100    CREATININE 0.44 (L) 12/07/2024 1142    CREATININE 0.5 09/27/2024 0100    CREATININE 0.7 08/19/2013 0940     (H) 09/27/2024 0100        Component Value Date/Time    CALCIUM 8.5 12/07/2024 1142    CALCIUM 8.9 09/27/2024 0100    CALCIUM 9.1 08/19/2013 0940    ALKPHOS 70 09/27/2024 0100    ALKPHOS 64 08/19/2013 0940    AST 15 12/07/2024 1142    AST 9 (L) 09/27/2024 0100    AST 13 08/19/2013 0940    ALT 19 12/07/2024 1142    ALT 8 (L) 09/27/2024 0100    BILITOT 0.4 12/07/2024 1142    BILITOT 0.5 09/27/2024 0100    ESTGFRAFRICA >60 12/18/2021 0316    EGFRNONAA >60 12/18/2021 0316            Lab Results   Component Value Date    TSH 0.699 11/18/2022       Recent Labs   Lab 11/19/22  0434   LDL Cholesterol 123.2   HDL 47   Cholesterol 203 H        Results for orders placed or performed in visit on 11/30/21   Vitamin D    Collection Time: 11/30/21  7:56 AM   Result Value Ref Range    Vit D, 25-Hydroxy 28 (L) 30 - 96 ng/mL     No results found for this or any previous visit.    No results found for: "MICALBCREAT"    Hemoglobin A1C   Date Value Ref Range Status   03/04/2024 13.2 (H) 4.0 - 5.6 % Final     Comment:     ADA Screening Guidelines:  5.7-6.4%  Consistent with prediabetes  >or=6.5%  Consistent with diabetes    High levels of fetal hemoglobin interfere with the HbA1C  assay. Heterozygous hemoglobin " variants (HbS, HgC, etc)do  not significantly interfere with this assay.   However, presence of multiple variants may affect accuracy.     07/31/2023 13.5 (H) 4.0 - 5.6 % Final     Comment:     ADA Screening Guidelines:  5.7-6.4%  Consistent with prediabetes  >or=6.5%  Consistent with diabetes    High levels of fetal hemoglobin interfere with the HbA1C  assay. Heterozygous hemoglobin variants (HbS, HgC, etc)do  not significantly interfere with this assay.   However, presence of multiple variants may affect accuracy.     11/18/2022 13.3 (H) 4.0 - 5.6 % Final     Comment:     ADA Screening Guidelines:  5.7-6.4%  Consistent with prediabetes  >or=6.5%  Consistent with diabetes    High levels of fetal hemoglobin interfere with the HbA1C  assay. Heterozygous hemoglobin variants (HbS, HgC, etc)do  not significantly interfere with this assay.   However, presence of multiple variants may affect accuracy.          ASSESSMENT and PLAN:      1. T1DM with hyperglycemia-    Fasting labs Monday  Continue Lantus 25 u bid- Continue Novolog carb ratio 4, ISF 25 - Change Target to 140   Downloaded Bolus Calc valdez in clinic and programmed w above information  RX for Continuous Glucose Monitor   -Recommend Dexcom  Continuous Glucose monitor                          Pt would benefit from therapeutic continuous glucose monitor to be able                         to make frequent insulin adjustments, based on current glucoses.      2. H/o pancreatitis     Follow-up: in 3 month w A1C

## 2025-08-20 DIAGNOSIS — E10.65 TYPE 1 DIABETES MELLITUS WITH HYPERGLYCEMIA: ICD-10-CM

## 2025-08-20 RX ORDER — INSULIN GLARGINE 100 [IU]/ML
INJECTION, SOLUTION SUBCUTANEOUS
Qty: 15 ML | Refills: 11 | Status: SHIPPED | OUTPATIENT
Start: 2025-08-20

## (undated) DEVICE — GAUZE PACKING STRIP PLN 1/2X5

## (undated) DEVICE — SOL NACL IRR 1000ML BTL

## (undated) DEVICE — TOWEL OR NONABSORB ADH 17X26

## (undated) DEVICE — DRAPE MINOR FEN 98X77X121IN

## (undated) DEVICE — COVER PROXIMA MAYO STAND

## (undated) DEVICE — GOWN POLY REINF BRTH SLV XL

## (undated) DEVICE — SOL POVIDONE PREP IODINE 4 OZ

## (undated) DEVICE — GLOVE SENSICARE PI ALOE 7.5

## (undated) DEVICE — SLEEVE SCD EXPRESS KNEE MEDIUM

## (undated) DEVICE — SOL POVIDONE SCRUB IODINE 4 OZ

## (undated) DEVICE — SWAB CULTURETTE SINGLE

## (undated) DEVICE — LINER SUCTION 3000CC

## (undated) DEVICE — STRAP OR TABLE 5IN X 72IN

## (undated) DEVICE — PACK BASIC

## (undated) DEVICE — HEMOSTAT SURGICEL 4X8IN

## (undated) DEVICE — DRAPE T THYROID STERILE

## (undated) DEVICE — SPONGE BULKEE II ABSRB 6X6.75

## (undated) DEVICE — GLOVE SENSICARE PI ALOE 6.5

## (undated) DEVICE — BOWL STERILE LARGE 32OZ

## (undated) DEVICE — YANKAUER OPEN TIP W/O VENT

## (undated) DEVICE — GLOVE SENSICARE PI GRN 7

## (undated) DEVICE — TOWEL OR DISP STRL BLUE 4/PK

## (undated) DEVICE — TRAY SKIN SCRUB DRY PREMIUM

## (undated) DEVICE — PACK SET UP 190 OMC-NS

## (undated) DEVICE — ELECTRODE REM PLYHSV RETURN 9